# Patient Record
Sex: FEMALE | Race: WHITE | NOT HISPANIC OR LATINO | Employment: OTHER | URBAN - METROPOLITAN AREA
[De-identification: names, ages, dates, MRNs, and addresses within clinical notes are randomized per-mention and may not be internally consistent; named-entity substitution may affect disease eponyms.]

---

## 2017-01-17 ENCOUNTER — OFFICE VISIT (OUTPATIENT)
Dept: FAMILY MEDICINE | Facility: CLINIC | Age: 37
End: 2017-01-17
Payer: COMMERCIAL

## 2017-01-17 VITALS
TEMPERATURE: 99 F | WEIGHT: 193 LBS | BODY MASS INDEX: 31.02 KG/M2 | SYSTOLIC BLOOD PRESSURE: 138 MMHG | HEIGHT: 66 IN | HEART RATE: 80 BPM | DIASTOLIC BLOOD PRESSURE: 82 MMHG

## 2017-01-17 DIAGNOSIS — R31.9 HEMATURIA: Primary | ICD-10-CM

## 2017-01-17 DIAGNOSIS — Z11.3 SCREEN FOR STD (SEXUALLY TRANSMITTED DISEASE): ICD-10-CM

## 2017-01-17 DIAGNOSIS — Z97.5 IUD (INTRAUTERINE DEVICE) IN PLACE: ICD-10-CM

## 2017-01-17 DIAGNOSIS — L70.0 ACNE VULGARIS: ICD-10-CM

## 2017-01-17 DIAGNOSIS — R10.2 PELVIC CRAMPING: ICD-10-CM

## 2017-01-17 LAB
ALBUMIN UR-MCNC: NEGATIVE MG/DL
APPEARANCE UR: CLEAR
BACTERIA #/AREA URNS HPF: ABNORMAL /HPF
BILIRUB UR QL STRIP: NEGATIVE
COLOR UR AUTO: YELLOW
GLUCOSE UR STRIP-MCNC: NEGATIVE MG/DL
HGB UR QL STRIP: ABNORMAL
KETONES UR STRIP-MCNC: NEGATIVE MG/DL
LEUKOCYTE ESTERASE UR QL STRIP: NEGATIVE
MICRO REPORT STATUS: NORMAL
NITRATE UR QL: NEGATIVE
PH UR STRIP: 6.5 PH (ref 5–7)
RBC #/AREA URNS AUTO: ABNORMAL /HPF (ref 0–2)
SP GR UR STRIP: 1.02 (ref 1–1.03)
SPECIMEN SOURCE: NORMAL
URN SPEC COLLECT METH UR: ABNORMAL
UROBILINOGEN UR STRIP-ACNC: 0.2 EU/DL (ref 0.2–1)
WBC #/AREA URNS AUTO: ABNORMAL /HPF (ref 0–2)
WET PREP SPEC: NORMAL

## 2017-01-17 PROCEDURE — 87210 SMEAR WET MOUNT SALINE/INK: CPT | Performed by: FAMILY MEDICINE

## 2017-01-17 PROCEDURE — 99214 OFFICE O/P EST MOD 30 MIN: CPT | Performed by: FAMILY MEDICINE

## 2017-01-17 PROCEDURE — 81001 URINALYSIS AUTO W/SCOPE: CPT | Performed by: FAMILY MEDICINE

## 2017-01-17 PROCEDURE — 87591 N.GONORRHOEAE DNA AMP PROB: CPT | Performed by: FAMILY MEDICINE

## 2017-01-17 PROCEDURE — 87491 CHLMYD TRACH DNA AMP PROBE: CPT | Performed by: FAMILY MEDICINE

## 2017-01-17 NOTE — Clinical Note
[facility]  [address] [address] [phone]      January 23, 2017      Bernie Croft  [address]   [facility] [address]          Dear Ms. Croft    The results of your recent lab tests were within normal limits. Enclosed is a copy of these results.  If you have any further questions or problems, please contact our office.    Sincerely,      Madhav Samson DO/hl    Results for orders placed or performed in visit on 01/17/17   UA reflex to Microscopic and Culture   Result Value Ref Range    Color Urine Yellow     Appearance Urine Clear     Glucose Urine Negative NEG mg/dL    Bilirubin Urine Negative NEG    Ketones Urine Negative NEG mg/dL    Specific Gravity Urine 1.020 1.003 - 1.035    Blood Urine Trace (A) NEG    pH Urine 6.5 5.0 - 7.0 pH    Protein Albumin Urine Negative NEG mg/dL    Urobilinogen Urine 0.2 0.2 - 1.0 EU/dL    Nitrite Urine Negative NEG    Leukocyte Esterase Urine Negative NEG    Source Midstream Urine    Urine Microscopic   Result Value Ref Range    WBC Urine O - 2 0 - 2 /HPF    RBC Urine O - 2 0 - 2 /HPF    Bacteria Urine Few (A) NEG /HPF   Wet prep   Result Value Ref Range    Specimen Description Vagina     Wet Prep       No clue cells seen  No yeast seen  No Trichomonas seen      Micro Report Status FINAL 01/17/2017    Neisseria gonorrhoeae PCR   Result Value Ref Range    Specimen Descrip Cervix     N Gonorrhea PCR  NEG     Negative   Negative for N. gonorrhoeae rRNA by transcription mediated amplification.   A negative result by transcription mediated amplification does not preclude the   presence of N. gonorrhoeae infection because results are dependent on proper   and adequate collection, absence of inhibitors, and sufficient rRNA to be   detected.     Chlamydia trachomatis PCR   Result Value Ref Range    Specimen Description Cervix     Chlamydia Trachomatis PCR  NEG     Negative   Negative for C. trachomatis rRNA by  transcription mediated amplification.   A negative result by transcription mediated amplification does not preclude the   presence of C. trachomatis infection because results are dependent on proper   and adequate collection, absence of inhibitors, and sufficient rRNA to be   detected.

## 2017-01-17 NOTE — MR AVS SNAPSHOT
After Visit Summary   1/17/2017    Bernie Croft    MRN: [id number]           Patient Information     Date Of Birth          1980        Visit Information        Provider Department      1/17/2017 1:40 PM Madhav Samson DO [facility]        Today's Diagnoses     Hematuria    -  1     Pelvic cramping         Acne vulgaris         Screen for STD (sexually transmitted disease)         IUD (intrauterine device) in place            Follow-ups after your visit        Additional Services     DERMATOLOGY REFERRAL       Your provider has referred you to: [facility]: Dermatology [facility] [phone]   [email or web address]  UMP: [facility] [phone]   [email or web address]  N: [facility] [phone]   [email or web address]    Please be aware that coverage of these services is subject to the terms and limitations of your health insurance plan.  Call member services at your health plan with any benefit or coverage questions.      Please bring the following with you to your appointment:    (1) Any X-Rays, CTs or MRIs which have been performed.  Contact the facility where they were done to arrange for  prior to your scheduled appointment.    (2) List of current medications  (3) This referral request   (4) Any documents/labs given to you for this referral                  Who to contact     If you have questions or need follow up information about today's clinic visit or your schedule please contact [facility] directly at [phone].  Normal or non-critical lab and imaging results will be communicated to you by MyChart, letter or phone within 4 business days after the clinic has received the results. If you do not hear from us within 7 days, please contact the clinic through "[employer]t or  "phone. If you have a critical or abnormal lab result, we will notify you by phone as soon as possible.  Submit refill requests through [employer] or call your pharmacy and they will forward the refill request to us. Please allow 3 business days for your refill to be completed.          Additional Information About Your Visit        [employer] Information     [employer] gives you secure access to your electronic health record. If you see a primary care provider, you can also send messages to your care team and make appointments. If you have questions, please call your primary care clinic.  If you do not have a primary care provider, please call [phone] and they will assist you.        Care EveryWhere ID     This is your Care EveryWhere ID. This could be used by other organizations to access your [place] medical records  [id number]        Your Vitals Were     Pulse Temperature Height BMI (Body Mass Index)          80 99  F (37.2  C) (Oral) 5' 5.5\" (1.664 m) 31.62 kg/m2         Blood Pressure from Last 3 Encounters:   01/17/17 138/82   05/27/16 124/82   06/23/15 112/60    Weight from Last 3 Encounters:   01/17/17 193 lb (87.544 kg)   05/27/16 176 lb 8 oz (80.06 kg)   05/17/16 178 lb 9.6 oz (81.012 kg)              We Performed the Following     Chlamydia trachomatis PCR     DERMATOLOGY REFERRAL     Neisseria gonorrhoeae PCR     UA reflex to Microscopic and Culture     Urine Microscopic     Wet prep        Primary Care Provider Office Phone # Fax #    Madhav Bossshivambraeden Chapin,  [phone]       [address] [address]        Thank you!     Thank you for choosing [facility]  for your care. Our goal is always to provide you with excellent care. Hearing back from our patients is one way we can continue to improve our services. Please take a few minutes to complete the written survey that you may receive in the mail after your visit with us. " Thank you!             Your Updated Medication List - Protect others around you: Learn how to safely use, store and throw away your medicines at www.disposemymeds.org.          This list is accurate as of: 1/17/17  2:42 PM.  Always use your most recent med list.                   Brand Name Dispense Instructions for use    B-12 1000 MCG Caps      Once daily       minocycline 100 MG tablet    DYNACIN    90 tablet    Take 1 tablet (100 mg) by mouth daily Ok to switch to capsules if cheaper       MIRENA (52 MG) 20 MCG/24HR IUD   Generic drug:  levonorgestrel      IUD       multivitamin  peds with iron 60 MG chewable tablet      Take 1 chew tab by mouth daily.       vitamin D 2000 UNITS Caps      1 capsules daily

## 2017-01-17 NOTE — PROGRESS NOTES
SUBJECTIVE:                                                    Bernie Croft is a 36 year old female who presents to clinic today for the following health issues:    She is feeling off balance since her second round of medication [facility]. Not like herself.   She is still dealing with acne and feels her minocycline is not working. She knows that long term use of this can cause blood in urine, she is worries.       URINARY TRACT SYMPTOMS     Onset: treated with meds at [facility], stopped meds when they told her to, two days early. She was fine over [place] and about 2 weeks ago symptoms came back again. She does tend to hold her urine too long at times and can feel when she is getting bladder issues. She was treated a second time at [facility] and finished entire round of antibiotics, felt like symptoms mostly resolved but still has some lingering issues.     Description:   Painful urination (Dysuria): YES  Blood in urine (Hematuria): YES  Delay in urine (Hesitency):no    Intensity: moderate    Progression of Symptoms:  worsening    Accompanying Signs & Symptoms:  Fever/chills: no  Flank pain YES  Nausea and vomiting: no   Any vaginal symptoms: a bit of an odor  Abdominal/Pelvic Pain: YES, pressure   History:   History of frequent UTI's: YES  History of kidney stones: no   Sexually Active: yes  Possibility of pregnancy: No, IUD in place    Precipitating factors:   Holding urine too long         Therapies Tried and outcome: Increase fluid intake            Problem list and histories reviewed & adjusted, as indicated.  Additional history: as documented    Patient Active Problem List   Diagnosis     Other abnormal Papanicolaou smear of cervix and cervical HPV(795.09)     Acne     Hyperhidrosis of axilla     Obesity     CARDIOVASCULAR SCREENING; LDL GOAL LESS THAN 130     Melanocytic nevus     S/P Adjustable Band baraitric surgery      IUD (intrauterine device) in place     Abnormal mammogram     BMI  "28.0-28.9,adult     Postsurgical nonabsorption     Past Surgical History   Procedure Laterality Date     Mammoplasty reduction bilateral       Gyn surgery            Ent surgery       surgery for displaced nose     Laparoscopic gastric adjustable banding  2012     Procedure: LAPAROSCOPIC GASTRIC ADJUSTABLE BANDING;  LAPAROSCOPIC GASTRIC ADJUSTABLE BANDING ;  Surgeon: Sina Day MD;  Location:  OR       Social History   Substance Use Topics     Smoking status: Never Smoker      Smokeless tobacco: Never Used      Comment: no second hand smoke     Alcohol Use: Yes      Comment: rare about 0-1 drink per month     Family History   Problem Relation Age of Onset     DIABETES Maternal Grandmother      DIABETES Maternal Grandfather      C.A.D. No family hx of      Hypertension Maternal Grandmother      Hypertension Maternal Grandfather      Breast Cancer Maternal Grandmother      late 60's     Cancer - colorectal No family hx of      CEREBROVASCULAR DISEASE No family hx of      Thyroid Disease Father      Thyroid Disease Sister      Thyroid Disease Paternal Grandmother      Asthma No family hx of            ROS:  Constitutional, HEENT, cardiovascular, pulmonary, GI, , musculoskeletal, neuro, skin, endocrine and psych systems are negative, except as otherwise noted.    OBJECTIVE:                                                    /82 mmHg  Pulse 80  Temp(Src) 99  F (37.2  C) (Oral)  Ht 5' 5.5\" (1.664 m)  Wt 193 lb (87.544 kg)  BMI 31.62 kg/m2  Body mass index is 31.62 kg/(m^2).  GENERAL: healthy, alert and no distress  EYES: Eyes grossly normal to inspection, PERRL and conjunctivae and sclerae normal  HENT: ear canals and TM's normal, nose and mouth without ulcers or lesions  NECK: no adenopathy, no asymmetry, masses, or scars and thyroid normal to palpation  RESP: lungs clear to auscultation - no rales, rhonchi or wheezes  BREAST: normal without masses, tenderness or nipple discharge and no " palpable axillary masses or adenopathy  CV: regular rate and rhythm, normal S1 S2, no S3 or S4, no murmur, click or rub, no peripheral edema and peripheral pulses strong  ABDOMEN: soft, nontender, no hepatosplenomegaly, no masses and bowel sounds normal   (female): normal female external genitalia, normal urethral meatus, vaginal mucosa pink, moist, well rugated, and normal cervix/adnexa/uterus without masses or discharge, iud string is in place  MS: no gross musculoskeletal defects noted, no edema  SKIN: no suspicious lesions or rashes  NEURO: Normal strength and tone, mentation intact and speech normal  PSYCH: mentation appears normal, affect normal/bright    Diagnostic Test Results:  Results for orders placed or performed in visit on 01/17/17 (from the past 24 hour(s))   UA reflex to Microscopic and Culture   Result Value Ref Range    Color Urine Yellow     Appearance Urine Clear     Glucose Urine Negative NEG mg/dL    Bilirubin Urine Negative NEG    Ketones Urine Negative NEG mg/dL    Specific Gravity Urine 1.020 1.003 - 1.035    Blood Urine Trace (A) NEG    pH Urine 6.5 5.0 - 7.0 pH    Protein Albumin Urine Negative NEG mg/dL    Urobilinogen Urine 0.2 0.2 - 1.0 EU/dL    Nitrite Urine Negative NEG    Leukocyte Esterase Urine Negative NEG    Source Midstream Urine    Urine Microscopic   Result Value Ref Range    WBC Urine O - 2 0 - 2 /HPF    RBC Urine O - 2 0 - 2 /HPF    Bacteria Urine Few (A) NEG /HPF   Wet prep   Result Value Ref Range    Specimen Description Vagina     Wet Prep       No clue cells seen  No yeast seen  No Trichomonas seen      Micro Report Status FINAL 01/17/2017         ASSESSMENT/PLAN:                                                        ICD-10-CM    1. Hematuria R31.9 UA reflex to Microscopic and Culture     Urine Microscopic   2. Pelvic cramping R10.2 Wet prep     Neisseria gonorrhoeae PCR     Chlamydia trachomatis PCR     CANCELED: Neisseria gonorrhoeae PCR     CANCELED: Chlamydia  trachomatis PCR   3. Acne vulgaris L70.0 DERMATOLOGY REFERRAL   4. Screen for STD (sexually transmitted disease) Z11.3 Chlamydia trachomatis PCR   5. IUD (intrauterine device) in place Z97.5      History of hematuria with recent UTI-today negative, discussed about bladder hygeine  Pelvic cramping/vaginal discharge-wet prep negative  iud-in place  Acne-not improving, referral placed          See Patient Instructions    Madhav Samson DO  [facility]

## 2017-01-18 ENCOUNTER — MYC MEDICAL ADVICE (OUTPATIENT)
Dept: FAMILY MEDICINE | Facility: CLINIC | Age: 37
End: 2017-01-18

## 2017-01-18 LAB
C TRACH DNA SPEC QL NAA+PROBE: NORMAL
N GONORRHOEA DNA SPEC QL NAA+PROBE: NORMAL
SPECIMEN SOURCE: NORMAL
SPECIMEN SOURCE: NORMAL

## 2017-02-12 ENCOUNTER — TELEPHONE (OUTPATIENT)
Dept: NURSING | Facility: CLINIC | Age: 37
End: 2017-02-12

## 2017-02-12 NOTE — TELEPHONE ENCOUNTER
Call Type: Triage Call    Presenting Problem: Patient calling with questions about influenza  exposure, flu shots, etc.  Triage Note:  Guideline Title: Information Only Call; No Symptom Triage (Adult)  Recommended Disposition: Call Provider When Office is Open  Original Inclination: Wanted to speak with a nurse  Override Disposition:  Intended Action: See /Aries Appt  Physician Contacted: No  Requesting information and provider is best resource; no triage required. ?  YES  Requesting regular office appointment ? NO  Sign(s) or symptom(s) associated with a diagnosed condition or with a new illness  ? NO  Requesting information about provider, services or community resources ? NO  Call back to complete assessment/clarification of information from prior caller to  complete triage ? NO  Physician Instructions:  Care Advice:

## 2017-04-06 ENCOUNTER — TRANSFERRED RECORDS (OUTPATIENT)
Dept: HEALTH INFORMATION MANAGEMENT | Facility: CLINIC | Age: 37
End: 2017-04-06

## 2017-04-18 ENCOUNTER — OFFICE VISIT (OUTPATIENT)
Dept: FAMILY MEDICINE | Facility: CLINIC | Age: 37
End: 2017-04-18
Payer: COMMERCIAL

## 2017-04-18 VITALS
SYSTOLIC BLOOD PRESSURE: 142 MMHG | BODY MASS INDEX: 30.97 KG/M2 | TEMPERATURE: 98 F | WEIGHT: 189 LBS | DIASTOLIC BLOOD PRESSURE: 80 MMHG | HEART RATE: 74 BPM

## 2017-04-18 DIAGNOSIS — R03.0 ELEVATED BLOOD PRESSURE READING WITHOUT DIAGNOSIS OF HYPERTENSION: Primary | ICD-10-CM

## 2017-04-18 DIAGNOSIS — Z13.220 LIPID SCREENING: ICD-10-CM

## 2017-04-18 DIAGNOSIS — L70.9 ACNE, UNSPECIFIED ACNE TYPE: ICD-10-CM

## 2017-04-18 DIAGNOSIS — S46.312A TRICEPS STRAIN, LEFT, INITIAL ENCOUNTER: ICD-10-CM

## 2017-04-18 PROCEDURE — 36415 COLL VENOUS BLD VENIPUNCTURE: CPT | Performed by: PHYSICIAN ASSISTANT

## 2017-04-18 PROCEDURE — 99214 OFFICE O/P EST MOD 30 MIN: CPT | Performed by: PHYSICIAN ASSISTANT

## 2017-04-18 PROCEDURE — 80061 LIPID PANEL: CPT | Performed by: PHYSICIAN ASSISTANT

## 2017-04-18 PROCEDURE — 80048 BASIC METABOLIC PNL TOTAL CA: CPT | Performed by: PHYSICIAN ASSISTANT

## 2017-04-18 RX ORDER — SPIRONOLACTONE 25 MG/1
125 TABLET ORAL DAILY
COMMUNITY
Start: 2017-04-18 | End: 2018-07-06

## 2017-04-18 NOTE — PATIENT INSTRUCTIONS
[facility]   Discharged by : Viry CONNER Certified Medical Assistant (AAMA)April 18, 2017 10:31 AM    Paper scripts provided to patient : none   If you have any questions regarding to your visit please contact your care team:   [facility] Clinic Hours Telephone Number   Dr. Viry Alford, HELEN   7am-7pm Monday - Thursday   7am-5pm Fridays  [phone]   (Appointment scheduling available 24/7)   RN Line   [phone] option 2     What options do I have for visits at the clinic other than the traditional office visit?   To expand how we care for you, many of our providers are utilizing electronic visits (e-visits) and telephone visits, when medically appropriate, for interactions with their patients rather than a visit in the clinic. We also offer nurse visits for many medical concerns. Just like any other service, we will bill your insurance company for this type of visit based on time spent on the phone with your provider. Not all insurance companies cover these visits. Please check with your medical insurance if this type of visit is covered. You will be responsible for any charges that are not paid by your insurance.   E-visits via [employer]: generally incur a $35.00 fee.   Telephone visits:   Time spent on the phone: *charged based on time that is spent on the phone in increments of 10 minutes. Estimated cost:   5-10 mins $30.00   11-20 mins. $59.00   21-30 mins. $85.00   Use [employer] (secure email communication and access to your chart) to send your primary care provider a message or make an appointment. Ask someone on your Team how to sign up for [employer].   For a Price Quote for your services, please call our Consumer Price Line at [phone].   As always, Thank you for trusting us with your health care needs!                    [facility]:    Scheduling Appointments  [facility]  Call:  [phone]    [facility]  Call: [phone]    [facility]  Call: [phone]    WHERE TO GO FOR CARE?    Clinic    Make an appointment if you:       Are sick (cold, cough, flu, sore throat, earache or in pain).       Have a small injury (sprain, small cut, burn or broken bone).       Need a physical exam, Pap smear, vaccine or prescription refill.       Have questions about your health or medicines.    To reach us:      Call [phone] ([phone]). Open 24 hours every day. (For counseling services, call [phone].)    Log into ALTHIA at [employer]. (Visit [employer] to create an account.) Hospital emergency room    An emergency is a serious or life- threatening problem that must be treated right away.    Call 485 or get to the hospital if you have:      Very bad or sudden:            - Chest pain or pressure         - Bleeding         - Head or belly pain         - Dizziness or trouble seeing, walking or                          Speaking      Problems breathing      Blood in your vomit or you are coughing up blood      A major injury (knocked out, loss of a finger or limb, rape, broken bone protruding from skin)    A mental health crisis. (Or call the Mental Health Crisis line at 1-628.472.3142 or Suicide Prevention Hotline at 1-914.226.4864.)    Open 24 hours every day. You don't need an appointment.     Urgent care    Visit urgent care for sickness or small injuries when the clinic is closed. You don't need an appointment. To check hours or find an urgent care near you, visit [email or web address]. Online care    Get online care from [employer] for more than 70 common problems, like colds, allergies and infections. Open 24 hours every day at: [email or web address]   Need help deciding?    For advice about where to be seen, you may call your clinic and ask to speak with a nurse. We're here for you 24 hours every day.          If you are deaf or hard of hearing, please let us know. We provide many free services including sign language interpreters, oral interpreters, TTYs, telephone amplifiers, note takers and written materials.

## 2017-04-18 NOTE — PROGRESS NOTES
"  SUBJECTIVE:                                                    Bernie Croft is a 36 year old female who presents to clinic today for the following health issues:      Patient reports that she was seen at [facility] recently for an ear infection. She was told her BP was very high.   She reports  That her BP has \"slowly been creeping\".  Patient has taken lauryn BP medications in the past around 2009. Stopped them in about 2010.   She also reports that she has a very stressful job and drinks quite a bit of coffee  Denies chest pain, shortness of breath, headaches, dizziness.    A couple weeks ago at the gym she injured her left arm doing push ups with legs extended. She reports she has massaged it a lot and it has gotten a bit of a bruise. She describes the pain a shooting sharp pain when putting weight on it. She goes to a boxing gym, throwing a punch is difficult.      Started spironolactone for acne 1 week ago with dermatology.    -------------------------------------    Problem list, Medication list, Allergies, and Medical/Social/Surgical histories reviewed in [facility] and updated as appropriate.    ROS:  A pertinent ROS of the General  HEENT  Cardiovascular  Pulmonary  GI  Musculoskeletal   Neurological  Integumentary systems is otherwise unremarkable.      OBJECTIVE:                                                    /80 (BP Location: Right arm, Patient Position: Chair, Cuff Size: Adult Large)  Pulse 74  Temp 98  F (36.7  C) (Oral)  Ht (P) 5' 5.5\" (1.664 m)  Wt 189 lb (85.7 kg)  BMI (P) 30.97 kg/m2 .  GENERAL:: healthy, alert and no distress  RESP: lungs clear to auscultation - no rales, no rhonchi, no wheezes  CV: regular rates and rhythm, normal S1 S2, no S3 or S4 and no murmur, no click or rub -  Upper arm left: Tender at triceps muscle. Pain with elbow extension. Tightness with shoulder internal rotation and external rotation. Range of motion is otherwise normal of shoulder and elbow and strength " intact.    Diagnostic test results:  Diagnostic Test Results:  No results found for this or any previous visit (from the past 24 hour(s)).     ASSESSMENT/PLAN:                                                        1. Elevated blood pressure reading without diagnosis of hypertension  Discussed dietary changes, improving water intake and increasing cardio.  She has home cuff, will start checking at home. If > 140/90 on a consistent basis, then would recommend treatment. Discussed long term risks of HTN. She understands.  - BASIC METABOLIC PANEL    2. Triceps strain, left, initial encounter  Reviewed findings, she will discuss a rehab program with a  at her gym.    3. Acne, unspecified acne type  Check bmp related to spironolactone.   BMP    4. Lipid screening  - Lipid panel reflex to direct LDL    Follow up with Provider - 1-2 months for recheck with blood pressure.          Jennifer Alford PA-C  [facility]

## 2017-04-18 NOTE — LETTER
[facility]  [address] [address] [phone]      April 26, 2017      Bernie Croft  [address]   [place] [address]              Dear Bernie,    Your labs show a mild elevation in your kidney function. This can occur with fasting. You have not had this in years past, so I would like for it to be repeated in 1 month to make sure it is okay. Please come really hydrated to that visit.    Results for orders placed or performed in visit on 04/18/17   BASIC METABOLIC PANEL   Result Value Ref Range    Sodium 140 133 - 144 mmol/L    Potassium 4.6 3.4 - 5.3 mmol/L    Chloride 106 94 - 109 mmol/L    Carbon Dioxide 26 20 - 32 mmol/L    Anion Gap 8 3 - 14 mmol/L    Glucose 88 70 - 99 mg/dL    Urea Nitrogen 15 7 - 30 mg/dL    Creatinine 1.09 (H) 0.52 - 1.04 mg/dL    GFR Estimate 57 (L) >60 mL/min/1.7m2    GFR Estimate If Black 69 >60 mL/min/1.7m2    Calcium 9.2 8.5 - 10.1 mg/dL   Lipid panel reflex to direct LDL   Result Value Ref Range    Cholesterol 175 <200 mg/dL    Triglycerides 92 <150 mg/dL    HDL Cholesterol 56 >49 mg/dL    LDL Cholesterol Calculated 101 (H) <100 mg/dL    Non HDL Cholesterol 119 <130 mg/dL         Sincerely,    Madhav Samson, DO/roshan

## 2017-04-18 NOTE — MR AVS SNAPSHOT
After Visit Summary   4/18/2017    Bernei Croft    MRN: [id number]           Patient Information     Date Of Birth          1980        Visit Information        Provider Department      4/18/2017 9:40 AM Jennifer Alford PA-C [facility]        Today's Diagnoses     Lipid screening    -  1    Screening for malignant neoplasm of cervix        Elevated blood pressure reading without diagnosis of hypertension        Acne, unspecified acne type          Care Instructions    [facility]   Discharged by : Viyr CONNER, Certified Medical Assistant (AAMA)April 18, 2017 10:31 AM    Paper scripts provided to patient : none   If you have any questions regarding to your visit please contact your care team:   [facility] Clinic Hours Telephone Number   Dr. Viry Alford PA-C   7am-7pm Monday - Thursday   7am-5pm Fridays  [phone]   (Appointment scheduling available 24/7)   RN Line   [phone] option 2     What options do I have for visits at the clinic other than the traditional office visit?   To expand how we care for you, many of our providers are utilizing electronic visits (e-visits) and telephone visits, when medically appropriate, for interactions with their patients rather than a visit in the clinic. We also offer nurse visits for many medical concerns. Just like any other service, we will bill your insurance company for this type of visit based on time spent on the phone with your provider. Not all insurance companies cover these visits. Please check with your medical insurance if this type of visit is covered. You will be responsible for any charges that are not paid by your insurance.   E-visits via HouseTab: generally incur a $35.00 fee.   Telephone visits:   Time spent on the phone: *charged based on time that is spent on the phone in increments of 10 minutes. Estimated cost:   5-10 mins $30.00   11-20  mins. $59.00   21-30 mins. $85.00   Use shoutr (secure email communication and access to your chart) to send your primary care provider a message or make an appointment. Ask someone on your Team how to sign up for shoutr.   For a Price Quote for your services, please call our Consumer Price Line at [phone].   As always, Thank you for trusting us with your health care needs!                    [facility]:    Scheduling Appointments  [place]  Call: [phone]    Zana Klein, [facility]  Call: [phone]    [facility]  Call: [phone]    WHERE TO GO FOR CARE?    Clinic    Make an appointment if you:       Are sick (cold, cough, flu, sore throat, earache or in pain).       Have a small injury (sprain, small cut, burn or broken bone).       Need a physical exam, Pap smear, vaccine or prescription refill.       Have questions about your health or medicines.    To reach us:      Call [phone] ([phone]). Open 24 hours every day. (For counseling services, call [phone].)    Log into shoutr at [email or web address]. (Visit [email or web address] to create an account.) Hospital emergency room    An emergency is a serious or life- threatening problem that must be treated right away.    Call 361 or get to the hospital if you have:      Very bad or sudden:            - Chest pain or pressure         - Bleeding         - Head or belly pain         - Dizziness or trouble seeing, walking or                          Speaking      Problems breathing      Blood in your vomit or you are coughing up blood      A major injury (knocked out, loss of a finger or limb, rape, broken bone protruding from skin)    A mental health crisis. (Or call the Mental Health Crisis line at 1-358.994.5454 or Suicide Prevention Hotline at 1-422.781.5011.)    Open 24 hours every day. You don't need an appointment.     Urgent care    Visit urgent  care for sickness or small injuries when the clinic is closed. You don't need an appointment. To check hours or find an urgent care near you, visit [email or web address]. Online care    Get online care from [facility] for more than 70 common problems, like colds, allergies and infections. Open 24 hours every day at: [email or web address]   Need help deciding?    For advice about where to be seen, you may call your clinic and ask to speak with a nurse. We're here for you 24 hours every day.         If you are deaf or hard of hearing, please let us know. We provide many free services including sign language interpreters, oral interpreters, TTYs, telephone amplifiers, note takers and written materials.               Follow-ups after your visit        Who to contact     If you have questions or need follow up information about today's clinic visit or your schedule please contact [facility] directly at [phone].  Normal or non-critical lab and imaging results will be communicated to you by AV Homeshart, letter or phone within 4 business days after the clinic has received the results. If you do not hear from us within 7 days, please contact the clinic through Space Monkeyt or phone. If you have a critical or abnormal lab result, we will notify you by phone as soon as possible.  Submit refill requests through HyperWeek or call your pharmacy and they will forward the refill request to us. Please allow 3 business days for your refill to be completed.          Additional Information About Your Visit        AV [employer]Sor[employer] Information     HyperWeek gives you secure access to your electronic health record. If you see a primary care provider, you can also send messages to your care team and make appointments. If you have questions, please call your primary care clinic.  If you do not have a primary care provider, please call [phone] and they will assist you.        Care EveryWhere ID     This is your Care EveryWhere  ID. This could be used by other organizations to access your [facility] medical records  [id number]        Your Vitals Were     Pulse Temperature BMI (Body Mass Index)             74 98  F (36.7  C) (Oral) 30.97 kg/m2          Blood Pressure from Last 3 Encounters:   04/18/17 142/80   01/17/17 138/82   05/27/16 124/82    Weight from Last 3 Encounters:   04/18/17 189 lb (85.7 kg)   01/17/17 193 lb (87.5 kg)   05/27/16 176 lb 8 oz (80.1 kg)              We Performed the Following     BASIC METABOLIC PANEL     Lipid panel reflex to direct LDL        Primary Care Provider Office Phone # Fax #    Madhav Samson [phone]       [address] [address]        Thank you!     Thank you for choosing [facility]  for your care. Our goal is always to provide you with excellent care. Hearing back from our patients is one way we can continue to improve our services. Please take a few minutes to complete the written survey that you may receive in the mail after your visit with us. Thank you!             Your Updated Medication List - Protect others around you: Learn how to safely use, store and throw away your medicines at www.disposemymeds.org.          This list is accurate as of: 4/18/17 10:32 AM.  Always use your most recent med list.                   Brand Name Dispense Instructions for use    B-12 1000 MCG Caps      Once daily       minocycline 100 MG tablet    DYNACIN    90 tablet    Take 1 tablet (100 mg) by mouth daily Ok to switch to capsules if cheaper       MIRENA (52 MG) 20 MCG/24HR IUD   Generic drug:  levonorgestrel      IUD       multivitamin  peds with iron 60 MG chewable tablet      Take 1 chew tab by mouth daily.       vitamin D 2000 UNITS Caps      1 capsules daily

## 2017-04-19 LAB
ANION GAP SERPL CALCULATED.3IONS-SCNC: 8 MMOL/L (ref 3–14)
BUN SERPL-MCNC: 15 MG/DL (ref 7–30)
CALCIUM SERPL-MCNC: 9.2 MG/DL (ref 8.5–10.1)
CHLORIDE SERPL-SCNC: 106 MMOL/L (ref 94–109)
CHOLEST SERPL-MCNC: 175 MG/DL
CO2 SERPL-SCNC: 26 MMOL/L (ref 20–32)
CREAT SERPL-MCNC: 1.09 MG/DL (ref 0.52–1.04)
GFR SERPL CREATININE-BSD FRML MDRD: 57 ML/MIN/1.7M2
GLUCOSE SERPL-MCNC: 88 MG/DL (ref 70–99)
HDLC SERPL-MCNC: 56 MG/DL
LDLC SERPL CALC-MCNC: 101 MG/DL
NONHDLC SERPL-MCNC: 119 MG/DL
POTASSIUM SERPL-SCNC: 4.6 MMOL/L (ref 3.4–5.3)
SODIUM SERPL-SCNC: 140 MMOL/L (ref 133–144)
TRIGL SERPL-MCNC: 92 MG/DL

## 2017-05-04 ENCOUNTER — TELEPHONE (OUTPATIENT)
Dept: NURSING | Facility: CLINIC | Age: 37
End: 2017-05-04

## 2017-05-05 ENCOUNTER — OFFICE VISIT (OUTPATIENT)
Dept: FAMILY MEDICINE | Facility: CLINIC | Age: 37
End: 2017-05-05
Payer: COMMERCIAL

## 2017-05-05 VITALS
SYSTOLIC BLOOD PRESSURE: 142 MMHG | HEIGHT: 66 IN | DIASTOLIC BLOOD PRESSURE: 84 MMHG | TEMPERATURE: 98.6 F | WEIGHT: 192 LBS | BODY MASS INDEX: 30.86 KG/M2 | HEART RATE: 72 BPM

## 2017-05-05 DIAGNOSIS — Z12.4 SCREENING FOR MALIGNANT NEOPLASM OF CERVIX: ICD-10-CM

## 2017-05-05 DIAGNOSIS — I10 HYPERTENSION, GOAL BELOW 140/90: Primary | ICD-10-CM

## 2017-05-05 DIAGNOSIS — N93.0 PCB (POST COITAL BLEEDING): ICD-10-CM

## 2017-05-05 PROCEDURE — 99214 OFFICE O/P EST MOD 30 MIN: CPT | Performed by: PHYSICIAN ASSISTANT

## 2017-05-05 PROCEDURE — 36415 COLL VENOUS BLD VENIPUNCTURE: CPT | Performed by: PHYSICIAN ASSISTANT

## 2017-05-05 PROCEDURE — G0124 SCREEN C/V THIN LAYER BY MD: HCPCS | Performed by: PHYSICIAN ASSISTANT

## 2017-05-05 PROCEDURE — 80069 RENAL FUNCTION PANEL: CPT | Performed by: PHYSICIAN ASSISTANT

## 2017-05-05 PROCEDURE — G0145 SCR C/V CYTO,THINLAYER,RESCR: HCPCS | Performed by: PHYSICIAN ASSISTANT

## 2017-05-05 PROCEDURE — 87624 HPV HI-RISK TYP POOLED RSLT: CPT | Performed by: PHYSICIAN ASSISTANT

## 2017-05-05 NOTE — TELEPHONE ENCOUNTER
"Call Type: Triage Call    Presenting Problem: \"I have a Swati in place. After intercourse last  night I had some heavier bleeding with clots. Today, I still have  light bleeding and mild abdominal pain.\"  Triage Note:  Guideline Title: Contraception: Intrauterine Device (IUD)  Recommended Disposition: See Provider within 72 Hours  Original Inclination: Did not know what to do  Override Disposition:  Intended Action: Call PCP/HCP  Physician Contacted: No  Mild vaginal bleeding or spotting ?  YES  Moderate vaginal bleeding ? NO  Sexually active AND ectopic pregnancy risk factors ? NO  Able to feel hard plastic of IUD (not the string) ? NO  New or worsening signs and symptoms that may indicate shock ? NO  Moderate abdominal or pelvic pain ? NO  Unbearable abdominal/pelvic pain or cramping ? NO  Profuse vaginal bleeding not contained by pads or tampons ? NO  Heavy vaginal bleeding (soaking 1 pad/tampon every hour for 2 hours or more) ? NO  Pelvic pain AND foul smelling or unusual vaginal discharge ? NO  Foul smelling or unusual vaginal discharge with no other symptoms ? NO  Pregnant AND IUD related symptoms ? NO  IUD inserted within last 48 hours AND having persistent pain when following  provider pain relief instructions ? NO  IUD inserted within last 48 hours AND pain relieved by nonprescription  anti-inflammatory medications, but returns ? NO  Generalized or localized pain that becomes severe with movement, standing up  straight or coughing ? NO  Physician Instructions:  Care Advice: Bring any tissue that has passed for examination by provider.  Refrain from douching, using feminine hygiene sprays, scented deodorant  tampons, or nonprescription intravaginal medication until evaluated by a  provider.  IUD Symptoms:  Any of the following require an evaluation by provider.   P  - Period late (possible pregnancy), abnormal spotting or bleeding.   A -  Abnormal pain, pain with intercourse, pelvic pain or severe " menstrual  cramping.   I - Infection exposure, abnormal vaginal discharge, partner  diagnosed with sexually transmitted disease (STD).   N - Not feeling well  chills, fever.   S - String, missing or becoming shorter/longer.

## 2017-05-05 NOTE — PROGRESS NOTES
"  SUBJECTIVE:                                                    Bernie Croft is a 36 year old female who presents to clinic today for the following health issues:      Recheck of mirena. Placed - 1/2014. Patient has been noticing Wednesday night after sex partner noticed poking. Significant amount of blood afterwards. This AM there is dark spotting. Noticing a little cramping. Has not had much bleeding at all since beginning of Mirena.     Patient has been tracking blood pressures. 150/83. Other than that high 140s/80's.  Patient has history of HTn and has been on meds in the past, but was able to get off them. Recently blood pressure's have not been well controlled.     Reviewed her labs, Creatinine is a little high.   -------------------------------------    Problem list, Medication list, Allergies, and Medical/Social/Surgical histories reviewed in EPIC and updated as appropriate.    ROS:  A pertinent ROS of the General  Cardiovascular  Pulmonary  Musculoskeletal    systems is otherwise unremarkable.      OBJECTIVE:                                                    /84 (BP Location: Right arm, Patient Position: Chair, Cuff Size: Adult Large)  Pulse 72  Temp 98.6  F (37  C) (Oral)  Ht 5' 5.5\" (1.664 m)  Wt 192 lb (87.1 kg)  BMI 31.46 kg/m2 .  GENERAL: healthy, alert and no distress  - female: normal cervix, adnexae, and uterus without masses or discharge. IUD strings intact.   Bimanual exam is normal without masses or tenderness.      Diagnostic test results:  Diagnostic Test Results:  none      ASSESSMENT/PLAN:                                                        1. Hypertension, goal below 140/90  Her blood pressure is still elevated.  Patient agrees to enroll in PGEN Study, however, disqualified due to sulfa allergy.  Because renal panel was elevated at last visit will recheck prior to starting new med.  - Renal panel (Alb, BUN, Ca, Cl, CO2, Creat, Gluc, Phos, K, Na); Future    2. PCB (post " coital bleeding)  Discussed with patient this does not mean the IUD is not working. It is possible she was going to get a period anyway. IUD intact. Recommend watchful waiting. If occurs again then would recommend pelvic US.     3. Screening for malignant neoplasm of cervix  - Pap imaged thin layer screen with HPV - recommended age 30 - 65 years (select HPV order below)  - HPV High Risk Types DNA Cervical    Follow up with Provider - 2-3 weeks for blood pressure recheck.       Jennifer Alford PA-C  [facility]

## 2017-05-05 NOTE — PATIENT INSTRUCTIONS
[facility]   Discharged by : Cristy MOREL MA    If you have any questions regarding your visit please contact your care team:     [facility] Clinic Hours Telephone Number   [address] Dr., Dr.   7am-7pm Monday - Thursday   7am-5pm Fridays  [phone]   (Appointment scheduling available 24/7)   RN Line   [phone] option 2       For a Price Quote for your services, please call our Consumer Price Line at [phone].     What options do I have for visits at the clinic other than the traditional office visit?     To expand how we care for you, many of our providers are utilizing electronic visits (e-visits) and telephone visits, when medically appropriate, for interactions with their patients rather than a visit in the clinic. We also offer nurse visits for many medical concerns. Just like any other service, we will bill your insurance company for this type of visit based on time spent on the phone with your provider. Not all insurance companies cover these visits. Please check with your medical insurance if this type of visit is covered. You will be responsible for any charges that are not paid by your insurance.   E-visits via Momo Networks: generally incur a $35.00 fee.     Telephone visits:   Time spent on the phone: *charged based on time that is spent on the phone in increments of 10 minutes. Estimated cost:   5-10 mins $30.00   11-20 mins. $59.00   21-30 mins. $85.00     Use Geothermal Internationalhart (secure email communication and access to your chart) to send your primary care provider a message or make an appointment. Ask someone on your Team how to sign up for Momo Networks.     As always, Thank you for trusting us with your health care needs!      [facility]:    Scheduling Appointments  [facility]  Call: [phone]    [facility]  Call: [phone]    [facility]  [facility]  Call: [phone]      WHERE TO GO FOR CARE?    Clinic    Make an appointment if you:       Are sick (cold, cough, flu, sore throat, earache or in pain).       Have a small injury (sprain, small cut, burn or broken bone).       Need a physical exam, Pap smear, vaccine or prescription refill.       Have questions about your health or medicines.    To reach us:      Call [phone] ([phone]). Open 24 hours every day. (For counseling services, call [phone].)    Log into Evolve IP at [facility]. (Visit Rivertop Renewables to create an account.) Hospital emergency room    An emergency is a serious or life- threatening problem that must be treated right away.    Call 604 or get to the hospital if you have:      Very bad or sudden:            - Chest pain or pressure         - Bleeding         - Head or belly pain         - Dizziness or trouble seeing, walking or                          Speaking      Problems breathing      Blood in your vomit or you are coughing up blood      A major injury (knocked out, loss of a finger or limb, rape, broken bone protruding from skin)    A mental health crisis. (Or call the Mental Health Crisis line at 1-888.252.6598 or Suicide Prevention Hotline at 1-611.195.7653.)    Open 24 hours every day. You don't need an appointment.     Urgent care    Visit urgent care for sickness or small injuries when the clinic is closed. You don't need an appointment. To check hours or find an urgent care near you, visit [email or web address]. Online care    Get online care from [email or web address] for more than 70 common problems, like colds, allergies and infections. Open 24 hours every day at: [email or web address]   Need help deciding?    For advice about where to be seen, you may call your clinic and ask to speak with a nurse. We're here for you 24 hours every day.         If you are deaf or hard of hearing, please let us know. We provide many free  services including sign language interpreters, oral interpreters, TTYs, telephone amplifiers, note takers and written materials.

## 2017-05-05 NOTE — MR AVS SNAPSHOT
After Visit Summary   5/5/2017    Bernie Croft    MRN: [id number]           Patient Information     Date Of Birth          1980        Visit Information        Provider Department      5/5/2017 9:20 AM Jennifer Alford PA-C [facility]        Today's Diagnoses     Hypertension, goal below 140/90    -  1    Screening for malignant neoplasm of cervix        PCB (post coital bleeding)          Care Instructions    [facility]   Discharged by : Cristy MOREL MA    If you have any questions regarding your visit please contact your care team:     [facility] Hours Telephone Number   [address] Dr., Dr.   7am-7pm Monday - Thursday   7am-5pm Fridays  [phone]   (Appointment scheduling available 24/7)   RN Line   [phone] option 2       For a Price Quote for your services, please call our [facility] Price Line at [phone].     What options do I have for visits at the clinic other than the traditional office visit?     To expand how we care for you, many of our providers are utilizing electronic visits (e-visits) and telephone visits, when medically appropriate, for interactions with their patients rather than a visit in the clinic. We also offer nurse visits for many medical concerns. Just like any other service, we will bill your insurance company for this type of visit based on time spent on the phone with your provider. Not all insurance companies cover these visits. Please check with your medical insurance if this type of visit is covered. You will be responsible for any charges that are not paid by your insurance.   E-visits via [employer]: generally incur a $35.00 fee.     Telephone visits:   Time spent on the phone: *charged based on time that is spent on the phone in increments of 10 minutes. Estimated cost:   5-10 mins $30.00   11-20 mins. $59.00   21-30 mins. $85.00      Use [employer] (secure email communication and access to your chart) to send your primary care provider a message or make an appointment. Ask someone on your Team how to sign up for [employer].     As always, Thank you for trusting us with your health care needs!      [facility]:    Scheduling Appointments  [facility]  Call: [phone]    [facility]  Call: [phone]    [facility]  Call: [phone]      WHERE TO GO FOR CARE?    Clinic    Make an appointment if you:       Are sick (cold, cough, flu, sore throat, earache or in pain).       Have a small injury (sprain, small cut, burn or broken bone).       Need a physical exam, Pap smear, vaccine or prescription refill.       Have questions about your health or medicines.    To reach us:      Call [phone] ([phone]). Open 24 hours every day. (For counseling services, call [phone].)    Log into [employer] at [email or web address]. (Visit [email or web address] to create an account.) Hospital emergency room    An emergency is a serious or life- threatening problem that must be treated right away.    Call 048 or get to the hospital if you have:      Very bad or sudden:            - Chest pain or pressure         - Bleeding         - Head or belly pain         - Dizziness or trouble seeing, walking or                          Speaking      Problems breathing      Blood in your vomit or you are coughing up blood      A major injury (knocked out, loss of a finger or limb, rape, broken bone protruding from skin)    A mental health crisis. (Or call the Mental Health Crisis line at 1-722.349.8137 or Suicide Prevention Hotline at 1-104.690.9348.)    Open 24 hours every day. You don't need an appointment.     Urgent care    Visit urgent care for sickness or small injuries when the clinic is closed. You don't need an appointment. To check hours or find an urgent care  near you, visit [email or web address]. Online care    Get online care from [facility] for more than 70 common problems, like colds, allergies and infections. Open 24 hours every day at: [email or web address]   Need help deciding?    For advice about where to be seen, you may call your clinic and ask to speak with a nurse. We're here for you 24 hours every day.         If you are deaf or hard of hearing, please let us know. We provide many free services including sign language interpreters, oral interpreters, TTYs, telephone amplifiers, note takers and written materials.                       Follow-ups after your visit        Future tests that were ordered for you today     Open Future Orders        Priority Expected Expires Ordered    Renal panel (Alb, BUN, Ca, Cl, CO2, Creat, Gluc, Phos, K, Na) Routine  6/5/2017 5/5/2017            Who to contact     If you have questions or need follow up information about today's clinic visit or your schedule please contact [facility] directly at [phone].  Normal or non-critical lab and imaging results will be communicated to you by Hippflowhart, letter or phone within 4 business days after the clinic has received the results. If you do not hear from us within 7 days, please contact the clinic through [employer] or phone. If you have a critical or abnormal lab result, we will notify you by phone as soon as possible.  Submit refill requests through [employer] or call your pharmacy and they will forward the refill request to us. Please allow 3 business days for your refill to be completed.          Additional Information About Your Visit        [employer] Information     [employer] gives you secure access to your electronic health record. If you see a primary care provider, you can also send messages to your care team and make appointments. If you have questions, please call your primary care clinic.  If you do not have a primary care provider, please call  "[phone] and they will assist you.        Care EveryWhere ID     This is your Care EveryWhere ID. This could be used by other organizations to access your [place] medical records  [id number]        Your Vitals Were     Pulse Temperature Height BMI (Body Mass Index)          72 98.6  F (37  C) (Oral) 5' 5.5\" (1.664 m) 31.46 kg/m2         Blood Pressure from Last 3 Encounters:   05/05/17 142/84   04/18/17 142/80   01/17/17 138/82    Weight from Last 3 Encounters:   05/05/17 192 lb (87.1 kg)   04/18/17 189 lb (85.7 kg)   01/17/17 193 lb (87.5 kg)              We Performed the Following     HPV High Risk Types DNA Cervical     Pap imaged thin layer screen with HPV - recommended age 30 - 65 years (select HPV order below)        Primary Care Provider Office Phone # Fax #    Madhav Samson [phone]       [address] [address]        Thank you!     Thank you for choosing [facility]  for your care. Our goal is always to provide you with excellent care. Hearing back from our patients is one way we can continue to improve our services. Please take a few minutes to complete the written survey that you may receive in the mail after your visit with us. Thank you!             Your Updated Medication List - Protect others around you: Learn how to safely use, store and throw away your medicines at www.disposemymeds.org.          This list is accurate as of: 5/5/17 10:05 AM.  Always use your most recent med list.                   Brand Name Dispense Instructions for use    B-12 1000 MCG Caps      Once daily       minocycline 100 MG tablet    DYNACIN    90 tablet    Take 1 tablet (100 mg) by mouth daily Ok to switch to capsules if cheaper       MIRENA (52 MG) 20 MCG/24HR IUD   Generic drug:  levonorgestrel      IUD       multivitamin  peds with iron 60 MG chewable tablet      Take 1 chew tab by mouth daily.       spironolactone " 25 MG tablet    ALDACTONE     Take 1 tablet (25 mg) by mouth daily       vitamin D 2000 UNITS Caps      1 capsules daily

## 2017-05-06 LAB
ALBUMIN SERPL-MCNC: 4.1 G/DL (ref 3.4–5)
ANION GAP SERPL CALCULATED.3IONS-SCNC: 7 MMOL/L (ref 3–14)
BUN SERPL-MCNC: 17 MG/DL (ref 7–30)
CALCIUM SERPL-MCNC: 9.1 MG/DL (ref 8.5–10.1)
CHLORIDE SERPL-SCNC: 108 MMOL/L (ref 94–109)
CO2 SERPL-SCNC: 26 MMOL/L (ref 20–32)
CREAT SERPL-MCNC: 1.08 MG/DL (ref 0.52–1.04)
GFR SERPL CREATININE-BSD FRML MDRD: 58 ML/MIN/1.7M2
GLUCOSE SERPL-MCNC: 77 MG/DL (ref 70–99)
PHOSPHATE SERPL-MCNC: 3.7 MG/DL (ref 2.5–4.5)
POTASSIUM SERPL-SCNC: 4.6 MMOL/L (ref 3.4–5.3)
SODIUM SERPL-SCNC: 141 MMOL/L (ref 133–144)

## 2017-05-08 ENCOUNTER — TELEPHONE (OUTPATIENT)
Dept: FAMILY MEDICINE | Facility: CLINIC | Age: 37
End: 2017-05-08

## 2017-05-08 DIAGNOSIS — R79.89 ELEVATED SERUM CREATININE: Primary | ICD-10-CM

## 2017-05-08 DIAGNOSIS — I10 BENIGN ESSENTIAL HYPERTENSION: ICD-10-CM

## 2017-05-08 NOTE — TELEPHONE ENCOUNTER
Please call Bernie to let her know that her labs show a persistently elevated k idney function. While it is mild it is still abnormal for her age. I would recommend that she see the kidney specialist to discuss and see if there is a cause. It may be due to her high blood pressure, but would like her to discuss further. In the meantime, I would like to start her on blood pressure medication. I believe she stated she couldn't take hydrochlorothiazide for some reason. Please inquire about this. If this is true then we will use a calcium channel blocker such as Amlodopine. She should start it right away and 5 mg and then make appointment with nephrologist for follow up. They can repeat blood pressure if she is getting in within 3-4 weeks. If it is longer than that, she should see someone here to recheck blood pressure and manage.    Jennifer Alford, [facility], PA-C  [facility]

## 2017-05-10 LAB
COPATH REPORT: ABNORMAL
PAP: ABNORMAL

## 2017-05-11 LAB
FINAL DIAGNOSIS: NORMAL
HPV HR 12 DNA CVX QL NAA+PROBE: NEGATIVE
HPV16 DNA SPEC QL NAA+PROBE: NEGATIVE
HPV18 DNA SPEC QL NAA+PROBE: NEGATIVE
SPECIMEN DESCRIPTION: NORMAL

## 2017-05-12 PROBLEM — I10 BENIGN ESSENTIAL HYPERTENSION: Status: ACTIVE | Noted: 2017-05-12

## 2017-05-12 PROBLEM — R87.610 ASCUS OF CERVIX WITH NEGATIVE HIGH RISK HPV: Status: ACTIVE | Noted: 2017-05-05

## 2017-05-12 RX ORDER — HYDROCHLOROTHIAZIDE 12.5 MG/1
12.5 TABLET ORAL DAILY
Qty: 90 TABLET | Refills: 1 | Status: SHIPPED | OUTPATIENT
Start: 2017-05-12 | End: 2017-10-31

## 2017-05-12 NOTE — TELEPHONE ENCOUNTER
Called and gave patient info below. She will follow up with nephrologist. Signed order.    Fan De La Rosa RN

## 2017-05-12 NOTE — TELEPHONE ENCOUNTER
Yes, then can follow up with provider here within 2-3 weeks or her nephrologist.  Please check pharmacy  GIUSEPPE Hanks, PA-C  [facility]

## 2017-06-26 ENCOUNTER — PRE VISIT (OUTPATIENT)
Dept: NEPHROLOGY | Facility: CLINIC | Age: 37
End: 2017-06-26

## 2017-06-26 NOTE — TELEPHONE ENCOUNTER
Bernie Croft is scheduled for future visit at [facility].    Patient is scheduled to see Dr. Eldridge  on 7/17/17  Reason for visit: Elevated blood pressure   Referring provider:Jennifer Alford PA-C   Has patient seen previous specialist? Yes.  Name of provider Jennifer Alford PA-C , [facility]     Medical Records:  Available in chart.  Patient was previously seen at a [facility] or [facility] facility.     REVIEW                                                      New patient packet mailed to patient: Yes  Medication reconciliation complete: Yes      PLAN/FOLLOW-UP NEEDED                                                      Patient Reminders Given:    Informed patient to bring an updated list of allergies, medications, pharmacy details and insurance information. Directed patient to come to the 2nd floor, check-in #4 for their appointment. Informed patient to call back if appointment needs to be cancelled or rescheduled at [phone].    Reminded patient to bring any outside records regarding this appointment or have them faxed to clinic at [phone].    Danita Castro Medical Assistant

## 2017-07-17 ENCOUNTER — OFFICE VISIT (OUTPATIENT)
Dept: NEPHROLOGY | Facility: CLINIC | Age: 37
End: 2017-07-17
Payer: COMMERCIAL

## 2017-07-17 VITALS
SYSTOLIC BLOOD PRESSURE: 139 MMHG | DIASTOLIC BLOOD PRESSURE: 96 MMHG | HEART RATE: 78 BPM | TEMPERATURE: 98.3 F | WEIGHT: 193.5 LBS | BODY MASS INDEX: 31.71 KG/M2

## 2017-07-17 DIAGNOSIS — I10 BENIGN ESSENTIAL HYPERTENSION: ICD-10-CM

## 2017-07-17 DIAGNOSIS — R79.89 ELEVATED SERUM CREATININE: Primary | ICD-10-CM

## 2017-07-17 DIAGNOSIS — E66.9 OBESITY, UNSPECIFIED OBESITY SEVERITY, UNSPECIFIED OBESITY TYPE: Chronic | ICD-10-CM

## 2017-07-17 LAB
ALBUMIN SERPL-MCNC: 4.3 G/DL (ref 3.4–5)
ALBUMIN UR-MCNC: NEGATIVE MG/DL
ANION GAP SERPL CALCULATED.3IONS-SCNC: 7 MMOL/L (ref 3–14)
APPEARANCE UR: CLEAR
BILIRUB UR QL STRIP: NEGATIVE
BUN SERPL-MCNC: 21 MG/DL (ref 7–30)
CALCIUM SERPL-MCNC: 9.1 MG/DL (ref 8.5–10.1)
CHLORIDE SERPL-SCNC: 102 MMOL/L (ref 94–109)
CO2 SERPL-SCNC: 29 MMOL/L (ref 20–32)
COLOR UR AUTO: YELLOW
CREAT SERPL-MCNC: 1.24 MG/DL (ref 0.52–1.04)
CREAT UR-MCNC: 82 MG/DL
GFR SERPL CREATININE-BSD FRML MDRD: 49 ML/MIN/1.7M2
GLUCOSE SERPL-MCNC: 99 MG/DL (ref 70–99)
GLUCOSE UR STRIP-MCNC: NEGATIVE MG/DL
HGB BLD-MCNC: 15 G/DL (ref 11.7–15.7)
HGB UR QL STRIP: NEGATIVE
KETONES UR STRIP-MCNC: NEGATIVE MG/DL
LEUKOCYTE ESTERASE UR QL STRIP: NEGATIVE
MICROALBUMIN UR-MCNC: 17 MG/L
MICROALBUMIN/CREAT UR: 20.87 MG/G CR (ref 0–25)
NITRATE UR QL: NEGATIVE
NON-SQ EPI CELLS #/AREA URNS LPF: NORMAL /LPF
PH UR STRIP: 5.5 PH (ref 5–7)
PHOSPHATE SERPL-MCNC: 3.4 MG/DL (ref 2.5–4.5)
POTASSIUM SERPL-SCNC: 4.1 MMOL/L (ref 3.4–5.3)
PROT UR-MCNC: 0.09 G/L
PROT/CREAT 24H UR: 0.12 G/G CR (ref 0–0.2)
PTH-INTACT SERPL-MCNC: 50 PG/ML (ref 12–72)
RBC #/AREA URNS AUTO: NORMAL /HPF (ref 0–2)
SODIUM SERPL-SCNC: 138 MMOL/L (ref 133–144)
SP GR UR STRIP: 1.01 (ref 1–1.03)
URN SPEC COLLECT METH UR: NORMAL
UROBILINOGEN UR STRIP-MCNC: NORMAL MG/DL (ref 0–2)
WBC #/AREA URNS AUTO: NORMAL /HPF (ref 0–2)

## 2017-07-17 PROCEDURE — 99000 SPECIMEN HANDLING OFFICE-LAB: CPT | Performed by: INTERNAL MEDICINE

## 2017-07-17 PROCEDURE — 80069 RENAL FUNCTION PANEL: CPT | Performed by: INTERNAL MEDICINE

## 2017-07-17 PROCEDURE — 99204 OFFICE O/P NEW MOD 45 MIN: CPT | Performed by: INTERNAL MEDICINE

## 2017-07-17 PROCEDURE — 83970 ASSAY OF PARATHORMONE: CPT | Performed by: INTERNAL MEDICINE

## 2017-07-17 PROCEDURE — 84156 ASSAY OF PROTEIN URINE: CPT | Performed by: INTERNAL MEDICINE

## 2017-07-17 PROCEDURE — 81001 URINALYSIS AUTO W/SCOPE: CPT | Performed by: INTERNAL MEDICINE

## 2017-07-17 PROCEDURE — 82043 UR ALBUMIN QUANTITATIVE: CPT | Performed by: INTERNAL MEDICINE

## 2017-07-17 PROCEDURE — 85018 HEMOGLOBIN: CPT | Performed by: INTERNAL MEDICINE

## 2017-07-17 PROCEDURE — 36415 COLL VENOUS BLD VENIPUNCTURE: CPT | Performed by: INTERNAL MEDICINE

## 2017-07-17 PROCEDURE — 82610 CYSTATIN C: CPT | Mod: 90 | Performed by: INTERNAL MEDICINE

## 2017-07-17 NOTE — NURSING NOTE
"Bernie Croft's goals for this visit include: CC  She requests these members of her care team be copied on today's visit information: No    PCP: Madhav Samson    Referring Provider:  Jennifer Alford PA-C  [place] [phone]    Chief Complaint   Patient presents with     Kidney Problem       Initial There were no vitals taken for this visit. Estimated body mass index is 31.46 kg/(m^2) as calculated from the following:    Height as of 5/5/17: 1.664 m (5' 5.5\").    Weight as of 5/5/17: 87.1 kg (192 lb).  Medication Reconciliation: complete  "

## 2017-07-17 NOTE — MR AVS SNAPSHOT
After Visit Summary   7/17/2017    Bernie Croft    MRN: [id number]           Patient Information     Date Of Birth          1980        Visit Information        Provider Department      7/17/2017 2:30 PM Bela Eldridge MD [facility]        Today's Diagnoses     Elevated serum creatinine    -  1      Care Instructions    1. Lab and urine tests today  2. Ultrasound of the kidney  3. We will be in touch with your results and determine a plan going forward.           Follow-ups after your visit        Your next 10 appointments already scheduled     Jul 18, 2017  3:00 PM CDT   US RENAL COMPLETE with MGMG JUDI US TECH   [facility] ([facility])    [id number] [place] [id number] [phone]           Please bring a list of your medicines (including vitamins, minerals and over-the-counter drugs). Also, tell your doctor about any allergies you may have. Wear comfortable clothes and leave your valuables at home.  You do not need to do anything special to prepare for your exam.  Please call the Imaging Department at your exam site with any questions.              Future tests that were ordered for you today     Open Future Orders        Priority Expected Expires Ordered    US Renal Complete Routine  7/17/2018 7/17/2017            Who to contact     If you have questions or need follow up information about today's clinic visit or your schedule please contact [facility] directly at [phone].  Normal or non-critical lab and imaging results will be communicated to you by MyChart, letter or phone within 4 business days after the clinic has received the results. If you do not hear from us within 7 days, please contact the clinic through MyChart or phone. If you have a critical or abnormal lab result, we will notify you by phone as soon as possible.  Submit refill requests through [employer] or call your pharmacy  and they will forward the refill request to us. Please allow 3 business days for your refill to be completed.          Additional Information About Your Visit        [employer] Information     [employer] gives you secure access to your electronic health record. If you see a primary care provider, you can also send messages to your care team and make appointments. If you have questions, please call your primary care clinic.  If you do not have a primary care provider, please call [phone] and they will assist you.      [employer] is an electronic gateway that provides easy, online access to your medical records. With [employer], you can request a clinic appointment, read your test results, renew a prescription or communicate with your care team.     To access your existing account, please contact your [facility] or call [phone] for assistance.        Care EveryWhere ID     This is your Care EveryWhere ID. This could be used by other organizations to access your [place] medical records  [id number]        Your Vitals Were     Pulse Temperature BMI (Body Mass Index)             78 98.3  F (36.8  C) (Oral) 31.71 kg/m2          Blood Pressure from Last 3 Encounters:   07/17/17 (!) 139/96   05/05/17 142/84   04/18/17 142/80    Weight from Last 3 Encounters:   07/17/17 87.8 kg (193 lb 8 oz)   05/05/17 87.1 kg (192 lb)   04/18/17 85.7 kg (189 lb)              We Performed the Following     Albumin Random Urine Quantitative     Cystatin C with GFR     Hemoglobin     JUST IN CASE     Parathyroid Hormone Intact     Protein  random urine     Renal panel (Alb, BUN, Ca, Cl, CO2, Creat, Gluc, Phos, K, Na)     UA with Microscopic reflex to Culture          Today's Medication Changes          These changes are accurate as of: 7/17/17  3:07 PM.  If you have any questions, ask your nurse or doctor.               Stop taking these medicines if you haven't already. Please contact your care team if you have  questions.     minocycline 100 MG tablet   Commonly known as:  DYNACIN   Stopped by:  Bela Eldridge MD                    Primary Care Provider Office Phone # Fax #    Madhav Samson [phone]       [address] [address]        Equal Access to Services     KEVIN REID : Hadii aad ku hadasho Soomaali, waaxda luqadaha, qaybta kaalmada adeegyada, waxay idiin hayaan adeeg kharash lachitran . So [facility] [phone].    ATENCIÓN: Si habla español, tiene a hunter disposición servicios gratuitos de asistencia lingüística. Shondaame al [phone].    We comply with applicable federal civil rights laws and [place] laws. We do not discriminate on the basis of race, color, national origin, age, disability sex, sexual orientation or gender identity.            Thank you!     Thank you for choosing [facility]  for your care. Our goal is always to provide you with excellent care. Hearing back from our patients is one way we can continue to improve our services. Please take a few minutes to complete the written survey that you may receive in the mail after your visit with us. Thank you!             Your Updated Medication List - Protect others around you: Learn how to safely use, store and throw away your medicines at www.disposemymeds.org.          This list is accurate as of: 7/17/17  3:07 PM.  Always use your most recent med list.                   Brand Name Dispense Instructions for use Diagnosis    B-12 1000 MCG Caps      Once daily        hydrochlorothiazide 12.5 MG Tabs tablet     90 tablet    Take 1 tablet (12.5 mg) by mouth daily    Benign essential hypertension       MIRENA (52 MG) 20 MCG/24HR IUD   Generic drug:  levonorgestrel      IUD        multivitamin  peds with iron 60 MG chewable tablet      Take 1 chew tab by mouth daily.        spironolactone 25 MG tablet    ALDACTONE     Take 1 tablet (25 mg) by mouth daily     Acne, unspecified acne type       vitamin D 2000 UNITS Caps      1 capsules daily

## 2017-07-17 NOTE — PATIENT INSTRUCTIONS
1. Lab and urine tests today  2. Ultrasound of the kidney  3. We will be in touch with your results and determine a plan going forward.

## 2017-07-18 ENCOUNTER — RADIANT APPOINTMENT (OUTPATIENT)
Dept: ULTRASOUND IMAGING | Facility: CLINIC | Age: 37
End: 2017-07-18
Attending: INTERNAL MEDICINE
Payer: COMMERCIAL

## 2017-07-18 DIAGNOSIS — R79.89 ELEVATED SERUM CREATININE: ICD-10-CM

## 2017-07-18 PROCEDURE — 76770 US EXAM ABDO BACK WALL COMP: CPT | Performed by: RADIOLOGY

## 2017-07-19 LAB — LAB SCANNED RESULT: ABNORMAL

## 2017-07-21 ENCOUNTER — TELEPHONE (OUTPATIENT)
Dept: FAMILY MEDICINE | Facility: CLINIC | Age: 37
End: 2017-07-21

## 2017-07-21 NOTE — TELEPHONE ENCOUNTER
Reason for Call:  Other call back    Detailed comments: Patient Bernie is calling to request a call back from a nurse.  She is on two medications and  States 140/90 is her norm blood pressure. Wants to discuss  She is going on vacation on Sunday  Phone Number Patient can be reached at: Home number on file [phone] (home)    Best Time: ANY    Can we leave a detailed message on this number? YES    Call taken on 7/21/2017 at 2:46 PM by Virginia Mota

## 2017-07-21 NOTE — TELEPHONE ENCOUNTER
"Called and spoke to  Bernie. At nephrology office this week BP was elevated.   BP Readings from Last 3 Encounters:   07/17/17 (!) 139/96   05/05/17 142/84   04/18/17 142/80      Checks BP at home also AM and PM - 140-150/.     She reports feeling \"stressed\" but denies headaches, dizziness, chest pain, nose bleeds, vision changes.     She is leaving for vacation this Sunday 7/23 and returning 7/30.     She is on HCTZ 12.5mg daily and Spironolactone 25mg daily. Taking as prescribed. She did take an extra Spironolactone last night and when she checked her blood pressure today it was \"no different.\"     Will route to provider to advise.  Would you like to adjust any medications or see her after her trip? RN did advise not to take any more 'extra' medication without consulting us first. Also educated on when to seek emergency care while on her trip.     Luz Diego RN      "

## 2017-07-23 NOTE — TELEPHONE ENCOUNTER
I have not sen patient since January.  Reviewed chart, seems like she was started on these meds by will and has seen been seeing nephrology for management of meds  Continue for now with current dose and check bp's  Follow up with provider asap with readings of bp  Madhav Samson D.O.

## 2017-07-30 ENCOUNTER — MYC MEDICAL ADVICE (OUTPATIENT)
Dept: NEPHROLOGY | Facility: CLINIC | Age: 37
End: 2017-07-30

## 2017-08-01 NOTE — TELEPHONE ENCOUNTER
"Patient is in clinic looking for lab results and to speak with someone about \"tweaking meds\". She states she has been calling and MyChart messaging without response.     Thanks,  Lashonda Cardoso [facility]     "

## 2017-08-01 NOTE — TELEPHONE ENCOUNTER
Dr. Eldridge to contact patient later today with plan.     Bernadette Rizo, RN, BSN  Nephrology Care Coordinator  [facility]

## 2017-08-02 ENCOUNTER — MYC MEDICAL ADVICE (OUTPATIENT)
Dept: FAMILY MEDICINE | Facility: CLINIC | Age: 37
End: 2017-08-02

## 2017-08-02 NOTE — TELEPHONE ENCOUNTER
Attempted to call this evening but no answer and there was not room on her voicemail to leave a message. I have sent a mychart with updates.   Bela Eldridge, DO

## 2017-08-02 NOTE — PROGRESS NOTES
August 1, 2017    I was asked to see this patient by Jennifer Alford PA-C for evaluation of elevated creatinine.     CC: elevated creatinine    HPI: Bernie Croft is a 37 year old female who presents for evaluation of elevated creatinine.  On review of her creatinine readings, it was 0.84 in 2014; 0.98-1.05 since April 2015. She had a CT done in 2006 which showed the left kidney noted to be slightly larger than the right. In regards to risk factors for kidney disease, she was dx with hypertension in her 20s. She is currently on spironolactone and HCTZ. Spironolactone was chosen for dermatologic reasons as well. She underwent lap band surgery and has lost a total of 75 lbs thus far. She has no diarrhea. She does have a BP cuff at home but no recent readings.    - History of Hematuria: yes   - Swelling: no  - Hx of UTIs: frequent bladder infections  - Hx of stones: no  - Rashes/Joint pain: previous rash - no known cause; no joint pain  - Family hx of kidney disease: no  - NSAID use: no - only rarely prn.        Allergies   Allergen Reactions     Bactrim [Sulfamethoxazole W/Trimethoprim]      Seasonal Allergies      Sulfa Drugs          Current Outpatient Prescriptions on File Prior to Visit:  hydrochlorothiazide 12.5 MG TABS tablet Take 1 tablet (12.5 mg) by mouth daily   spironolactone (ALDACTONE) 25 MG tablet Take 1 tablet (25 mg) by mouth daily   Cyanocobalamin (B-12) 1000 MCG CAPS Once daily   Cholecalciferol (VITAMIN D) 2000 UNITS CAPS 1 capsules daily   multivitamin  peds with iron (FLINTSTONES COMPLETE) 60 MG chewable tablet Take 1 chew tab by mouth daily.   MIRENA 20 MCG/24HR IU IUD IUD     No current facility-administered medications on file prior to visit.     Past Medical History:   Diagnosis Date     ABNORMAL PAP SMEAR OF CERVIX NEC AND HPV 2/16/2006 2002 required cryotherapy     ASCUS of cervix with negative high risk HPV 05/05/2017     Hypertension      PONV (postoperative nausea and vomiting)         Past Surgical History:   Procedure Laterality Date     ENT SURGERY      surgery for displaced nose     GYN SURGERY           LAPAROSCOPIC GASTRIC ADJUSTABLE BANDING  2012    Procedure: LAPAROSCOPIC GASTRIC ADJUSTABLE BANDING;  LAPAROSCOPIC GASTRIC ADJUSTABLE BANDING ;  Surgeon: Sina Day MD;  Location: [facility]     MAMMOPLASTY REDUCTION BILATERAL         Social History   Substance Use Topics     Smoking status: Never Smoker     Smokeless tobacco: Never Used      Comment: no second hand smoke     Alcohol use Yes      Comment: rare about 0-1 drink per month       Family History   Problem Relation Age of Onset     Thyroid Disease Father      DIABETES Maternal Grandmother      Hypertension Maternal Grandmother      Breast Cancer Maternal Grandmother      late 60's     DIABETES Maternal Grandfather      Hypertension Maternal Grandfather      Thyroid Disease Paternal Grandmother      Thyroid Disease Sister      C.A.D. No family hx of      Cancer - colorectal No family hx of      CEREBROVASCULAR DISEASE No family hx of      Asthma No family hx of        ROS: A 12 system review of systems was negative other than noted here or above.     Exam:  BP (!) 139/96 (BP Location: Left arm, Patient Position: Chair, Cuff Size: Adult Large)  Pulse 78  Temp 98.3  F (36.8  C) (Oral)  Wt 87.8 kg (193 lb 8 oz)  BMI 31.71 kg/m2    GENERAL APPEARANCE: alert and no distress  EYES: PERRL, no scleral icterus  HENT: mouth without ulcers or lesions  NECK: supple, no adenopathy  RESP: lungs clear to auscultation   CV: regular rhythm, normal rate, no rub  Extremities: no clubbing, cyanosis, or edema  SKIN: no rash  NEURO: mentation intact and speech normal  PSYCH: affect normal/bright    Results:    Office Visit on 2017   Component Date Value Ref Range Status     Sodium 2017 138  133 - 144 mmol/L Final     Potassium 2017 4.1  3.4 - 5.3 mmol/L Final     Chloride 2017 102  94 - 109 mmol/L Final      Carbon Dioxide 07/17/2017 29  20 - 32 mmol/L Final     Anion Gap 07/17/2017 7  3 - 14 mmol/L Final     Glucose 07/17/2017 99  70 - 99 mg/dL Final    Non Fasting     Urea Nitrogen 07/17/2017 21  7 - 30 mg/dL Final     Creatinine 07/17/2017 1.24* 0.52 - 1.04 mg/dL Final     GFR Estimate 07/17/2017 49* >60 mL/min/1.7m2 Final    Non  GFR Calc     GFR Estimate If Black 07/17/2017 60* >60 mL/min/1.7m2 Final    African American GFR Calc     Calcium 07/17/2017 9.1  8.5 - 10.1 mg/dL Final     Phosphorus 07/17/2017 3.4  2.5 - 4.5 mg/dL Final     Albumin 07/17/2017 4.3  3.4 - 5.0 g/dL Final     Protein Random Urine 07/17/2017 0.09  g/L Final     Protein Total Urine g/gr Creatinine 07/17/2017 0.12  0 - 0.2 g/g Cr Final     Creatinine Urine 07/17/2017 82  mg/dL Final     Albumin Urine mg/L 07/17/2017 17  mg/L Final     Albumin Urine mg/g Cr 07/17/2017 20.87  0 - 25 mg/g Cr Final     Color Urine 07/17/2017 Yellow   Final     Appearance Urine 07/17/2017 Clear   Final     Glucose Urine 07/17/2017 Negative  NEG mg/dL Final     Bilirubin Urine 07/17/2017 Negative  NEG Final     Ketones Urine 07/17/2017 Negative  NEG mg/dL Final     Specific Gravity Urine 07/17/2017 1.011  1.003 - 1.035 Final     Blood Urine 07/17/2017 Negative  NEG Final     pH Urine 07/17/2017 5.5  5.0 - 7.0 pH Final     Protein Albumin Urine 07/17/2017 Negative  NEG mg/dL Final     Urobilinogen mg/dL 07/17/2017 Normal  0.0 - 2.0 mg/dL Final     Nitrite Urine 07/17/2017 Negative  NEG Final     Leukocyte Esterase Urine 07/17/2017 Negative  NEG Final     Source 07/17/2017 Midstream Urine   Final     WBC Urine 07/17/2017 O - 2  0 - 2 /HPF Final     RBC Urine 07/17/2017 O - 2  0 - 2 /HPF Final     Squamous Epithelial /LPF Urine 07/17/2017 Few  FEW /LPF Final     Hemoglobin 07/17/2017 15.0  11.7 - 15.7 g/dL Final     Lab Scanned Result 07/17/2017 CYSTATIN C GFR-Scanned*  Final-Edited     Parathyroid Hormone Intact 07/17/2017 50  12 - 72 pg/mL Final        Assessment/Plan:   1. Elevated creatinine: risk factors for kidney injury include hypertension, abnormal size of right kidney, and potential effects of obesity given hyperfiltration associated with obesity. I am going to assess for kidney further by assessing for hematuria/proteinuria. Will repeat an ultrasound to reevaluate kidney size given the right was previously noted to be smaller. Educated to avoid NSAIDs. Will determine follow-up plan based on these results.     2. Hypertension: blood pressure is at goal of <140/90. It is possible that she has hemodynamic variability in the setting of 2 diuretics that is contributing to her abnormal kidney function.     3. Obesity: underwent lap band previously and has had 75 lb weight loss success. At risk for obesity related glomerulopathy. Encouraged on going weight loss.     Patient Instructions   1. Lab and urine tests today  2. Ultrasound of the kidney  3. We will be in touch with your results and determine a plan going forward.          Bela Eldridge, DO

## 2017-08-03 ENCOUNTER — TELEPHONE (OUTPATIENT)
Dept: NEPHROLOGY | Facility: CLINIC | Age: 37
End: 2017-08-03

## 2017-08-03 NOTE — TELEPHONE ENCOUNTER
[facility] Call Center    Phone Message    Name of Caller: NESHA CASANOVA    Phone Number: Cell number on file:    Telephone Information:   Mobile [phone]       Best time to return call: TODAY    May a detailed message be left on voicemail: no    Relation to patient: Self    Reason for Call: Other: Pt states she needs a 'tweaking of medications' and refills.    hydrochlorothiazide 12.5 MG TABS tablet and Aldactone 25mg.  Her regular doctor is on maternity leave and they recommended that patient contact Dr Eldridge for help with medication.  Please call back.     Action Taken: Message routed to:  Adult Clinics: Nephrology p [phone]

## 2017-08-03 NOTE — TELEPHONE ENCOUNTER
"Return call placed to patient.  No answer.  VM left informing patient that Dr. Eldridge will give her a call today regarding \"tweaking of medications\" and plan of care.    Encouraged patient to call back with any questions or concerns.   "

## 2017-08-12 ENCOUNTER — HEALTH MAINTENANCE LETTER (OUTPATIENT)
Age: 37
End: 2017-08-12

## 2017-08-24 ENCOUNTER — TRANSFERRED RECORDS (OUTPATIENT)
Dept: HEALTH INFORMATION MANAGEMENT | Facility: CLINIC | Age: 37
End: 2017-08-24

## 2017-08-24 ENCOUNTER — TELEPHONE (OUTPATIENT)
Dept: FAMILY MEDICINE | Facility: CLINIC | Age: 37
End: 2017-08-24

## 2017-08-24 NOTE — TELEPHONE ENCOUNTER
Reason for Call:  Other call back    Detailed comments: patient would like a call back to discuss medication concerns.  She saw her kidney specialist and they increased/upped the dose for the spironolactone (ALDACTONE) 25 MG tablet, but her face is still not cleared.  Patient is also on hydrochlorothiazide 12.5 MG TABS tablet.  Patient has been monitoring her BP and the bottom number is still high.  They has talked about doubling the spironolactone (ALDACTONE) 25 MG tablet, to help, but if that doesn't work, they may switch it to Accutane.  Patient would like to concentrate on her BP and wondering if/how this medication would effect that.  Discuss pro's and con's.  Please call to discuss.     Phone Number Patient can be reached at: Home number on file [phone] (home)    Best Time: any    Can we leave a detailed message on this number? YES    Call taken on 8/24/2017 at 12:15 PM by Giulia Arriaga

## 2017-09-06 ENCOUNTER — OFFICE VISIT (OUTPATIENT)
Dept: FAMILY MEDICINE | Facility: CLINIC | Age: 37
End: 2017-09-06
Payer: COMMERCIAL

## 2017-09-06 ENCOUNTER — TELEPHONE (OUTPATIENT)
Dept: FAMILY MEDICINE | Facility: CLINIC | Age: 37
End: 2017-09-06

## 2017-09-06 VITALS
BODY MASS INDEX: 31.02 KG/M2 | HEART RATE: 70 BPM | TEMPERATURE: 98.9 F | DIASTOLIC BLOOD PRESSURE: 88 MMHG | HEIGHT: 66 IN | SYSTOLIC BLOOD PRESSURE: 124 MMHG | WEIGHT: 193 LBS

## 2017-09-06 DIAGNOSIS — I10 BENIGN ESSENTIAL HYPERTENSION: Primary | ICD-10-CM

## 2017-09-06 DIAGNOSIS — B96.89 BV (BACTERIAL VAGINOSIS): ICD-10-CM

## 2017-09-06 DIAGNOSIS — L70.0 ACNE VULGARIS: ICD-10-CM

## 2017-09-06 DIAGNOSIS — N89.8 VAGINAL DISCHARGE: ICD-10-CM

## 2017-09-06 DIAGNOSIS — N76.0 BV (BACTERIAL VAGINOSIS): ICD-10-CM

## 2017-09-06 LAB
SPECIMEN SOURCE: ABNORMAL
WET PREP SPEC: ABNORMAL

## 2017-09-06 PROCEDURE — 99214 OFFICE O/P EST MOD 30 MIN: CPT | Performed by: FAMILY MEDICINE

## 2017-09-06 PROCEDURE — 87210 SMEAR WET MOUNT SALINE/INK: CPT | Performed by: FAMILY MEDICINE

## 2017-09-06 RX ORDER — SPIRONOLACTONE 100 MG/1
150 TABLET, FILM COATED ORAL DAILY
Qty: 45 TABLET | Refills: 3 | Status: SHIPPED | OUTPATIENT
Start: 2017-09-06 | End: 2018-07-06

## 2017-09-06 RX ORDER — METRONIDAZOLE 7.5 MG/G
1 GEL VAGINAL AT BEDTIME
Qty: 70 G | Refills: 1 | Status: SHIPPED | OUTPATIENT
Start: 2017-09-06 | End: 2017-09-11

## 2017-09-06 RX ORDER — TRETINOIN 0.05 G/100G
0.5 GEL TOPICAL AT BEDTIME
Qty: 45 G | Refills: 11 | Status: SHIPPED | OUTPATIENT
Start: 2017-09-06 | End: 2018-07-06

## 2017-09-06 RX ORDER — CLINDAMYCIN PHOSPHATE AND BENZOYL PEROXIDE 10; 50 MG/G; MG/G
0.5 GEL TOPICAL AT BEDTIME
Qty: 45 G | Refills: 11 | Status: SHIPPED | OUTPATIENT
Start: 2017-09-06 | End: 2018-07-06

## 2017-09-06 NOTE — MR AVS SNAPSHOT
After Visit Summary   9/6/2017    Bernie Croft    MRN: [id number]           Patient Information     Date Of Birth          1980        Visit Information        Provider Department      9/6/2017 11:40 AM Madhav Samson DO [facility]        Today's Diagnoses     Benign essential hypertension    -  1    Vaginal discharge        Acne vulgaris          Care Instructions          [facility]   Discharged by : Viry CONNER Certified Medical Assistant (AAMA)September 6, 2017 12:27 PM    Paper scripts provided to patient : none   If you have any questions regarding to your visit please contact your care team:   [facility] Hours Telephone Number   Dr. Viry Hdz   7am-7pm Monday - Thursday   7am-5pm Fridays  [phone]   (Appointment scheduling available 24/7)   RN Line   [phone] option 2     What options do I have for visits at the clinic other than the traditional office visit?   To expand how we care for you, many of our providers are utilizing electronic visits (e-visits) and telephone visits, when medically appropriate, for interactions with their patients rather than a visit in the clinic. We also offer nurse visits for many medical concerns. Just like any other service, we will bill your insurance company for this type of visit based on time spent on the phone with your provider. Not all insurance companies cover these visits. Please check with your medical insurance if this type of visit is covered. You will be responsible for any charges that are not paid by your insurance.   E-visits via [employer]: generally incur a $35.00 fee.   Telephone visits:   Time spent on the phone: *charged based on time that is spent on the phone in increments of 10 minutes. Estimated cost:   5-10 mins $30.00   11-20 mins. $59.00   21-30 mins. $85.00   Use [employer] (secure email communication and  access to your chart) to send your primary care provider a message or make an appointment. Ask someone on your Team how to sign up for [employer].   For a Price Quote for your services, please call our [employer] Line at [phone].   As always, Thank you for trusting us with your health care needs!                    [facility]:    Scheduling Appointments  [facility]  Call: [phone]    [facility]  Call: [phone]    [facility]  Call: [phone]    WHERE TO GO FOR CARE?    Clinic    Make an appointment if you:       Are sick (cold, cough, flu, sore throat, earache or in pain).       Have a small injury (sprain, small cut, burn or broken bone).       Need a physical exam, Pap smear, vaccine or prescription refill.       Have questions about your health or medicines.    To reach us:      Call [phone] ([phone]). Open 24 hours every day. (For counseling services, call [phone].)    Log into [employer] at [email or web address]. (Visit [email or web address] to create an account.) Hospital emergency room    An emergency is a serious or life- threatening problem that must be treated right away.    Call 033 or get to the hospital if you have:      Very bad or sudden:            - Chest pain or pressure         - Bleeding         - Head or belly pain         - Dizziness or trouble seeing, walking or                          Speaking      Problems breathing      Blood in your vomit or you are coughing up blood      A major injury (knocked out, loss of a finger or limb, rape, broken bone protruding from skin)    A mental health crisis. (Or call the Mental Health Crisis line at 1-488.448.9733 or Suicide Prevention Hotline at 1-778.187.6960.)    Open 24 hours every day. You don't need an appointment.     Urgent care    Visit urgent care for sickness or small injuries when the clinic is closed. You don't need  an appointment. To check hours or find an urgent care near you, visit [email or web address]. Online care    Get online care from [email or web address] for more than 70 common problems, like colds, allergies and infections. Open 24 hours every day at: [email or web address]   Need help deciding?    For advice about where to be seen, you may call your clinic and ask to speak with a nurse. We're here for you 24 hours every day.         If you are deaf or hard of hearing, please let us know. We provide many free services including sign language interpreters, oral interpreters, TTYs, telephone amplifiers, note takers and written materials.                 Follow-ups after your visit        Who to contact     If you have questions or need follow up information about today's clinic visit or your schedule please contact [facility] directly at [phone].  Normal or non-critical lab and imaging results will be communicated to you by MyChart, letter or phone within 4 business days after the clinic has received the results. If you do not hear from us within 7 days, please contact the clinic through [employer] or phone. If you have a critical or abnormal lab result, we will notify you by phone as soon as possible.  Submit refill requests through [employer] or call your pharmacy and they will forward the refill request to us. Please allow 3 business days for your refill to be completed.          Additional Information About Your Visit        MyChart Information     [employer] gives you secure access to your electronic health record. If you see a primary care provider, you can also send messages to your care team and make appointments. If you have questions, please call your primary care clinic.  If you do not have a primary care provider, please call [phone] and they will assist you.        Care EveryWhere ID     This is your Care EveryWhere ID. This could be used by other organizations to access your [facility]  "records  [id number]        Your Vitals Were     Pulse Temperature Height BMI (Body Mass Index)          70 98.9  F (37.2  C) (Oral) 5' 5.5\" (1.664 m) 31.63 kg/m2         Blood Pressure from Last 3 Encounters:   09/06/17 124/88   07/17/17 (!) 139/96   05/05/17 142/84    Weight from Last 3 Encounters:   09/06/17 193 lb (87.5 kg)   07/17/17 193 lb 8 oz (87.8 kg)   05/05/17 192 lb (87.1 kg)              We Performed the Following     Wet prep          Today's Medication Changes          These changes are accurate as of: 9/6/17 12:27 PM.  If you have any questions, ask your nurse or doctor.               Start taking these medicines.        Dose/Directions    clindamycin-benzoyl Per (Refr) 1.2-5 % Gel   Commonly known as:  DUAC   Used for:  Acne vulgaris   Started by:  Madhav Samson DO        Dose:  0.5 inch   Apply 0.5 inches topically At Bedtime   Quantity:  45 g   Refills:  11       tretinoin 0.05 % Gel   Used for:  Acne vulgaris   Started by:  Madhav Samson DO        Dose:  0.5 inch   Apply 0.5 inches topically At Bedtime Spread a pea size amount into affected area.  Use sunscreen SPF>20.   Quantity:  45 g   Refills:  11         These medicines have changed or have updated prescriptions.        Dose/Directions    * spironolactone 25 MG tablet   Commonly known as:  ALDACTONE   This may have changed:  Another medication with the same name was added. Make sure you understand how and when to take each.   Used for:  Acne, unspecified acne type   Changed by:  Jennifer Alford PA-C        Dose:  125 mg   Take 125 mg by mouth daily   Refills:  0       * spironolactone 100 MG tablet   Commonly known as:  ALDACTONE   This may have changed:  You were already taking a medication with the same name, and this prescription was added. Make sure you understand how and when to take each.   Used for:  Benign essential hypertension, Acne vulgaris   Changed by:  Madhav Samson DO        Dose:  150 mg "   Take 1.5 tablets (150 mg) by mouth daily   Quantity:  45 tablet   Refills:  3       * Notice:  This list has 2 medication(s) that are the same as other medications prescribed for you. Read the directions carefully, and ask your doctor or other care provider to review them with you.         Where to get your medicines      These medications were sent to [id number] - [place], [place] - [address]  [address], [place] [address]     Phone:  [phone]     clindamycin-benzoyl Per (Refr) 1.2-5 % Gel    spironolactone 100 MG tablet    tretinoin 0.05 % Gel                Primary Care Provider Office Phone # Fax #    Madhav Samson [phone]       [address] [facility] [address]        Equal Access to Services     KEVIN REID : Hadii rola mejias hadasho Soomaali, waaxda luqadaha, qaybta kaalmada adeanayada, brittany galdamez . So [facility] [phone].    ATENCIÓN: Si habla español, tiene a hunter disposición servicios gratuitos de asistencia lingüística. Llame al [phone].    We comply with applicable federal civil rights laws and [place] laws. We do not discriminate on the basis of race, color, national origin, age, disability sex, sexual orientation or gender identity.            Thank you!     Thank you for choosing [facility]  for your care. Our goal is always to provide you with excellent care. Hearing back from our patients is one way we can continue to improve our services. Please take a few minutes to complete the written survey that you may receive in the mail after your visit with us. Thank you!             Your Updated Medication List - Protect others around you: Learn how to safely use, store and throw away your medicines at www.disposemymeds.org.          This list is accurate as of: 9/6/17 12:27 PM.  Always use your most recent med list.                   Brand Name Dispense Instructions for use Diagnosis    B-12 1000  MCG Caps      Once daily        clindamycin-benzoyl Per (Refr) 1.2-5 % Gel    DUAC    45 g    Apply 0.5 inches topically At Bedtime    Acne vulgaris       hydrochlorothiazide 12.5 MG Tabs tablet     90 tablet    Take 1 tablet (12.5 mg) by mouth daily    Benign essential hypertension       MIRENA (52 MG) 20 MCG/24HR IUD   Generic drug:  levonorgestrel      IUD        multivitamin  peds with iron 60 MG chewable tablet      Take 1 chew tab by mouth daily.        * spironolactone 25 MG tablet    ALDACTONE     Take 125 mg by mouth daily    Acne, unspecified acne type       * spironolactone 100 MG tablet    ALDACTONE    45 tablet    Take 1.5 tablets (150 mg) by mouth daily    Benign essential hypertension, Acne vulgaris       tretinoin 0.05 % Gel     45 g    Apply 0.5 inches topically At Bedtime Spread a pea size amount into affected area.  Use sunscreen SPF>20.    Acne vulgaris       vitamin D 2000 UNITS Caps      1 capsules daily        * Notice:  This list has 2 medication(s) that are the same as other medications prescribed for you. Read the directions carefully, and ask your doctor or other care provider to review them with you.

## 2017-09-06 NOTE — PROGRESS NOTES
SUBJECTIVE:   Bernie Croft is a 37 year old female who presents to clinic today for the following health issues:    Telephone encounter 8/24/17  She saw her kidney specialist and they increased/upped the dose for the spironolactone (ALDACTONE) 25 MG tablet, but her face is still not cleared.  Patient is also on hydrochlorothiazide 12.5 MG TABS tablet.  Patient has been monitoring her BP and the bottom number is still high.  They has talked about doubling the spironolactone (ALDACTONE) 125 MG tablet, to help, but if that doesn't work, they may switch it to Accutane.  Patient would like to concentrate on her BP and wondering if/how this medication would effect that.  Discuss pro's and con's.    Hypertension Follow-up      Outpatient blood pressures are being checked at home.  Results are bottom number is still high.in the 90's. Top number is in the 120's and lower.    Low Salt Diet: no added salt    Problems taking medications regularly: No    Medication side effects: lips are really dry  Diet: regular (no restrictions) and low salt    denia-is her dermatologist  She has not tried any topical creams  She prefers to not do any oral    She has gone up to 150mg of aldactone and is tolerating it well as recommended by dermatology  She has not checked labs, she just increased few days ago    Vaginal discharge-history of BV, no std history   iud in place    Problem list and histories reviewed & adjusted, as indicated.  Additional history: as documented    Patient Active Problem List   Diagnosis     Acne     Hyperhidrosis of axilla     Obesity     CARDIOVASCULAR SCREENING; LDL GOAL LESS THAN 130     Melanocytic nevus     S/P Adjustable Band baraitric surgery      IUD (intrauterine device) in place     Abnormal mammogram     BMI 28.0-28.9,adult     Postsurgical nonabsorption     ASCUS of cervix with negative high risk HPV     Benign essential hypertension     Elevated serum creatinine     Past Surgical History:  "  Procedure Laterality Date     ENT SURGERY      surgery for displaced nose     GYN SURGERY           LAPAROSCOPIC GASTRIC ADJUSTABLE BANDING  2012    Procedure: LAPAROSCOPIC GASTRIC ADJUSTABLE BANDING;  LAPAROSCOPIC GASTRIC ADJUSTABLE BANDING ;  Surgeon: Sina Day MD;  Location: [facility]     MAMMOPLASTY REDUCTION BILATERAL         Social History   Substance Use Topics     Smoking status: Never Smoker     Smokeless tobacco: Never Used      Comment: no second hand smoke     Alcohol use Yes      Comment: rare about 0-1 drink per month     Family History   Problem Relation Age of Onset     Thyroid Disease Father      DIABETES Maternal Grandmother      Hypertension Maternal Grandmother      Breast Cancer Maternal Grandmother      late 60's     DIABETES Maternal Grandfather      Hypertension Maternal Grandfather      Thyroid Disease Paternal Grandmother      Thyroid Disease Sister      C.A.D. No family hx of      Cancer - colorectal No family hx of      CEREBROVASCULAR DISEASE No family hx of      Asthma No family hx of              Reviewed and updated as needed this visit by clinical staffTobacco  Allergies  Med Hx  Surg Hx  Fam Hx  Soc Hx      Reviewed and updated as needed this visit by Provider         ROS:  Constitutional, HEENT, cardiovascular, pulmonary, GI, , musculoskeletal, neuro, skin, endocrine and psych systems are negative, except as otherwise noted.      OBJECTIVE:   /88 (BP Location: Right arm, Patient Position: Chair, Cuff Size: Adult Large)  Pulse 70  Temp 98.9  F (37.2  C) (Oral)  Ht 5' 5.5\" (1.664 m)  Wt 193 lb (87.5 kg)  BMI 31.63 kg/m2  Body mass index is 31.63 kg/(m^2).  GENERAL: healthy, alert and no distress  NECK: no adenopathy, no asymmetry, masses, or scars and thyroid normal to palpation  RESP: lungs clear to auscultation - no rales, rhonchi or wheezes  CV: regular rate and rhythm, normal S1 S2, no S3 or S4, no murmur, click or rub, no peripheral edema and " peripheral pulses strong  ABDOMEN: soft, nontender, no hepatosplenomegaly, no masses and bowel sounds normal  MS: no gross musculoskeletal defects noted, no edema    Diagnostic Test Results:  Results for orders placed or performed in visit on 09/06/17 (from the past 24 hour(s))   Wet prep   Result Value Ref Range    Specimen Description Vagina     Wet Prep No Trichomonas seen     Wet Prep Clue cells seen (A)     Wet Prep No yeast seen          ASSESSMENT/PLAN:       ICD-10-CM    1. Benign essential hypertension I10 spironolactone (ALDACTONE) 100 MG tablet   2. Vaginal discharge N89.8 Wet prep   3. Acne vulgaris L70.0 tretinoin 0.05 % GEL     clindamycin-benzoyl Per, Refr, (DUAC) 1.2-5 % GEL     spironolactone (ALDACTONE) 100 MG tablet   4. BV (bacterial vaginosis) N76.0 metroNIDAZOLE (METROGEL) 0.75 % vaginal gel    B96.89      hypertension-stable now on current meds, currently 150mg spirolactone, and HCTC, refilled, check labs in one week  Acne-advised close monitoring, not resolving, added topical meds, recent increase on spirolactone, follow up with dermatology or here in 2-3 weeks  BV-treat today, refilled X 1    Follow up in 6-8 weeks    See Patient Instructions    Madhav Samson DO  [facility]

## 2017-09-06 NOTE — NURSING NOTE
"Chief Complaint   Patient presents with     Hypertension     Derm Problem     acne       Initial /88 (BP Location: Right arm, Patient Position: Chair, Cuff Size: Adult Large)  Pulse 70  Temp 98.9  F (37.2  C) (Oral)  Ht 5' 5.5\" (1.664 m)  Wt 193 lb (87.5 kg)  BMI 31.63 kg/m2 Estimated body mass index is 31.63 kg/(m^2) as calculated from the following:    Height as of this encounter: 5' 5.5\" (1.664 m).    Weight as of this encounter: 193 lb (87.5 kg).  Medication Reconciliation: complete   Charity Goss MA      "

## 2017-09-06 NOTE — TELEPHONE ENCOUNTER
Per pharmacy, the patient's insurance requires a prior authorization for one or more of the patient's medications.  Please initiate prior authorization or call/fax pharmacy to change patient's medication.    Medication: tretinoin 0.05 % GEL  Strength: 0.05%  Sig: Apply 0.5 inches topically At Bedtime Spread a pea size amount into affected area.  Use sunscreen SPF>20.    Pharmacy: [facility] [place] [address]  Phone: [employer]?  Fax: [phone]    Prescription Insurance: UNK?  Phone: UNK?  ID: UNK?    Additional Comments: OBTAIN PA USING [employer] Help Desk Phone: [phone]  ePA Form placed in Dr. Samson's MA Folder

## 2017-09-06 NOTE — PATIENT INSTRUCTIONS
[facility]   Discharged by : Viry CONNER Certified Medical Assistant (AAMA)September 6, 2017 12:27 PM    Paper scripts provided to patient : none   If you have any questions regarding to your visit please contact your care team:   [facility] Clinic Hours Telephone Number   Dr. Viry Hdz   7am-7pm Monday - Thursday   7am-5pm Fridays  [phone]   (Appointment scheduling available 24/7)   RN Line   [phone] option 2     What options do I have for visits at the clinic other than the traditional office visit?   To expand how we care for you, many of our providers are utilizing electronic visits (e-visits) and telephone visits, when medically appropriate, for interactions with their patients rather than a visit in the clinic. We also offer nurse visits for many medical concerns. Just like any other service, we will bill your insurance company for this type of visit based on time spent on the phone with your provider. Not all insurance companies cover these visits. Please check with your medical insurance if this type of visit is covered. You will be responsible for any charges that are not paid by your insurance.   E-visits via [employer]: generally incur a $35.00 fee.   Telephone visits:   Time spent on the phone: *charged based on time that is spent on the phone in increments of 10 minutes. Estimated cost:   5-10 mins $30.00   11-20 mins. $59.00   21-30 mins. $85.00   Use [employer] (secure email communication and access to your chart) to send your primary care provider a message or make an appointment. Ask someone on your Team how to sign up for [employer].   For a Price Quote for your services, please call our Consumer Price Line at [phone].   As always, Thank you for trusting us with your health care needs!                    [facility]:    Scheduling Appointments  [facility]  Call:  [phone]    [facility], [facility]  Call: [phone]    [facility]  Call: [phone]    WHERE TO GO FOR CARE?    Clinic    Make an appointment if you:       Are sick (cold, cough, flu, sore throat, earache or in pain).       Have a small injury (sprain, small cut, burn or broken bone).       Need a physical exam, Pap smear, vaccine or prescription refill.       Have questions about your health or medicines.    To reach us:      Call [phone] ([phone]). Open 24 hours every day. (For counseling services, call [phone].)    Log into t-Art at [employer]. (Visit [email or web address] to create an account.) Hospital emergency room    An emergency is a serious or life- threatening problem that must be treated right away.    Call 787 or get to the hospital if you have:      Very bad or sudden:            - Chest pain or pressure         - Bleeding         - Head or belly pain         - Dizziness or trouble seeing, walking or                          Speaking      Problems breathing      Blood in your vomit or you are coughing up blood      A major injury (knocked out, loss of a finger or limb, rape, broken bone protruding from skin)    A mental health crisis. (Or call the Mental Health Crisis line at 1-770.643.2550 or Suicide Prevention Hotline at 1-610.384.9598.)    Open 24 hours every day. You don't need an appointment.     Urgent care    Visit urgent care for sickness or small injuries when the clinic is closed. You don't need an appointment. To check hours or find an urgent care near you, visit [email or web address]. Online care    Get online care from [email or web address] for more than 70 common problems, like colds, allergies and infections. Open 24 hours every day at: [email or web address]   Need help deciding?    For advice about where to be seen, you may call your clinic and ask to speak with a nurse. We're here for you 24 hours every day.          If you are deaf or hard of hearing, please let us know. We provide many free services including sign language interpreters, oral interpreters, TTYs, telephone amplifiers, note takers and written materials.

## 2017-09-07 NOTE — TELEPHONE ENCOUNTER
Dr. Samson, would you like to start a PA? Please advice, thank you.  Aisha Burger CMA ([facility])

## 2017-09-12 NOTE — TELEPHONE ENCOUNTER
Yes, we should start a PA, provider did explain that insurance may not cover the medication.    Viry CONNER, Certified Medical Assistant (AAMA)September 12, 2017 3:31 PM

## 2017-09-13 NOTE — TELEPHONE ENCOUNTER
Pharmacy calling to check if PA has been started or not.  Please start PA and inform them when done, pharmacy pended.

## 2017-09-14 ENCOUNTER — TELEPHONE (OUTPATIENT)
Dept: FAMILY MEDICINE | Facility: CLINIC | Age: 37
End: 2017-09-14

## 2017-09-14 NOTE — TELEPHONE ENCOUNTER
PA approved.  Effective date: 8/15/17 thru 9/13/20  PA reference #: [facility] [id number]  Pt. notified:   Yes   Pt. informed directly.    Viry CONNER, Certified Medical Assistant (AAMA)September 14, 2017 10:00 AM

## 2017-09-14 NOTE — TELEPHONE ENCOUNTER
LISETTE. After doubling the dose of the spironolactone (per dermatology) patient noted tingling in lips and then peeling of lips (blood pressure was good though).  She backed off of that dose.  She changed to taking 1 spironolactone and 1 hydrochlorothiazide.  Blood pressure has gone up now to 140/117.  She is waiting for her new cuff to arrive.  Yesterday she took 2 HCTZ.  She hasn't taken her BP today but she feels fine.  She will be observing her BP's for now.    Viry CONNER, Certified Medical Assistant (AAMA)September 14, 2017 9:59 AM

## 2017-10-24 ENCOUNTER — MYC MEDICAL ADVICE (OUTPATIENT)
Dept: FAMILY MEDICINE | Facility: CLINIC | Age: 37
End: 2017-10-24

## 2017-10-27 ENCOUNTER — VIRTUAL VISIT (OUTPATIENT)
Dept: FAMILY MEDICINE | Facility: OTHER | Age: 37
End: 2017-10-27

## 2017-10-27 ENCOUNTER — TELEPHONE (OUTPATIENT)
Dept: FAMILY MEDICINE | Facility: CLINIC | Age: 37
End: 2017-10-27

## 2017-10-27 NOTE — TELEPHONE ENCOUNTER
Reason for call:  Patient reporting a symptom    Symptom or request: Possible bladder infection.  Patient is requesting prescription be called to pharmacy.  Please call to advise.    Duration (how long have symptoms been present): 2 days    Have you been treated for this before? No    Additional comments:     Phone Number patient can be reached at:  Home number on file [phone] (home)    Best Time:  Any    Can we leave a detailed message on this number:  YES    Call taken on 10/27/2017 at 7:39 AM by Kingsley Arevalo

## 2017-10-27 NOTE — PROGRESS NOTES
"Date:   Clinician: Antwan Nicholson  Clinician NPI: [id number]  Patient: Bernie Croft  Patient : 1980  Patient Address: [address] [place] [address] [address], [place] [address]  Patient Phone: [phone]  Visit Protocol: UTI  Patient Summary:  Bernie is a 37 year old ( : 1980 ) female who initiated a Visit for a presumed bladder infection. When asked the question \"Please sign me up to receive news, health information and promotions from [employer].\", Bernie responded \"No\".    Her symptoms began 3 days ago and consist of hesitation, urgency, dysuria, hematuria, urinary frequency, loss of appetite, and foul smelling urine.   Symptom Details     Urinary Frequency: Every 2-3 hours     Hematuria: She has seen urine with occasional blood 1 time(s) since the onset of her symptoms. She is certain the blood is not from her vagina.      She denies abdominal pain, vaginal discharge, flank pain, vomiting, recent antibiotic use, urinary incontinence, feeling feverish, chills, and nausea. Bernie has never had kidney stones. She has not been hospitalized, been a patient in a nursing home, or had a catheter in the past two weeks. She denies risk factors for sexually transmitted infections.   Bernie has had two (2) UTIs in the past 12 months. Her most recent bladder infection was not within the last 4 weeks. Her current symptoms are similar to the previous UTI symptoms. She took an antibiotic for her last infection but does not remember which one.   She has experienced side effects (upset stomach, vomiting, or diarrhea) from taking Bactrim DS (trimethoprim/sulfamethoxazole).  Bernie typically gets yeast infections when she takes antibiotics.  She denies pregnancy and denies breastfeeding. She does not menstruate.   She does NOT smoke or use smokeless tobacco.   MEDICATIONS:   Patient free text response:  Hydrochlorothyiside   , ALLERGIES:   sulfa (Bactrim/Septra)    Clinician Response:  Monicar Bernie,  Based on the " information you have provided, you likely have a bladder infection, also called an acute urinary tract infection (UTI). The blood in your urine appears when the infection causes irritation of the bladder or urine carrying structures.   To treat your infection, I am prescribing:   Nitrofurantoin (Macrobid). Swallow one (1) tablet twice a day for 5 days. Take the tablet with food. Continue taking the tablets even if you feel better before all the medication is gone. There is no refill with this prescription.   Some people develop allergies to antibiotics. If you notice a new rash, significant swelling, or difficulty breathing, stop the medication immediately and go into a clinic for physical evaluation.   To help treat your current UTI and prevent future occurrences, remember to:     Drink 8-10, 8-ounce glasses of water daily.    Urinate after sexual intercourse.    Wipe front to back after using the bathroom.     Some women may develop a yeast infection as a side effect of taking antibiotics. If you notice symptoms of a yeast infection, [facility] can help treat that condition as well. Simply log in and complete another Visit, which will cover all of the necessary questions to determine the best treatment for you.   You should visit a clinic for a follow-up visit if your symptoms do not improve in 1-2 days or if you experience another urinary tract infection soon after completing this treatment.  If you become pregnant during this course of treatment, stop taking the medication and contact your primary care provider.   Diagnosis: Acute Uncomplicated Bladder Infection  Diagnosis ICD: N39.0  Prescription: nitrofurantoin (Macrobid) 100mg oral tablet 10 tablets, 5 days supply. Take one tablet by mouth two times a day for 5 days. Refills: 0, Refill as needed: no, Allow substitutions: yes

## 2017-10-27 NOTE — TELEPHONE ENCOUNTER
Called patient- she went too long without going to the bathroom.. Patient denies blood in her urine. Burning, frequency, & urgency- she woke up at 0100 with it hurting.  She will do [email or web address] as she has appts today. Patient verbalized understanding.   Dolly Shahid RN

## 2017-10-31 ENCOUNTER — OFFICE VISIT (OUTPATIENT)
Dept: FAMILY MEDICINE | Facility: CLINIC | Age: 37
End: 2017-10-31
Payer: COMMERCIAL

## 2017-10-31 VITALS
TEMPERATURE: 98.7 F | SYSTOLIC BLOOD PRESSURE: 136 MMHG | WEIGHT: 193 LBS | HEIGHT: 66 IN | HEART RATE: 91 BPM | BODY MASS INDEX: 31.02 KG/M2 | DIASTOLIC BLOOD PRESSURE: 86 MMHG

## 2017-10-31 DIAGNOSIS — I10 BENIGN ESSENTIAL HYPERTENSION: Primary | ICD-10-CM

## 2017-10-31 DIAGNOSIS — Z12.31 VISIT FOR SCREENING MAMMOGRAM: ICD-10-CM

## 2017-10-31 DIAGNOSIS — L70.0 ACNE VULGARIS: ICD-10-CM

## 2017-10-31 PROCEDURE — 99213 OFFICE O/P EST LOW 20 MIN: CPT | Performed by: FAMILY MEDICINE

## 2017-10-31 PROCEDURE — 80048 BASIC METABOLIC PNL TOTAL CA: CPT | Performed by: INTERNAL MEDICINE

## 2017-10-31 PROCEDURE — 36415 COLL VENOUS BLD VENIPUNCTURE: CPT | Performed by: INTERNAL MEDICINE

## 2017-10-31 RX ORDER — HYDROCHLOROTHIAZIDE 12.5 MG/1
12.5 TABLET ORAL DAILY
Qty: 90 TABLET | Refills: 1 | Status: SHIPPED | OUTPATIENT
Start: 2017-10-31 | End: 2018-07-06

## 2017-10-31 NOTE — MR AVS SNAPSHOT
After Visit Summary   10/31/2017    Bernie Croft    MRN: [id number]           Patient Information     Date Of Birth          1980        Visit Information        Provider Department      10/31/2017 1:40 PM Madhav Samson DO [facility]        Today's Diagnoses     Benign essential hypertension    -  1    Visit for screening mammogram        Acne vulgaris           Follow-ups after your visit        Future tests that were ordered for you today     Open Future Orders        Priority Expected Expires Ordered    MA SCREENING DIGITAL BILAT - Future  (s+30) Routine  10/31/2018 10/31/2017            Who to contact     If you have questions or need follow up information about today's clinic visit or your schedule please contact [facility] directly at [phone].  Normal or non-critical lab and imaging results will be communicated to you by MyChart, letter or phone within 4 business days after the clinic has received the results. If you do not hear from us within 7 days, please contact the clinic through [employer] or phone. If you have a critical or abnormal lab result, we will notify you by phone as soon as possible.  Submit refill requests through [employer] or call your pharmacy and they will forward the refill request to us. Please allow 3 business days for your refill to be completed.          Additional Information About Your Visit        MyChart Information     [employer] gives you secure access to your electronic health record. If you see a primary care provider, you can also send messages to your care team and make appointments. If you have questions, please call your primary care clinic.  If you do not have a primary care provider, please call [phone] and they will assist you.        Care EveryWhere ID     This is your Care EveryWhere ID. This could be used by other organizations to access your [facility] medical records  [id number]        Your  "Vitals Were     Pulse Temperature Height Breastfeeding? BMI (Body Mass Index)       91 98.7  F (37.1  C) (Oral) 5' 5.5\" (1.664 m) No 31.63 kg/m2        Blood Pressure from Last 3 Encounters:   10/31/17 136/86   09/06/17 124/88   07/17/17 (!) 139/96    Weight from Last 3 Encounters:   10/31/17 193 lb (87.5 kg)   09/06/17 193 lb (87.5 kg)   07/17/17 193 lb 8 oz (87.8 kg)              We Performed the Following     Basic metabolic panel          Where to get your medicines      These medications were sent to [place] [id number] [facility] - [place] - [address]  [address] [place] [address]     Phone:  [phone]     hydrochlorothiazide 12.5 MG Tabs tablet          Primary Care Provider Office Phone # Fax #    Madhav Manzo Chapin,  [phone]       [address] [place] [address]        Equal Access to Services     [facility]: Hadii aad ku hadasho Soomaali, waaxda luqadaha, qaybta kaalmada adeegyarussel, brittany galdamez . So [facility] [phone].    ATENCIÓN: Si habla español, tiene a hunter disposición servicios gratuitos de asistencia lingüística. Brittney al [phone].    We comply with applicable federal civil rights laws and [place] laws. We do not discriminate on the basis of race, color, national origin, age, disability, sex, sexual orientation, or gender identity.            Thank you!     Thank you for choosing [facility]  for your care. Our goal is always to provide you with excellent care. Hearing back from our patients is one way we can continue to improve our services. Please take a few minutes to complete the written survey that you may receive in the mail after your visit with us. Thank you!             Your Updated Medication List - Protect others around you: Learn how to safely use, store and throw away your medicines at www.disposemymeds.org.          This list is accurate as of: 10/31/17  8:20 PM.  Always use your most recent " med list.                   Brand Name Dispense Instructions for use Diagnosis    B-12 1000 MCG Caps      Once daily        clindamycin-benzoyl Per (Refr) 1.2-5 % Gel    DUAC    45 g    Apply 0.5 inches topically At Bedtime    Acne vulgaris       hydrochlorothiazide 12.5 MG Tabs tablet     90 tablet    Take 1 tablet (12.5 mg) by mouth daily    Benign essential hypertension       MIRENA (52 MG) 20 MCG/24HR IUD   Generic drug:  levonorgestrel      IUD        multivitamin  peds with iron 60 MG chewable tablet      Take 1 chew tab by mouth daily.        PROBIOTIC ACIDOPHILUS PO           * spironolactone 25 MG tablet    ALDACTONE     Take 125 mg by mouth daily    Acne, unspecified acne type       * spironolactone 100 MG tablet    ALDACTONE    45 tablet    Take 1.5 tablets (150 mg) by mouth daily    Benign essential hypertension, Acne vulgaris       tretinoin 0.05 % Gel     45 g    Apply 0.5 inches topically At Bedtime Spread a pea size amount into affected area.  Use sunscreen SPF>20.    Acne vulgaris       vitamin D 2000 UNITS Caps      1 capsules daily        * Notice:  This list has 2 medication(s) that are the same as other medications prescribed for you. Read the directions carefully, and ask your doctor or other care provider to review them with you.

## 2017-10-31 NOTE — PROGRESS NOTES
SUBJECTIVE:   Bernie Croft is a 37 year old female who presents to clinic today for the following health issues:      Hypertension Follow-up      Outpatient blood pressures are being checked at home.  Results are 134/88 this morning.    Low Salt Diet: no added salt        Amount of exercise or physical activity: 2-3 days/week for an average of 45-60 minutes    Problems taking medications regularly: No    Medication side effects: none    Diet: carbohydrate counting    * discuss possible labs  * changing life insurance, would like to make sure that everything is going well        She has changed her lifestyle and dietary changes for the last 26 days,  She has stopped spirolactone-about 4 weeks  Continued on  hctz       Problem list and histories reviewed & adjusted, as indicated.  Additional history: as documented    Patient Active Problem List   Diagnosis     Acne     Hyperhidrosis of axilla     Obesity     CARDIOVASCULAR SCREENING; LDL GOAL LESS THAN 130     Melanocytic nevus     S/P Adjustable Band baraitric surgery      IUD (intrauterine device) in place     Abnormal mammogram     BMI 28.0-28.9,adult     Postsurgical nonabsorption     ASCUS of cervix with negative high risk HPV     Benign essential hypertension     Elevated serum creatinine     Past Surgical History:   Procedure Laterality Date     ENT SURGERY      surgery for displaced nose     GYN SURGERY           LAPAROSCOPIC GASTRIC ADJUSTABLE BANDING  2012    Procedure: LAPAROSCOPIC GASTRIC ADJUSTABLE BANDING;  LAPAROSCOPIC GASTRIC ADJUSTABLE BANDING ;  Surgeon: Sina Day MD;  Location: [facility] OR     MAMMOPLASTY REDUCTION BILATERAL         Social History   Substance Use Topics     Smoking status: Never Smoker     Smokeless tobacco: Never Used      Comment: no second hand smoke     Alcohol use Yes      Comment: rare about 0-1 drink per month     Family History   Problem Relation Age of Onset     Thyroid Disease Father      DIABETES Maternal  "Grandmother      Hypertension Maternal Grandmother      Breast Cancer Maternal Grandmother      late 60's     DIABETES Maternal Grandfather      Hypertension Maternal Grandfather      Thyroid Disease Paternal Grandmother      Thyroid Disease Sister      C.A.D. No family hx of      Cancer - colorectal No family hx of      CEREBROVASCULAR DISEASE No family hx of      Asthma No family hx of              Reviewed and updated as needed this visit by clinical staffTobacco  Allergies  Med Hx  Surg Hx  Fam Hx  Soc Hx      Reviewed and updated as needed this visit by Provider         ROS:  Constitutional, HEENT, cardiovascular, pulmonary, GI, , musculoskeletal, neuro, skin, endocrine and psych systems are negative, except as otherwise noted.      OBJECTIVE:   /86  Pulse 91  Temp 98.7  F (37.1  C) (Oral)  Ht 5' 5.5\" (1.664 m)  Wt 193 lb (87.5 kg)  Breastfeeding? No  BMI 31.63 kg/m2  Body mass index is 31.63 kg/(m^2).  GENERAL: healthy, alert and no distress  NECK: no adenopathy, no asymmetry, masses, or scars and thyroid normal to palpation  RESP: lungs clear to auscultation - no rales, rhonchi or wheezes  CV: regular rate and rhythm, normal S1 S2, no S3 or S4, no murmur, click or rub, no peripheral edema and peripheral pulses strong  ABDOMEN: soft, nontender, no hepatosplenomegaly, no masses and bowel sounds normal  MS: no gross musculoskeletal defects noted, no edema    Diagnostic Test Results:  none     ASSESSMENT/PLAN:       ICD-10-CM    1. Benign essential hypertension I10 hydrochlorothiazide 12.5 MG TABS tablet     Basic metabolic panel   2. Visit for screening mammogram Z12.31 MA SCREENING DIGITAL BILAT - Future  (s+30)   3. Acne vulgaris L70.0      htn-she is off meds, lost weight and bp stable, advised close monitoring, if elevated restart med  Acne-stale not off spiraloctone, only  On topical  mammo to be done      See Patient Instructions    Madhav Samson DO  [facility] [place]" GABY

## 2017-10-31 NOTE — NURSING NOTE
"Chief Complaint   Patient presents with     Chronic Disease Management     blood pressure follow-up       Initial /86  Pulse 91  Temp 98.7  F (37.1  C) (Oral)  Ht 5' 5.5\" (1.664 m)  Wt 193 lb (87.5 kg)  Breastfeeding? No  BMI 31.63 kg/m2 Estimated body mass index is 31.63 kg/(m^2) as calculated from the following:    Height as of this encounter: 5' 5.5\" (1.664 m).    Weight as of this encounter: 193 lb (87.5 kg).  Medication Reconciliation: complete    "

## 2017-11-01 LAB
ANION GAP SERPL CALCULATED.3IONS-SCNC: 8 MMOL/L (ref 3–14)
BUN SERPL-MCNC: 15 MG/DL (ref 7–30)
CALCIUM SERPL-MCNC: 8.9 MG/DL (ref 8.5–10.1)
CHLORIDE SERPL-SCNC: 107 MMOL/L (ref 94–109)
CO2 SERPL-SCNC: 28 MMOL/L (ref 20–32)
CREAT SERPL-MCNC: 1.28 MG/DL (ref 0.52–1.04)
GFR SERPL CREATININE-BSD FRML MDRD: 47 ML/MIN/1.7M2
GLUCOSE SERPL-MCNC: 90 MG/DL (ref 70–99)
POTASSIUM SERPL-SCNC: 4 MMOL/L (ref 3.4–5.3)
SODIUM SERPL-SCNC: 143 MMOL/L (ref 133–144)

## 2017-11-06 ENCOUNTER — MYC MEDICAL ADVICE (OUTPATIENT)
Dept: NEPHROLOGY | Facility: CLINIC | Age: 37
End: 2017-11-06

## 2017-11-06 ENCOUNTER — OFFICE VISIT (OUTPATIENT)
Dept: FAMILY MEDICINE | Facility: CLINIC | Age: 37
End: 2017-11-06
Payer: COMMERCIAL

## 2017-11-06 ENCOUNTER — MYC MEDICAL ADVICE (OUTPATIENT)
Dept: FAMILY MEDICINE | Facility: CLINIC | Age: 37
End: 2017-11-06

## 2017-11-06 ENCOUNTER — TELEPHONE (OUTPATIENT)
Dept: FAMILY MEDICINE | Facility: CLINIC | Age: 37
End: 2017-11-06

## 2017-11-06 VITALS
BODY MASS INDEX: 31.02 KG/M2 | SYSTOLIC BLOOD PRESSURE: 126 MMHG | HEART RATE: 70 BPM | TEMPERATURE: 98.9 F | HEIGHT: 66 IN | WEIGHT: 193 LBS | DIASTOLIC BLOOD PRESSURE: 74 MMHG

## 2017-11-06 DIAGNOSIS — I10 BENIGN ESSENTIAL HYPERTENSION: ICD-10-CM

## 2017-11-06 DIAGNOSIS — R30.0 DYSURIA: Primary | ICD-10-CM

## 2017-11-06 DIAGNOSIS — R94.4 ABNORMAL RENAL FUNCTION TEST: ICD-10-CM

## 2017-11-06 DIAGNOSIS — R79.89 ELEVATED SERUM CREATININE: ICD-10-CM

## 2017-11-06 DIAGNOSIS — R10.2 PELVIC CRAMPING: ICD-10-CM

## 2017-11-06 LAB
ALBUMIN UR-MCNC: NEGATIVE MG/DL
APPEARANCE UR: CLEAR
BILIRUB UR QL STRIP: NEGATIVE
COLOR UR AUTO: YELLOW
GLUCOSE UR STRIP-MCNC: NEGATIVE MG/DL
HGB UR QL STRIP: NEGATIVE
KETONES UR STRIP-MCNC: NEGATIVE MG/DL
LEUKOCYTE ESTERASE UR QL STRIP: NEGATIVE
NITRATE UR QL: NEGATIVE
PH UR STRIP: 7.5 PH (ref 5–7)
SOURCE: ABNORMAL
SP GR UR STRIP: 1.01 (ref 1–1.03)
SPECIMEN SOURCE: NORMAL
UROBILINOGEN UR STRIP-ACNC: 0.2 EU/DL (ref 0.2–1)
WET PREP SPEC: NORMAL

## 2017-11-06 PROCEDURE — 87210 SMEAR WET MOUNT SALINE/INK: CPT | Performed by: FAMILY MEDICINE

## 2017-11-06 PROCEDURE — 82043 UR ALBUMIN QUANTITATIVE: CPT | Performed by: FAMILY MEDICINE

## 2017-11-06 PROCEDURE — 81003 URINALYSIS AUTO W/O SCOPE: CPT | Performed by: FAMILY MEDICINE

## 2017-11-06 PROCEDURE — 36415 COLL VENOUS BLD VENIPUNCTURE: CPT | Performed by: FAMILY MEDICINE

## 2017-11-06 PROCEDURE — 87086 URINE CULTURE/COLONY COUNT: CPT | Performed by: FAMILY MEDICINE

## 2017-11-06 PROCEDURE — 99214 OFFICE O/P EST MOD 30 MIN: CPT | Performed by: FAMILY MEDICINE

## 2017-11-06 PROCEDURE — 80048 BASIC METABOLIC PNL TOTAL CA: CPT | Performed by: FAMILY MEDICINE

## 2017-11-06 RX ORDER — METRONIDAZOLE 7.5 MG/G
1 GEL VAGINAL 2 TIMES DAILY
Qty: 70 G | Refills: 0 | Status: SHIPPED | OUTPATIENT
Start: 2017-11-06 | End: 2017-11-11

## 2017-11-06 NOTE — MR AVS SNAPSHOT
After Visit Summary   11/6/2017    Bernie Croft    MRN: [id number]           Patient Information     Date Of Birth          1980        Visit Information        Provider Department      11/6/2017 9:20 AM Madhav Samson DO [facility]        Today's Diagnoses     Dysuria    -  1    Abnormal renal function test        Benign essential hypertension        Elevated serum creatinine        Pelvic cramping          Care Instructions    Please send a message to renal after labs  Lets treat for BV if postive on wet prep, I will send oral antibiotics  If neg will send cream  Awaiting urine culture  Madhav Samson D.O.    [facility]   Discharged by : Viry CONNER, Certified Medical Assistant (AAMA)November 6, 2017 10:17 AM    Paper scripts provided to patient : none     If you have any questions regarding your visit please contact your care team:     [facility] Clinic Hours Telephone Number   Dr. Viry Lantigua   7am-7pm Monday - Thursday   7am-5pm Fridays  [phone]   (Appointment scheduling available 24/7)   RN Line   [phone] option 2       For a Price Quote for your services, please call our Consumer Price Line at [phone].     What options do I have for visits at the clinic other than the traditional office visit?     To expand how we care for you, many of our providers are utilizing electronic visits (e-visits) and telephone visits, when medically appropriate, for interactions with their patients rather than a visit in the clinic. We also offer nurse visits for many medical concerns. Just like any other service, we will bill your insurance company for this type of visit based on time spent on the phone with your provider. Not all insurance companies cover these visits. Please check with your medical insurance if this type of visit is covered. You will be  responsible for any charges that are not paid by your insurance.   E-visits via [employer]: generally incur a $35.00 fee.     Telephone visits:   Time spent on the phone: *charged based on time that is spent on the phone in increments of 10 minutes. Estimated cost:   5-10 mins $30.00   11-20 mins. $59.00   21-30 mins. $85.00     Use [employer] (secure email communication and access to your chart) to send your primary care provider a message or make an appointment. Ask someone on your Team how to sign up for [employer].     As always, Thank you for trusting us with your health care needs!      [facility]:    Scheduling Appointments  [facility] Mil[facility]  Call: [phone]    Zana Klein [facility]  Call: [phone]    [facility]  Call: [phone]    For Gastroenterology referrals   [facility]   Clinics and Surgery Center, 4th Floor   [address] [place] [address]   Appointments: [phone]    WHERE TO GO FOR CARE?  Clinic    Make an appointment if you:       Are sick (cold, cough, flu, sore throat, earache or in pain).       Have a small injury (sprain, small cut, burn or broken bone).       Need a physical exam, Pap smear, vaccine or prescription refill.       Have questions about your health or medicines.    To reach us:      Call [phone] ([phone]). Open 24 hours every day. (For counseling services, call [phone].)    Log into [employer] at [email or web address]. (Visit [email or web address] to create an account.) Hospital emergency room    An emergency is a serious or life- threatening problem that must be treated right away.    Call 617 or get to the hospital if you have:      Very bad or sudden:            - Chest pain or pressure         - Bleeding         - Head or belly pain         - Dizziness or trouble seeing, walking or                          Speaking      Problems breathing      Blood in  your vomit or you are coughing up blood      A major injury (knocked out, loss of a finger or limb, rape, broken bone protruding from skin)    A mental health crisis. (Or call the Mental Health Crisis line at 1-812.483.3998 or Suicide Prevention Hotline at 1-471.877.5825.)    Open 24 hours every day. You don't need an appointment.     Urgent care    Visit urgent care for sickness or small injuries when the clinic is closed. You don't need an appointment. To check hours or find an urgent care near you, visit [email or web address]. Online care    Get online care from [employer] for more than 70 common problems, like colds, allergies and infections. Open 24 hours every day at:   [email or web address]   Need help deciding?    For advice about where to be seen, you may call your clinic and ask to speak with a nurse. We're here for you 24 hours every day.         If you are deaf or hard of hearing, please let us know. We provide many free services including sign language interpreters, oral interpreters, TTYs, telephone amplifiers, note takers and written materials.                   Follow-ups after your visit        Who to contact     If you have questions or need follow up information about today's clinic visit or your schedule please contact [facility] directly at [phone].  Normal or non-critical lab and imaging results will be communicated to you by [employer], letter or phone within 4 business days after the clinic has received the results. If you do not hear from us within 7 days, please contact the clinic through [employer] or phone. If you have a critical or abnormal lab result, we will notify you by phone as soon as possible.  Submit refill requests through [employer] or call your pharmacy and they will forward the refill request to us. Please allow 3 business days for your refill to be completed.          Additional Information About Your Visit        [employer] Information     [employer] gives you secure access to  "your electronic health record. If you see a primary care provider, you can also send messages to your care team and make appointments. If you have questions, please call your primary care clinic.  If you do not have a primary care provider, please call [phone] and they will assist you.        Care EveryWhere ID     This is your Care EveryWhere ID. This could be used by other organizations to access your [facility] medical records  [id number]        Your Vitals Were     Pulse Temperature Height BMI (Body Mass Index)          70 98.9  F (37.2  C) (Oral) 5' 5.5\" (1.664 m) 31.63 kg/m2         Blood Pressure from Last 3 Encounters:   11/06/17 126/74   10/31/17 136/86   09/06/17 124/88    Weight from Last 3 Encounters:   11/06/17 193 lb (87.5 kg)   10/31/17 193 lb (87.5 kg)   09/06/17 193 lb (87.5 kg)              We Performed the Following     *UA reflex to Microscopic     Albumin Random Urine Quantitative with Creat Ratio     Basic metabolic panel     Urine Culture Aerobic Bacterial     Wet prep        Primary Care Provider Office Phone # Fax #    Madhav Samson [phone]       [address] [facility] [address]        Equal Access to Services     KEVIN REID AH: Hadii rola ku hadasho Soomaali, waaxda luqadaha, qaybta kaalmada adeegyada, waxay idiin haywoodyn malinda florian lagege gonzalez. So [facility] [phone].    ATENCIÓN: Si habla español, tiene a hunter disposición servicios gratuitos de asistencia lingüística. Llame al [phone].    We comply with applicable federal civil rights laws and [place] laws. We do not discriminate on the basis of race, color, national origin, age, disability, sex, sexual orientation, or gender identity.            Thank you!     Thank you for choosing [facility]  for your care. Our goal is always to provide you with excellent care. Hearing back from our patients is one way we can continue to improve our services. Please take a few minutes to " complete the written survey that you may receive in the mail after your visit with us. Thank you!             Your Updated Medication List - Protect others around you: Learn how to safely use, store and throw away your medicines at www.disposemymeds.org.          This list is accurate as of: 11/6/17 10:17 AM.  Always use your most recent med list.                   Brand Name Dispense Instructions for use Diagnosis    B-12 1000 MCG Caps      Once daily        clindamycin-benzoyl Per (Refr) 1.2-5 % Gel    DUAC    45 g    Apply 0.5 inches topically At Bedtime    Acne vulgaris       hydrochlorothiazide 12.5 MG Tabs tablet     90 tablet    Take 1 tablet (12.5 mg) by mouth daily    Benign essential hypertension       MIRENA (52 MG) 20 MCG/24HR IUD   Generic drug:  levonorgestrel      IUD        multivitamin  peds with iron 60 MG chewable tablet      Take 1 chew tab by mouth daily.        PROBIOTIC ACIDOPHILUS PO           * spironolactone 25 MG tablet    ALDACTONE     Take 125 mg by mouth daily    Acne, unspecified acne type       * spironolactone 100 MG tablet    ALDACTONE    45 tablet    Take 1.5 tablets (150 mg) by mouth daily    Benign essential hypertension, Acne vulgaris       tretinoin 0.05 % Gel     45 g    Apply 0.5 inches topically At Bedtime Spread a pea size amount into affected area.  Use sunscreen SPF>20.    Acne vulgaris       vitamin D 2000 UNITS Caps      1 capsules daily        * Notice:  This list has 2 medication(s) that are the same as other medications prescribed for you. Read the directions carefully, and ask your doctor or other care provider to review them with you.

## 2017-11-06 NOTE — PROGRESS NOTES
"  SUBJECTIVE:   Bernie Croft is a 37 year old female who presents to clinic today for the following health issues:      URINARY TRACT SYMPTOMS      Duration: ongoing, treated 10 days ago with online care and symptoms did not improve    Description  When she has to go,. The feeling of \"has to go\" is different, burning in bladder area, discomfort. When she started meds it improved within 2-3 days and then did not continue imprver    Intensity:  moderate    Accompanying signs and symptoms:  Fever/chills: no   Flank pain no   Nausea and vomiting: no   Vaginal symptoms: none  Abdominal/Pelvic Pain: YES    History  History of frequent UTI's: YES  History of kidney stones: no   Sexually Active: no   Possibility of pregnancy: No    Precipitating or alleviating factors: None    Therapies tried and outcome: course of antibiotics - \"something with an N \"  Outcome:did not help      History of BV-previous symptoms similar, no risk for std    History of acne and htn, stopped all meds and doing well on her weight and bp's  She has been overloading with protien, no ibuprofen that is excessive, no bp meds for over a month and bp has been stable  Has seen nephrology and recent creat is elevated        Problem list and histories reviewed & adjusted, as indicated.  Additional history: as documented    Patient Active Problem List   Diagnosis     Acne     Hyperhidrosis of axilla     Obesity     CARDIOVASCULAR SCREENING; LDL GOAL LESS THAN 130     Melanocytic nevus     S/P Adjustable Band baraitric surgery      IUD (intrauterine device) in place     Abnormal mammogram     BMI 28.0-28.9,adult     Postsurgical nonabsorption     ASCUS of cervix with negative high risk HPV     Benign essential hypertension     Elevated serum creatinine     Past Surgical History:   Procedure Laterality Date     ENT SURGERY      surgery for displaced nose     GYN SURGERY           LAPAROSCOPIC GASTRIC ADJUSTABLE BANDING  2012    Procedure: " "LAPAROSCOPIC GASTRIC ADJUSTABLE BANDING;  LAPAROSCOPIC GASTRIC ADJUSTABLE BANDING ;  Surgeon: Sina Day MD;  Location: [facility] OR     MAMMOPLASTY REDUCTION BILATERAL         Social History   Substance Use Topics     Smoking status: Never Smoker     Smokeless tobacco: Never Used      Comment: no second hand smoke     Alcohol use Yes      Comment: rare about 0-1 drink per month     Family History   Problem Relation Age of Onset     Thyroid Disease Father      DIABETES Maternal Grandmother      Hypertension Maternal Grandmother      Breast Cancer Maternal Grandmother      late 60's     DIABETES Maternal Grandfather      Hypertension Maternal Grandfather      Thyroid Disease Paternal Grandmother      Thyroid Disease Sister      C.A.D. No family hx of      Cancer - colorectal No family hx of      CEREBROVASCULAR DISEASE No family hx of      Asthma No family hx of              Reviewed and updated as needed this visit by [facility]  M[facility]       Reviewed and updated as needed this visit by Provider         ROS:  Constitutional, HEENT, cardiovascular, pulmonary, GI, , musculoskeletal, neuro, skin, endocrine and psych systems are negative, except as otherwise noted.      OBJECTIVE:   /74  Pulse 70  Temp 98.9  F (37.2  C) (Oral)  Ht 5' 5.5\" (1.664 m)  Wt 193 lb (87.5 kg)  BMI 31.63 kg/m2  Body mass index is 31.63 kg/(m^2).  GENERAL: healthy, alert and no distress  RESP: lungs clear to auscultation - no rales, rhonchi or wheezes  CV: regular rate and rhythm, normal S1 S2, no S3 or S4, no murmur, click or rub, no peripheral edema and peripheral pulses strong  ABDOMEN: soft, nontender, no hepatosplenomegaly, no masses and bowel sounds normal  MS: no gross musculoskeletal defects noted, no edema    Diagnostic Test Results:  Results for orders placed or performed in visit on 11/06/17 (from the past 24 hour(s))   *UA reflex to Microscopic   Result Value Ref Range    Color Urine Yellow     Appearance " Urine Clear     Glucose Urine Negative NEG^Negative mg/dL    Bilirubin Urine Negative NEG^Negative    Ketones Urine Negative NEG^Negative mg/dL    Specific Gravity Urine 1.015 1.003 - 1.035    Blood Urine Negative NEG^Negative    pH Urine 7.5 (H) 5.0 - 7.0 pH    Protein Albumin Urine Negative NEG^Negative mg/dL    Urobilinogen Urine 0.2 0.2 - 1.0 EU/dL    Nitrite Urine Negative NEG^Negative    Leukocyte Esterase Urine Negative NEG^Negative    Source Midstream Urine    Wet prep   Result Value Ref Range    Specimen Description Vagina     Wet Prep No clue cells seen     Wet Prep No Trichomonas seen     Wet Prep No yeast seen        ASSESSMENT/PLAN:       ICD-10-CM    1. Dysuria R30.0 *UA reflex to Microscopic     Urine Culture Aerobic Bacterial   2. Abnormal renal function test R94.4 Basic metabolic panel     Albumin Random Urine Quantitative with Creat Ratio     Wet prep   3. Benign essential hypertension I10    4. Elevated serum creatinine R79.89    5. Pelvic cramping R10.2 Wet prep     metroNIDAZOLE (METROGEL) 0.75 % vaginal gel     UTI sx-recent treatment without UA, today's UA is neg, advised culure  Pelvic cramping and urinary tract infection sx may be BV, declined pelvic exam, no history of STI but has had history of BV-treat with cream, wet prep is neg today  hypertension-stable off meds  Elevated creatinine history -recent labs worsening, will recheck today, follow up with nephrology, much better today    See Patient Instructions    Madhav Samson DO  [facility]

## 2017-11-06 NOTE — NURSING NOTE
"Chief Complaint   Patient presents with     UTI       Initial /74  Pulse 70  Temp 98.9  F (37.2  C) (Oral)  Ht 5' 5.5\" (1.664 m)  Wt 193 lb (87.5 kg)  BMI 31.63 kg/m2 Estimated body mass index is 31.63 kg/(m^2) as calculated from the following:    Height as of this encounter: 5' 5.5\" (1.664 m).    Weight as of this encounter: 193 lb (87.5 kg).  Medication Reconciliation: complete   Charity Goss MA      "

## 2017-11-06 NOTE — PATIENT INSTRUCTIONS
Please send a message to renal after labs  Lets treat for BV if postive on wet prep, I will send oral antibiotics  If neg will send cream  Awaiting urine culture  Madhav Samson D.O.    [facility]   Discharged by : Viry CONNER, Certified Medical Assistant (AAMA)November 6, 2017 10:17 AM    Paper scripts provided to patient : none     If you have any questions regarding your visit please contact your care team:     [facility] Clinic Hours Telephone Number   Dr. Viry Lantigua   7am-7pm Monday - Thursday   7am-5pm Fridays  [phone]   (Appointment scheduling available 24/7)   RN Line   [phone] option 2       For a Price Quote for your services, please call our Consumer Price Line at [phone].     What options do I have for visits at the clinic other than the traditional office visit?     To expand how we care for you, many of our providers are utilizing electronic visits (e-visits) and telephone visits, when medically appropriate, for interactions with their patients rather than a visit in the clinic. We also offer nurse visits for many medical concerns. Just like any other service, we will bill your insurance company for this type of visit based on time spent on the phone with your provider. Not all insurance companies cover these visits. Please check with your medical insurance if this type of visit is covered. You will be responsible for any charges that are not paid by your insurance.   E-visits via tsumobi: generally incur a $35.00 fee.     Telephone visits:   Time spent on the phone: *charged based on time that is spent on the phone in increments of 10 minutes. Estimated cost:   5-10 mins $30.00   11-20 mins. $59.00   21-30 mins. $85.00     Use tsumobi (secure email communication and access to your chart) to send your primary care provider a message or make an appointment. Ask someone on your Team how  to sign up for Programeter.     As always, Thank you for trusting us with your health care needs!      [facility]:    Scheduling Appointments  [place]  Call: [phone]    [facility]  Call: [phone]    [facility]  Call: [phone]    For Gastroenterology referrals   [facility]enter[facility]   Clinics and Surgery Center, 4th Floor   [address] [place] [address]   Appointments: [phone]    WHERE TO GO FOR CARE?  Clinic    Make an appointment if you:       Are sick (cold, cough, flu, sore throat, earache or in pain).       Have a small injury (sprain, small cut, burn or broken bone).       Need a physical exam, Pap smear, vaccine or prescription refill.       Have questions about your health or medicines.    To reach us:      Call [phone] ([phone]). Open 24 hours every day. (For counseling services, call [phone].)    Log into Programeter at [email or web address] [email or web address]. (Visit [email or web address] to create an account.) Hospital emergency room    An emergency is a serious or life- threatening problem that must be treated right away.    Call [phone] or get to the hospital if you have:      Very bad or sudden:            - Chest pain or pressure         - Bleeding         - Head or belly pain         - Dizziness or trouble seeing, walking or                          Speaking      Problems breathing      Blood in your vomit or you are coughing up blood      A major injury (knocked out, loss of a finger or limb, rape, broken bone protruding from skin)    A mental health crisis. (Or call the Mental Health Crisis line at 1-221.616.2868 or Suicide Prevention Hotline at 1-953.328.5849.)    Open 24 hours every day. You don't need an appointment.     Urgent care    Visit urgent care for sickness or small injuries when the clinic is closed. You don't need an appointment. To check hours or find an urgent  care near you, visit [email or web address]. Online care    Get online care from [facility] for more than 70 common problems, like colds, allergies and infections. Open 24 hours every day at:   [email or web address]   Need help deciding?    For advice about where to be seen, you may call your clinic and ask to speak with a nurse. We're here for you 24 hours every day.         If you are deaf or hard of hearing, please let us know. We provide many free services including sign language interpreters, oral interpreters, TTYs, telephone amplifiers, note takers and written materials.

## 2017-11-06 NOTE — LETTER
[address] [id number] [phone]                                                                                                November 22, 2017    Bernie Croft  [address]  [place] [address]        Dear Ms. Croft,    Your recent lab results were within normal limits.      You may contact the clinic at [phone] if you have any questions or concerns about this request.      Sincerely,      Madhav Samson DO/hl    Results for orders placed or performed in visit on 11/06/17   *UA reflex to Microscopic   Result Value Ref Range    Color Urine Yellow     Appearance Urine Clear     Glucose Urine Negative NEG^Negative mg/dL    Bilirubin Urine Negative NEG^Negative    Ketones Urine Negative NEG^Negative mg/dL    Specific Gravity Urine 1.015 1.003 - 1.035    Blood Urine Negative NEG^Negative    pH Urine 7.5 (H) 5.0 - 7.0 pH    Protein Albumin Urine Negative NEG^Negative mg/dL    Urobilinogen Urine 0.2 0.2 - 1.0 EU/dL    Nitrite Urine Negative NEG^Negative    Leukocyte Esterase Urine Negative NEG^Negative    Source Midstream Urine    Basic metabolic panel   Result Value Ref Range    Sodium 140 133 - 144 mmol/L    Potassium 4.0 3.4 - 5.3 mmol/L    Chloride 106 94 - 109 mmol/L    Carbon Dioxide 28 20 - 32 mmol/L    Anion Gap 6 3 - 14 mmol/L    Glucose 82 70 - 99 mg/dL    Urea Nitrogen 14 7 - 30 mg/dL    Creatinine 0.92 0.52 - 1.04 mg/dL    GFR Estimate 69 >60 mL/min/1.7m2    GFR Estimate If Black 84 >60 mL/min/1.7m2    Calcium 9.0 8.5 - 10.1 mg/dL   Albumin Random Urine Quantitative with Creat Ratio   Result Value Ref Range    Creatinine Urine 55 mg/dL    Albumin Urine mg/L 6 mg/L    Albumin Urine mg/g Cr 10.88 0 - 25 mg/g Cr   Wet prep   Result Value Ref Range    Specimen Description Vagina     Wet Prep No clue cells seen     Wet Prep No Trichomonas seen     Wet Prep No yeast seen    Urine Culture Aerobic Bacterial   Result Value Ref Range     Specimen Description Midstream Urine     Culture Micro <10,000 colonies/mL  mixed urogenital stuart

## 2017-11-07 LAB
ANION GAP SERPL CALCULATED.3IONS-SCNC: 6 MMOL/L (ref 3–14)
BACTERIA SPEC CULT: NORMAL
BUN SERPL-MCNC: 14 MG/DL (ref 7–30)
CALCIUM SERPL-MCNC: 9 MG/DL (ref 8.5–10.1)
CHLORIDE SERPL-SCNC: 106 MMOL/L (ref 94–109)
CO2 SERPL-SCNC: 28 MMOL/L (ref 20–32)
CREAT SERPL-MCNC: 0.92 MG/DL (ref 0.52–1.04)
CREAT UR-MCNC: 55 MG/DL
GFR SERPL CREATININE-BSD FRML MDRD: 69 ML/MIN/1.7M2
GLUCOSE SERPL-MCNC: 82 MG/DL (ref 70–99)
MICROALBUMIN UR-MCNC: 6 MG/L
MICROALBUMIN/CREAT UR: 10.88 MG/G CR (ref 0–25)
POTASSIUM SERPL-SCNC: 4 MMOL/L (ref 3.4–5.3)
SODIUM SERPL-SCNC: 140 MMOL/L (ref 133–144)
SPECIMEN SOURCE: NORMAL

## 2018-02-18 ENCOUNTER — HEALTH MAINTENANCE LETTER (OUTPATIENT)
Age: 38
End: 2018-02-18

## 2018-03-02 ENCOUNTER — VIRTUAL VISIT (OUTPATIENT)
Dept: FAMILY MEDICINE | Facility: OTHER | Age: 38
End: 2018-03-02

## 2018-03-02 NOTE — PROGRESS NOTES
"Date:   Clinician: Mike May  Clinician NPI: [id number]  Patient: Bernie Croft  Patient : 1980  Patient Address: [address] [place] [address] [address], [place] [id number]  Patient Phone: [phone]  Visit Protocol: UTI  Patient Summary:  Bernie is a 37 year old ( : 1980 ) female who initiated a Visit for a presumed bladder infection. When asked the question \"Please sign me up to receive news, health information and promotions. \", Bernie responded \"No\".    Her symptoms began 2 days ago and consist of urgency, chills, foul smelling urine, urinary frequency, nausea, and dysuria. Bernie feels feverish but was unable to measure her temperature.   Symptom Details   Urinary Frequency: Every hour    She denies hesitation, loss of appetite, vaginal discharge, flank pain, abdominal pain, vomiting, hematuria, and urinary incontinence. Bernie has never had kidney stones. She has not been hospitalized, been a patient in a nursing home, or had a catheter in the past two weeks. She denies risk factors for sexually transmitted infections.   Bernie has had one (1) UTI in the past 12 months. Her most recent bladder infection was not within the last 4 weeks. Her current symptoms are similar to the previous UTI symptoms. She took an antibiotic for her last infection but does not remember which one.   She has experienced side effects (upset stomach, vomiting, or diarrhea) from taking Bactrim DS (trimethoprim/sulfamethoxazole).  Bernie typically gets yeast infections when she takes antibiotics.  She denies pregnancy and denies breastfeeding. She does not menstruate.   She does NOT smoke or use smokeless tobacco.  MEDICATIONS:  No current medications   , ALLERGIES:   sulfa (Bactrim/Septra)    Clinician Response:  Dear Bernie,  Based on the information you have provided, you likely have a bladder infection, also called an acute urinary tract infection (UTI).   To treat your infection, I am prescribing:   Nitrofurantoin " (Macrobid). Swallow one (1) tablet twice a day for 5 days. Take the tablet with food. Continue taking the tablets even if you feel better before all the medication is gone. There is no refill with this prescription.   Some people develop allergies to antibiotics. If you notice a new rash, significant swelling, or difficulty breathing, stop the medication immediately and go into a clinic for physical evaluation.   To help treat your current UTI and prevent future occurrences, remember to:     Drink 8-10, 8-ounce glasses of water daily.    Urinate after sexual intercourse.    Wipe front to back after using the bathroom.     Some women may develop a yeast infection as a side effect of taking antibiotics. If you notice symptoms of a yeast infection, [facility] can help treat that condition as well. Simply log in and complete another Visit, which will cover all of the necessary questions to determine the best treatment for you.   You should visit a clinic for a follow-up visit if your symptoms do not improve in 1-2 days or if you experience another urinary tract infection soon after completing this treatment.  If you become pregnant during this course of treatment, stop taking the medication and contact your primary care provider.   Diagnosis: Acute Uncomplicated Bladder Infection  Diagnosis ICD: N39.0  Prescription: nitrofurantoin (Macrobid) 100mg oral tablet 10 tablets, 5 days supply. Take one tablet by mouth two times a day for 5 days. Refills: 0, Refill as needed: no, Allow substitutions: yes  Pharmacy: [id number] - [phone] - [address] [place] [address]

## 2018-03-28 ENCOUNTER — OFFICE VISIT (OUTPATIENT)
Dept: FAMILY MEDICINE | Facility: CLINIC | Age: 38
End: 2018-03-28
Payer: COMMERCIAL

## 2018-03-28 VITALS
WEIGHT: 192 LBS | DIASTOLIC BLOOD PRESSURE: 76 MMHG | TEMPERATURE: 99.2 F | HEART RATE: 78 BPM | BODY MASS INDEX: 30.86 KG/M2 | SYSTOLIC BLOOD PRESSURE: 128 MMHG | HEIGHT: 66 IN

## 2018-03-28 DIAGNOSIS — Z12.31 VISIT FOR SCREENING MAMMOGRAM: ICD-10-CM

## 2018-03-28 DIAGNOSIS — R31.9 URINARY TRACT INFECTION WITH HEMATURIA, SITE UNSPECIFIED: Primary | ICD-10-CM

## 2018-03-28 DIAGNOSIS — R30.0 DYSURIA: ICD-10-CM

## 2018-03-28 DIAGNOSIS — B96.89 BV (BACTERIAL VAGINOSIS): ICD-10-CM

## 2018-03-28 DIAGNOSIS — N89.8 VAGINAL ITCHING: ICD-10-CM

## 2018-03-28 DIAGNOSIS — B37.31 YEAST INFECTION OF THE VAGINA: ICD-10-CM

## 2018-03-28 DIAGNOSIS — N76.0 BV (BACTERIAL VAGINOSIS): ICD-10-CM

## 2018-03-28 DIAGNOSIS — N39.0 URINARY TRACT INFECTION WITH HEMATURIA, SITE UNSPECIFIED: Primary | ICD-10-CM

## 2018-03-28 LAB
ALBUMIN UR-MCNC: NEGATIVE MG/DL
APPEARANCE UR: CLEAR
BACTERIA #/AREA URNS HPF: ABNORMAL /HPF
BILIRUB UR QL STRIP: NEGATIVE
COLOR UR AUTO: YELLOW
GLUCOSE UR STRIP-MCNC: NEGATIVE MG/DL
HGB UR QL STRIP: ABNORMAL
KETONES UR STRIP-MCNC: NEGATIVE MG/DL
LEUKOCYTE ESTERASE UR QL STRIP: NEGATIVE
MUCOUS THREADS #/AREA URNS LPF: PRESENT /LPF
NITRATE UR QL: NEGATIVE
NON-SQ EPI CELLS #/AREA URNS LPF: ABNORMAL /LPF
PH UR STRIP: 5.5 PH (ref 5–7)
RBC #/AREA URNS AUTO: ABNORMAL /HPF
SOURCE: ABNORMAL
SP GR UR STRIP: 1.02 (ref 1–1.03)
SPECIMEN SOURCE: ABNORMAL
UROBILINOGEN UR STRIP-ACNC: 0.2 EU/DL (ref 0.2–1)
WBC #/AREA URNS AUTO: ABNORMAL /HPF
WET PREP SPEC: ABNORMAL
WET PREP SPEC: ABNORMAL

## 2018-03-28 PROCEDURE — 87210 SMEAR WET MOUNT SALINE/INK: CPT | Performed by: FAMILY MEDICINE

## 2018-03-28 PROCEDURE — 87086 URINE CULTURE/COLONY COUNT: CPT | Performed by: FAMILY MEDICINE

## 2018-03-28 PROCEDURE — 81001 URINALYSIS AUTO W/SCOPE: CPT | Performed by: FAMILY MEDICINE

## 2018-03-28 PROCEDURE — 99214 OFFICE O/P EST MOD 30 MIN: CPT | Performed by: FAMILY MEDICINE

## 2018-03-28 RX ORDER — FLUCONAZOLE 150 MG/1
150 TABLET ORAL ONCE
Qty: 2 TABLET | Refills: 1 | Status: SHIPPED | OUTPATIENT
Start: 2018-03-28 | End: 2018-03-28

## 2018-03-28 RX ORDER — CLINDAMYCIN PHOSPHATE 20 MG/G
CREAM VAGINAL
Qty: 40 G | Refills: 2 | Status: SHIPPED | OUTPATIENT
Start: 2018-03-28 | End: 2018-05-23

## 2018-03-28 RX ORDER — CIPROFLOXACIN 250 MG/1
250 TABLET, FILM COATED ORAL 2 TIMES DAILY
Qty: 6 TABLET | Refills: 0 | Status: SHIPPED | OUTPATIENT
Start: 2018-03-28 | End: 2018-07-06

## 2018-03-28 NOTE — PATIENT INSTRUCTIONS
Use cipro antibiotics orally and at the same time use clinda vaginally for 7 days  Then continue clinda cream once weekly 5-6 weeks  Use diflucan after oral antibiotics   If itching persists then repeat in 3 days  Gave a refill for diflucan if having symptoms after all antibiotics creams are done  Mammogram to be done  Physical in may  Madhav Samson D.O.      [facility]   Discharged by : Aisha JAIN CMA ([facility])    Paper scripts provided to patient : none      If you have any questions regarding your visit please contact your care team:     Team [facility]                Clinic Hours Telephone Number     Dr. Viry Merchant, NP 7am-7pm  Monday - Thursday   7am-5pm  Fridays  [phone]   (Appointment scheduling available 24/7)     RN Line  [phone] option 2     Urgent Care - [place] - 11am-9pm Monday-Friday Saturday-Sunday- 9am-5pm     [place] -   5pm-9pm Monday-Friday Saturday-Sunday- 9am-5pm    [phone] - [place]    [phone] - [place]       For a Price Quote for your services, please call our Consumer Price Line at [phone].     What options do I have for visits at the clinic other than the traditional office visit?     To expand how we care for you, many of our providers are utilizing electronic visits (e-visits) and telephone visits, when medically appropriate, for interactions with their patients rather than a visit in the clinic. We also offer nurse visits for many medical concerns. Just like any other service, we will bill your insurance company for this type of visit based on time spent on the phone with your provider. Not all insurance companies cover these visits. Please check with your medical insurance if this type of visit is covered. You will be responsible for any charges that are not paid by your insurance.   E-visits via Fast Orientation: generally incur a  $35.00 fee.     Telephone visits:   Time spent on the phone: *charged based on time that is spent on the phone in increments of 10 minutes. Estimated cost:   5-10 mins $30.00   11-20 mins. $59.00   21-30 mins. $85.00     Use 10X Technologiest (secure email communication and access to your chart) to send your primary care provider a message or make an appointment. Ask someone on your Team how to sign up for 10X Technologiest.     As always, Thank you for trusting us with your health care needs!      [facility]:    Scheduling Appointments  [facility]  Call: [phone]    [facility] [place]  Call: [phone]    [facility]  Call: [phone]    For Gastroenterology referrals   [facility] Gastroenterology   Clinics and Surgery Center, 4th Floor   [address] [place] [address]   Appointments: [phone]    WHERE TO GO FOR CARE?  Clinic    Make an appointment if you:       Are sick (cold, cough, flu, sore throat, earache or in pain).       Have a small injury (sprain, small cut, burn or broken bone).       Need a physical exam, Pap smear, vaccine or prescription refill.       Have questions about your health or medicines.    To reach us:      Call [phone] ([phone]). Open 24 hours every day. (For counseling services, call [phone].)    Log into Cadee at [email or web address]. (Visit [email or web address] to create an account.) Hospital emergency room    An emergency is a serious or life- threatening problem that must be treated right away.    Call 055 or get to the hospital if you have:      Very bad or sudden:            - Chest pain or pressure         - Bleeding         - Head or belly pain         - Dizziness or trouble seeing, walking or                          Speaking      Problems breathing      Blood in your vomit or you are coughing up blood      A major injury (knocked out, loss of a finger or limb, rape,  broken bone protruding from skin)    A mental health crisis. (Or call the Mental Health Crisis line at 1-376.478.8180 or Suicide Prevention Hotline at 1-106.763.5994.)    Open 24 hours every day. You don't need an appointment.     Urgent care    Visit urgent care for sickness or small injuries when the clinic is closed. You don't need an appointment. To check hours or find an urgent care near you, visit [email or web address]. Online care    Get online care from [facility] for more than 70 common problems, like colds, allergies and infections. Open 24 hours every day at:   [email or web address]   Need help deciding?    For advice about where to be seen, you may call your clinic and ask to speak with a nurse. We're here for you 24 hours every day.         If you are deaf or hard of hearing, please let us know. We provide many free services including sign language interpreters, oral interpreters, TTYs, telephone amplifiers, note takers and written materials.

## 2018-03-28 NOTE — PROGRESS NOTES
SUBJECTIVE:   Bernie Croft is a 37 year old female who presents to clinic today for the following health issues:    Patient reports she may also be dealing with BV, Left a self collect wet prep along with urine.     URINARY TRACT SYMPTOMS      Duration: one month ago, did [facility] for severe urinary symptoms and got medications prior to [place] of [place], She finished Macrobid 5 day supply and felt improvement but after about one week Symptoms returned again    Description  dysuria, frequency, urgency, back pain and follow-up UTI from her [facility] visit earlier this month    Intensity:  moderate    Accompanying signs and symptoms:  Fever/chills: YES  Flank pain YES  Nausea and vomiting: no   Vaginal symptoms: she is worried about possible BV also, recent use of Macrobid  Abdominal/Pelvic Pain: YES    History  History of frequent UTI's: YES  History of kidney stones: no, but right kidney is smaller than left  Sexually Active: YES  Possibility of pregnancy: No    Precipitating or alleviating factors: None    Therapies tried and outcome: Macrobid 5 day, finished and improved for about one week and then symptoms returned     Frequent BV, yeast and UTI    No abdominal pain, no chest pain, no CVA tenderness        Problem list and histories reviewed & adjusted, as indicated.  Additional history: as documented    Patient Active Problem List   Diagnosis     Acne     Hyperhidrosis of axilla     Obesity     CARDIOVASCULAR SCREENING; LDL GOAL LESS THAN 130     Melanocytic nevus     S/P Adjustable Band baraitric surgery      IUD (intrauterine device) in place     Abnormal mammogram     BMI 28.0-28.9,adult     Postsurgical nonabsorption     ASCUS of cervix with negative high risk HPV     Benign essential hypertension     Elevated serum creatinine     Past Surgical History:   Procedure Laterality Date     ENT SURGERY      surgery for displaced nose     GYN SURGERY           LAPAROSCOPIC GASTRIC ADJUSTABLE BANDING   "7/5/2012    Procedure: LAPAROSCOPIC GASTRIC ADJUSTABLE BANDING;  LAPAROSCOPIC GASTRIC ADJUSTABLE BANDING ;  Surgeon: Sina Day MD;  Location: [facility] OR     MAMMOPLASTY REDUCTION BILATERAL         Social History   Substance Use Topics     Smoking status: Never Smoker     Smokeless tobacco: Never Used      Comment: no second hand smoke     Alcohol use Yes      Comment: rare about 0-1 drink per month     Family History   Problem Relation Age of Onset     Thyroid Disease Father      DIABETES Maternal Grandmother      Hypertension Maternal Grandmother      Breast Cancer Maternal Grandmother      late 60's     DIABETES Maternal Grandfather      Hypertension Maternal Grandfather      Thyroid Disease Paternal Grandmother      Thyroid Disease Sister      C.A.D. No family hx of      Cancer - colorectal No family hx of      CEREBROVASCULAR DISEASE No family hx of      Asthma No family hx of            Reviewed and updated as needed this visit by clinical staff  Tobacco  Allergies  Med Hx  Surg Hx  Fam Hx  Soc Hx      Reviewed and updated as needed this visit by Provider         ROS:  Constitutional, HEENT, cardiovascular, pulmonary, GI, , musculoskeletal, neuro, skin, endocrine and psych systems are negative, except as otherwise noted.    OBJECTIVE:     /76  Pulse 78  Temp 99.2  F (37.3  C) (Oral)  Ht 5' 5.5\" (1.664 m)  Wt 192 lb (87.1 kg)  BMI 31.46 kg/m2  Body mass index is 31.46 kg/(m^2).  GENERAL: healthy, alert and no distress  RESP: lungs clear to auscultation - no rales, rhonchi or wheezes  CV: regular rate and rhythm, normal S1 S2, no S3 or S4, no murmur, click or rub, no peripheral edema and peripheral pulses strong  ABDOMEN: soft, nontender, no hepatosplenomegaly, no masses and bowel sounds normal  MS: no gross musculoskeletal defects noted, no edema    Diagnostic Test Results:  Results for orders placed or performed in visit on 03/28/18 (from the past 24 hour(s))   *UA reflex to Microscopic "   Result Value Ref Range    Color Urine Yellow     Appearance Urine Clear     Glucose Urine Negative NEG^Negative mg/dL    Bilirubin Urine Negative NEG^Negative    Ketones Urine Negative NEG^Negative mg/dL    Specific Gravity Urine 1.020 1.003 - 1.035    Blood Urine Small (A) NEG^Negative    pH Urine 5.5 5.0 - 7.0 pH    Protein Albumin Urine Negative NEG^Negative mg/dL    Urobilinogen Urine 0.2 0.2 - 1.0 EU/dL    Nitrite Urine Negative NEG^Negative    Leukocyte Esterase Urine Negative NEG^Negative    Source Midstream Urine    Urine Microscopic   Result Value Ref Range    WBC Urine 0 - 5 OTO5^0 - 5 /HPF    RBC Urine O - 2 OTO2^O - 2 /HPF    Squamous Epithelial /LPF Urine Few FEW^Few /LPF    Bacteria Urine Few (A) NEG^Negative /HPF    Mucous Urine Present (A) NEG^Negative /LPF   Wet prep   Result Value Ref Range    Specimen Description Vagina     Wet Prep No Trichomonas seen     Wet Prep Clue cells seen  Yeast seen   (A)        ASSESSMENT/PLAN:       ICD-10-CM    1. Urinary tract infection with hematuria, site unspecified N39.0 ciprofloxacin (CIPRO) 250 MG tablet    R31.9    2. BV (bacterial vaginosis) N76.0 clindamycin (CLEOCIN) 2 % cream    B96.89    3. Yeast infection of the vagina B37.3 fluconazole (DIFLUCAN) 150 MG tablet   4. Dysuria R30.0 *UA reflex to Microscopic     Urine Microscopic     Urine Culture Aerobic Bacterial     ciprofloxacin (CIPRO) 250 MG tablet   5. Visit for screening mammogram Z12.31 MA SCREENING DIGITAL BILAT - Future  (s+30)   6. Vaginal itching L29.8 Wet prep     UTI- patient with symptoms now, UA not bad, due to symptoms will treat with cipro, awaiting culture  BV-recurrent-treat -prolonged weekly cream    Use cipro antibiotics orally and at the same time use clinda vaginally for 7 days  Then continue clinda cream once weekly 5-6 weeks  Use diflucan after oral antibiotics   If itching persists then repeat in 3 days  Gave a refill for diflucan if having symptoms after all antibiotics creams  are done  Mammogram to be done  Physical in may  See Patient Instructions    Madhav Samson,   [facility]

## 2018-03-28 NOTE — MR AVS SNAPSHOT
After Visit Summary   3/28/2018    Bernie Croft    MRN: [id number]           Patient Information     Date Of Birth          1980        Visit Information        Provider Department      3/28/2018 11:40 AM Madhav Samson DO [facility]        Today's Diagnoses     Dysuria    -  1    Visit for screening mammogram        Vaginal itching        BV (bacterial vaginosis)        Urinary tract infection with hematuria, site unspecified        Yeast infection of the vagina          Care Instructions    Use cipro antibiotics orally and at the same time use clinda vaginally for 7 days  Then continue clinda cream once weekly 5-6 weeks  Use diflucan after oral antibiotics   If itching persists then repeat in 3 days  Gave a refill for diflucan if having symptoms after all antibiotics creams are done  Mammogram to be done  Physical in may  Madhav Samson D.O.      [facility]   Discharged by : Aisha JAIN CMA ([facility])    Paper scripts provided to patient : none      If you have any questions regarding your visit please contact your care team:     Team Gold                Clinic Hours Telephone Number     Dr. Viry Merchant, NP 7am-7pm  Monday - Thursday   7am-5pm  Fridays  [phone]   (Appointment scheduling available 24/7)     RN Line  [phone] option 2     Urgent Care - [place] and [facility] - 11am-9pm Monday-Friday Saturday-Sunday- 9am-5pm     [place] -   5pm-9pm Monday-Friday Saturday-Sunday- 9am-5pm    [phone] - [place]    [phone] - [place]       For a Price Quote for your services, please call our Consumer Price Line at [phone].     What options do I have for visits at the clinic other than the traditional office visit?     To expand how we care for you, many of our providers are utilizing electronic visits (e-visits) and telephone  visits, when medically appropriate, for interactions with their patients rather than a visit in the clinic. We also offer nurse visits for many medical concerns. Just like any other service, we will bill your insurance company for this type of visit based on time spent on the phone with your provider. Not all insurance companies cover these visits. Please check with your medical insurance if this type of visit is covered. You will be responsible for any charges that are not paid by your insurance.   E-visits via [email or web address]: generally incur a $35.00 fee.     Telephone visits:   Time spent on the phone: *charged based on time that is spent on the phone in increments of 10 minutes. Estimated cost:   5-10 mins $30.00   11-20 mins. $59.00   21-30 mins. $85.00     Use [email or web address] (secure email communication and access to your chart) to send your primary care provider a message or make an appointment. Ask someone on your Team how to sign up for [email or web address].     As always, Thank you for trusting us with your health care needs!      [facility]:    Scheduling Appointments  [place]  Call: [phone]    [place] [facility]  Call: [phone]    [facility]  Call: [phone]    For Gastroenterology referrals   [facility]   Clinics and Surgery Center, 4th Floor   [address] [place] [address]   Appointments: [phone]    WHERE TO GO FOR CARE?  Clinic    Make an appointment if you:       Are sick (cold, cough, flu, sore throat, earache or in pain).       Have a small injury (sprain, small cut, burn or broken bone).       Need a physical exam, Pap smear, vaccine or prescription refill.       Have questions about your health or medicines.    To reach us:      Call [phone] ([phone]). Open 24 hours every day. (For counseling services, call [phone].)    Log into [email or web address] at [email or web address]. (Visit  [email or web address] to create an account.) Hospital emergency room    An emergency is a serious or life- threatening problem that must be treated right away.    Call 911 or get to the hospital if you have:      Very bad or sudden:            - Chest pain or pressure         - Bleeding         - Head or belly pain         - Dizziness or trouble seeing, walking or                          Speaking      Problems breathing      Blood in your vomit or you are coughing up blood      A major injury (knocked out, loss of a finger or limb, rape, broken bone protruding from skin)    A mental health crisis. (Or call the Mental Health Crisis line at 1-609.949.4102 or Suicide Prevention Hotline at 1-929.910.9583.)    Open 24 hours every day. You don't need an appointment.     Urgent care    Visit urgent care for sickness or small injuries when the clinic is closed. You don't need an appointment. To check hours or find an urgent care near you, visit [email or web address]. Online care    Get online care from [facility] for more than 70 common problems, like colds, allergies and infections. Open 24 hours every day at:   [email or web address]   Need help deciding?    For advice about where to be seen, you may call your clinic and ask to speak with a nurse. We're here for you 24 hours every day.         If you are deaf or hard of hearing, please let us know. We provide many free services including sign language interpreters, oral interpreters, TTYs, telephone amplifiers, note takers and written materials.                   Follow-ups after your visit        Future tests that were ordered for you today     Open Future Orders        Priority Expected Expires Ordered    MA SCREENING DIGITAL BILAT - Future  (s+30) Routine  3/28/2019 3/28/2018            Who to contact     If you have questions or need follow up information about today's clinic visit or your schedule please contact [facility] directly at [phone].  Normal or  "non-critical lab and imaging results will be communicated to you by MyChart, letter or phone within 4 business days after the clinic has received the results. If you do not hear from us within 7 days, please contact the clinic through Radico or phone. If you have a critical or abnormal lab result, we will notify you by phone as soon as possible.  Submit refill requests through Radico or call your pharmacy and they will forward the refill request to us. Please allow 3 business days for your refill to be completed.          Additional Information About Your Visit        Radico Information     Radico gives you secure access to your electronic health record. If you see a primary care provider, you can also send messages to your care team and make appointments. If you have questions, please call your primary care clinic.  If you do not have a primary care provider, please call [phone] and they will assist you.        Care EveryWhere ID     This is your Care EveryWhere ID. This could be used by other organizations to access your [place] medical records  [id number]        Your Vitals Were     Pulse Temperature Height BMI (Body Mass Index)          78 99.2  F (37.3  C) (Oral) 5' 5.5\" (1.664 m) 31.46 kg/m2         Blood Pressure from Last 3 Encounters:   03/28/18 128/76   11/06/17 126/74   10/31/17 136/86    Weight from Last 3 Encounters:   03/28/18 192 lb (87.1 kg)   11/06/17 193 lb (87.5 kg)   10/31/17 193 lb (87.5 kg)              We Performed the Following     *UA reflex to Microscopic     Urine Culture Aerobic Bacterial     Urine Microscopic     Wet prep          Today's Medication Changes          These changes are accurate as of 3/28/18 12:36 PM.  If you have any questions, ask your nurse or doctor.               Start taking these medicines.        Dose/Directions    ciprofloxacin 250 MG tablet   Commonly known as:  CIPRO   Used for:  Dysuria, Urinary tract infection with hematuria, site unspecified "   Started by:  Madhav Samson DO        Dose:  250 mg   Take 1 tablet (250 mg) by mouth 2 times daily   Quantity:  6 tablet   Refills:  0       clindamycin 2 % cream   Commonly known as:  CLEOCIN   Used for:  BV (bacterial vaginosis)   Started by:  Madhav Samson DO        After 7 days, use weekly 5-6 weeks   Quantity:  40 g   Refills:  2       fluconazole 150 MG tablet   Commonly known as:  DIFLUCAN   Used for:  Yeast infection of the vagina   Started by:  Madhav Samson DO        Dose:  150 mg   Take 1 tablet (150 mg) by mouth once for 1 dose   Quantity:  2 tablet   Refills:  1            Where to get your medicines      These medications were sent to [place] [address] [facility] [place] - [address]  [address] [place] [address]     Phone:  [phone]     ciprofloxacin 250 MG tablet    clindamycin 2 % cream    fluconazole 150 MG tablet                Primary Care Provider Office Phone # Fax #    Madhav Samson [phone]       [address] [facility] [address]        Equal Access to Services     KEVIN REID : Hadii rola ku hadasho Soomaali, waaxda luqadaha, qaybta kaalmada adeegyada, brittany dior haywoodyn malinda gonzalez. So [facility] [phone].    ATENCIÓN: Si habla español, tiene a hunter disposición servicios gratuitos de asistencia lingüística. Llame al [phone].    We comply with applicable federal civil rights laws and [place] laws. We do not discriminate on the basis of race, color, national origin, age, disability, sex, sexual orientation, or gender identity.            Thank you!     Thank you for choosing [facility]  for your care. Our goal is always to provide you with excellent care. Hearing back from our patients is one way we can continue to improve our services. Please take a few minutes to complete the written survey that you may receive in the mail after your visit with us. Thank you!              Your Updated Medication List - Protect others around you: Learn how to safely use, store and throw away your medicines at www.disposemymeds.org.          This list is accurate as of 3/28/18 12:36 PM.  Always use your most recent med list.                   Brand Name Dispense Instructions for use Diagnosis    B-12 1000 MCG Caps      as needed Once daily        ciprofloxacin 250 MG tablet    CIPRO    6 tablet    Take 1 tablet (250 mg) by mouth 2 times daily    Dysuria, Urinary tract infection with hematuria, site unspecified       clindamycin 2 % cream    CLEOCIN    40 g    After 7 days, use weekly 5-6 weeks    BV (bacterial vaginosis)       clindamycin-benzoyl Per (Refr) 1.2-5 % Gel    DUAC    45 g    Apply 0.5 inches topically At Bedtime    Acne vulgaris       fluconazole 150 MG tablet    DIFLUCAN    2 tablet    Take 1 tablet (150 mg) by mouth once for 1 dose    Yeast infection of the vagina       hydrochlorothiazide 12.5 MG Tabs tablet     90 tablet    Take 1 tablet (12.5 mg) by mouth daily    Benign essential hypertension       MIRENA (52 MG) 20 MCG/24HR IUD   Generic drug:  levonorgestrel      IUD        multivitamin  peds with iron 60 MG chewable tablet      Take 1 chew tab by mouth daily.        PROBIOTIC ACIDOPHILUS PO           * spironolactone 25 MG tablet    ALDACTONE     Take 125 mg by mouth daily    Acne, unspecified acne type       * spironolactone 100 MG tablet    ALDACTONE    45 tablet    Take 1.5 tablets (150 mg) by mouth daily    Benign essential hypertension, Acne vulgaris       tretinoin 0.05 % Gel     45 g    Apply 0.5 inches topically At Bedtime Spread a pea size amount into affected area.  Use sunscreen SPF>20.    Acne vulgaris       vitamin D 2000 UNITS Caps      1 capsules daily        * Notice:  This list has 2 medication(s) that are the same as other medications prescribed for you. Read the directions carefully, and ask your doctor or other care provider to review them with you.

## 2018-03-29 LAB
BACTERIA SPEC CULT: NORMAL
SPECIMEN SOURCE: NORMAL

## 2018-05-23 ENCOUNTER — MYC MEDICAL ADVICE (OUTPATIENT)
Dept: FAMILY MEDICINE | Facility: CLINIC | Age: 38
End: 2018-05-23

## 2018-05-23 DIAGNOSIS — B96.89 BV (BACTERIAL VAGINOSIS): ICD-10-CM

## 2018-05-23 DIAGNOSIS — N76.0 BV (BACTERIAL VAGINOSIS): ICD-10-CM

## 2018-05-23 DIAGNOSIS — B37.31 YEAST INFECTION OF THE VAGINA: ICD-10-CM

## 2018-05-23 RX ORDER — CLINDAMYCIN PHOSPHATE 20 MG/G
CREAM VAGINAL
Qty: 40 G | Refills: 2 | Status: SHIPPED | OUTPATIENT
Start: 2018-05-23 | End: 2018-07-06

## 2018-05-25 RX ORDER — FLUCONAZOLE 150 MG/1
150 TABLET ORAL ONCE
Qty: 2 TABLET | Refills: 1 | OUTPATIENT
Start: 2018-05-25 | End: 2024-08-08

## 2018-05-25 NOTE — TELEPHONE ENCOUNTER
"Bernie says she \"feels like fire ants in her vagina.\" It is swollen, itchy, watery discharge (but that could be from treatments). She works out with a  for an hour & has 30 minutes ride home and if she doesn't shower right away, she notices symptoms. Wednesday she worked out & later noticed symptoms and was out of the cream.  She tried Monistat today & is using the monistat cream.  She has a Mirena. She used cleocin. She is worried about taking too much stuff & what to do.   She was treated for UTI, BV & yeast on 3/28. She used two doses of diflucan since the antibiotics.   Scheduled 5/29 with PCP.    Dolly Shahid RN   "

## 2018-05-25 NOTE — TELEPHONE ENCOUNTER
Patient is calling to say that her symptoms have gotten worse.  She is requesting a small dosing of Diflucan to be called into [facility].  Today if possible.  Patient caqn be reached at [phone].      Jennifer Mota  Patient Representative

## 2018-05-29 ENCOUNTER — OFFICE VISIT (OUTPATIENT)
Dept: FAMILY MEDICINE | Facility: CLINIC | Age: 38
End: 2018-05-29
Payer: COMMERCIAL

## 2018-05-29 VITALS
HEIGHT: 66 IN | SYSTOLIC BLOOD PRESSURE: 140 MMHG | DIASTOLIC BLOOD PRESSURE: 90 MMHG | TEMPERATURE: 98.3 F | BODY MASS INDEX: 31.02 KG/M2 | WEIGHT: 193 LBS | HEART RATE: 80 BPM

## 2018-05-29 DIAGNOSIS — R03.0 ELEVATED BLOOD PRESSURE READING WITHOUT DIAGNOSIS OF HYPERTENSION: ICD-10-CM

## 2018-05-29 DIAGNOSIS — R30.9 PAIN WITH URINATION: ICD-10-CM

## 2018-05-29 DIAGNOSIS — Z13.220 LIPID SCREENING: ICD-10-CM

## 2018-05-29 DIAGNOSIS — Z13.1 SCREENING FOR DIABETES MELLITUS: ICD-10-CM

## 2018-05-29 DIAGNOSIS — Z11.4 SCREENING FOR HIV (HUMAN IMMUNODEFICIENCY VIRUS): ICD-10-CM

## 2018-05-29 DIAGNOSIS — N76.0 BV (BACTERIAL VAGINOSIS): ICD-10-CM

## 2018-05-29 DIAGNOSIS — Z00.01 ENCOUNTER FOR ROUTINE ADULT HEALTH EXAMINATION WITH ABNORMAL FINDINGS: Primary | ICD-10-CM

## 2018-05-29 DIAGNOSIS — B37.31 YEAST INFECTION OF THE VAGINA: ICD-10-CM

## 2018-05-29 DIAGNOSIS — Z12.31 VISIT FOR SCREENING MAMMOGRAM: ICD-10-CM

## 2018-05-29 DIAGNOSIS — N89.8 VAGINAL DISCHARGE: ICD-10-CM

## 2018-05-29 DIAGNOSIS — B96.89 BV (BACTERIAL VAGINOSIS): ICD-10-CM

## 2018-05-29 LAB
ALBUMIN UR-MCNC: NEGATIVE MG/DL
APPEARANCE UR: CLEAR
BACTERIA #/AREA URNS HPF: ABNORMAL /HPF
BILIRUB UR QL STRIP: NEGATIVE
COLOR UR AUTO: YELLOW
GLUCOSE UR STRIP-MCNC: NEGATIVE MG/DL
HGB UR QL STRIP: ABNORMAL
KETONES UR STRIP-MCNC: NEGATIVE MG/DL
LEUKOCYTE ESTERASE UR QL STRIP: NEGATIVE
NITRATE UR QL: NEGATIVE
NON-SQ EPI CELLS #/AREA URNS LPF: ABNORMAL /LPF
PH UR STRIP: 5.5 PH (ref 5–7)
RBC #/AREA URNS AUTO: ABNORMAL /HPF
SOURCE: ABNORMAL
SP GR UR STRIP: 1.02 (ref 1–1.03)
SPECIMEN SOURCE: ABNORMAL
UROBILINOGEN UR STRIP-ACNC: 0.2 EU/DL (ref 0.2–1)
WBC #/AREA URNS AUTO: ABNORMAL /HPF
WET PREP SPEC: ABNORMAL
WET PREP SPEC: ABNORMAL

## 2018-05-29 PROCEDURE — 99395 PREV VISIT EST AGE 18-39: CPT | Performed by: FAMILY MEDICINE

## 2018-05-29 PROCEDURE — 99213 OFFICE O/P EST LOW 20 MIN: CPT | Mod: 25 | Performed by: FAMILY MEDICINE

## 2018-05-29 PROCEDURE — 87210 SMEAR WET MOUNT SALINE/INK: CPT | Performed by: FAMILY MEDICINE

## 2018-05-29 PROCEDURE — 87491 CHLMYD TRACH DNA AMP PROBE: CPT | Performed by: FAMILY MEDICINE

## 2018-05-29 PROCEDURE — 81001 URINALYSIS AUTO W/SCOPE: CPT | Performed by: FAMILY MEDICINE

## 2018-05-29 PROCEDURE — 87591 N.GONORRHOEAE DNA AMP PROB: CPT | Performed by: FAMILY MEDICINE

## 2018-05-29 RX ORDER — METRONIDAZOLE 500 MG/1
500 TABLET ORAL 2 TIMES DAILY
Qty: 14 TABLET | Refills: 0 | Status: SHIPPED | OUTPATIENT
Start: 2018-05-29 | End: 2018-07-06

## 2018-05-29 RX ORDER — METRONIDAZOLE 7.5 MG/G
GEL VAGINAL
Qty: 70 G | Refills: 1 | Status: SHIPPED | OUTPATIENT
Start: 2018-05-29 | End: 2018-07-06

## 2018-05-29 RX ORDER — FLUCONAZOLE 150 MG/1
150 TABLET ORAL
Qty: 4 TABLET | Refills: 1 | Status: SHIPPED | OUTPATIENT
Start: 2018-05-29 | End: 2018-07-06

## 2018-05-29 NOTE — PATIENT INSTRUCTIONS
Use antibiotics orally and vaginally  Use diflucan as discussed  mammo  Fasting labs  Follow up to recheck bp in 4-6 weeks  And recheck vaginal discharge  Preventive Health Recommendations  Female Ages 26 - 39  Yearly exam:   See your health care provider every year in order to    Review health changes.     Discuss preventive care.      Review your medicines if you your doctor has prescribed any.    Until age 30: Get a Pap test every three years (more often if you have had an abnormal result).    After age 30: Talk to your doctor about whether you should have a Pap test every 3 years or have a Pap test with HPV screening every 5 years.   You do not need a Pap test if your uterus was removed (hysterectomy) and you have not had cancer.  You should be tested each year for STDs (sexually transmitted diseases), if you're at risk.   Talk to your provider about how often to have your cholesterol checked.  If you are at risk for diabetes, you should have a diabetes test (fasting glucose).  Shots: Get a flu shot each year. Get a tetanus shot every 10 years.   Nutrition:     Eat at least 5 servings of fruits and vegetables each day.    Eat whole-grain bread, whole-wheat pasta and brown rice instead of white grains and rice.    Talk to your provider about Calcium and Vitamin D.     Lifestyle    Exercise at least 150 minutes a week (30 minutes a day, 5 days of the week). This will help you control your weight and prevent disease.    Limit alcohol to one drink per day.    No smoking.     Wear sunscreen to prevent skin cancer.    See your dentist every six months for an exam and cleaning.    [facility]   Discharged by : Meenakshi Glez CMA  Paper scripts provided to patient : no    If you have any questions regarding your visit please contact your care team:     Team Gold                Clinic Hours Telephone Number     Dr. Viry Merchant, [place] 7am-7pm   Monday - Thursday   7am-5pm  Fridays  [phone]   (Appointment scheduling available 24/7)     RN Line  [phone] option 2     Urgent Care - [place] - 11am-9pm Monday-Friday Saturday-Sunday- 9am-5pm     [place] -   5pm-9pm Monday-Friday Saturday-Sunday- 9am-5pm    [phone] - [place]    [phone] - [place]       For a Price Quote for your services, please call our Consumer Price Line at [phone].     What options do I have for visits at the clinic other than the traditional office visit?     To expand how we care for you, many of our providers are utilizing electronic visits (e-visits) and telephone visits, when medically appropriate, for interactions with their patients rather than a visit in the clinic. We also offer nurse visits for many medical concerns. Just like any other service, we will bill your insurance company for this type of visit based on time spent on the phone with your provider. Not all insurance companies cover these visits. Please check with your medical insurance if this type of visit is covered. You will be responsible for any charges that are not paid by your insurance.   E-visits via StockStreams: generally incur a $35.00 fee.     Telephone visits:  Time spent on the phone: *charged based on time that is spent on the phone in increments of 10 minutes. Estimated cost:   5-10 mins $30.00   11-20 mins. $59.00   21-30 mins. $85.00       Use StockStreams (secure email communication and access to your chart) to send your primary care provider a message or make an appointment. Ask someone on your Team how to sign up for StockStreams.     As always, Thank you for trusting us with your health care needs!      [facility]:    Scheduling Appointments  [facility] [place]  Call: [phone]    [place] [facility]  Call: [phone]    [facility]  Call: [phone]    For  Gastroenterology referrals   [facility]   Clinics and Surgery Center, 4th Floor   [address] [place] [address]   Appointments: [phone]    WHERE TO GO FOR CARE?  Clinic    Make an appointment if you:       Are sick (cold, cough, flu, sore throat, earache or in pain).       Have a small injury (sprain, small cut, burn or broken bone).       Need a physical exam, Pap smear, vaccine or prescription refill.       Have questions about your health or medicines.    To reach us:      Call [phone] ([phone]). Open 24 hours every day. (For counseling services, call [phone].)    Log into [employer] at Inhance Media. (Visit Tokamak Solutions to create an account.) Hospital emergency room    An emergency is a serious or life- threatening problem that must be treated right away.    Call 451 or get to the hospital if you have:      Very bad or sudden:            - Chest pain or pressure         - Bleeding         - Head or belly pain         - Dizziness or trouble seeing, walking or                          Speaking      Problems breathing      Blood in your vomit or you are coughing up blood      A major injury (knocked out, loss of a finger or limb, rape, broken bone protruding from skin)    A mental health crisis. (Or call the Mental Health Crisis line at 1-611.361.8432 or Suicide Prevention Hotline at 1-414.915.6750.)    Open 24 hours every day. You don't need an appointment.     Urgent care    Visit urgent care for sickness or small injuries when the clinic is closed. You don't need an appointment. To check hours or find an urgent care near you, visit [email or web address]. Online care    Get online care from [facility] for more than 70 common problems, like colds, allergies and infections. Open 24 hours every day at:   [email or web address]   Need help deciding?    For advice about where to be seen, you may call your clinic and ask to speak with a nurse. We're here for you 24 hours  every day.         If you are deaf or hard of hearing, please let us know. We provide many free services including sign language interpreters, oral interpreters, TTYs, telephone amplifiers, note takers and written materials.

## 2018-05-29 NOTE — PROGRESS NOTES
SUBJECTIVE:   CC: Bernie Lara is an 38 year old woman who presents for preventive health visit.     Healthy Habits:    Do you get at least three servings of calcium containing foods daily (dairy, green leafy vegetables, etc.)? yes    Amount of exercise or daily activities, outside of work: 3 day(s) per week    Problems taking medications regularly No    Medication side effects: No    Have you had an eye exam in the past two years? yes    Do you see a dentist twice per year? yes    Do you have sleep apnea, excessive snoring or daytime drowsiness?no      Vaginal Symptoms  Onset: 1 week    Description:  Vaginal Discharge: clear   Itching (Pruritis): YES  Burning sensation:  YES  Odor: no     Accompanying Signs & Symptoms:  Pain with Urination: YES  Abdominal Pain: YES  Fever: no     History:   Sexually active: YES  New Partner: no   Possibility of Pregnancy:  No    Precipitating factors:   Recent Antibiotic Use: YES    Alleviating factors:      Therapies Tried and outcome: yogurt, OTC Monistat, cream     Today's PHQ-2 Score:   PHQ-2 ( 1999 Pfizer) 10/31/2017 5/27/2016   Q1: Little interest or pleasure in doing things 0 0   Q2: Feeling down, depressed or hopeless 0 0   PHQ-2 Score 0 0       Abuse: Current or Past(Physical, Sexual or Emotional)- No  Do you feel safe in your environment - Yes    Social History   Substance Use Topics     Smoking status: Never Smoker     Smokeless tobacco: Never Used      Comment: no second hand smoke     Alcohol use Yes      Comment: rare about 0-1 drink per month     If you drink alcohol do you typically have >3 drinks per day or >7 drinks per week? No                     Reviewed orders with patient.  Reviewed health maintenance and updated orders accordingly - Yes  Labs reviewed in EPIC    Mammogram not appropriate for this patient based on age.    Pertinent mammograms are reviewed under the imaging tab.  History of abnormal Pap smear: NO - age 30-65 PAP every 5 years with  "negative HPV co-testing recommended    Reviewed and updated as needed this visit by clinical staff  Tobacco  Allergies  Meds  Med Hx  Surg Hx  Fam Hx  Soc Hx        Reviewed and updated as needed this visit by Provider        Past Medical History:   Diagnosis Date     ABNORMAL PAP SMEAR OF CERVIX NEC AND HPV 2006 required cryotherapy     ASCUS of cervix with negative high risk HPV 2017     Hypertension      PONV (postoperative nausea and vomiting)       Past Surgical History:   Procedure Laterality Date     ENT SURGERY      surgery for displaced nose     GYN SURGERY           LAPAROSCOPIC GASTRIC ADJUSTABLE BANDING  2012    Procedure: LAPAROSCOPIC GASTRIC ADJUSTABLE BANDING;  LAPAROSCOPIC GASTRIC ADJUSTABLE BANDING ;  Surgeon: Sina Day MD;  Location: [facility] OR     MAMMOPLASTY REDUCTION BILATERAL       Obstetric History       T0      L1     SAB0   TAB0   Ectopic0   Multiple0   Live Births0       # Outcome Date GA Lbr Sid/2nd Weight Sex Delivery Anes PTL Lv   1 Para                   ROS:  CONSTITUTIONAL: NEGATIVE for fever, chills, change in weight  INTEGUMENTARU/SKIN: NEGATIVE for worrisome rashes, moles or lesions  EYES: NEGATIVE for vision changes or irritation  ENT: NEGATIVE for ear, mouth and throat problems  RESP: NEGATIVE for significant cough or SOB  BREAST: NEGATIVE for masses, tenderness or discharge  CV: NEGATIVE for chest pain, palpitations or peripheral edema  GI: NEGATIVE for nausea, abdominal pain, heartburn, or change in bowel habits  : NEGATIVE for unusual urinary or vaginal symptoms. Periods are regular.  MUSCULOSKELETAL: NEGATIVE for significant arthralgias or myalgia  NEURO: NEGATIVE for weakness, dizziness or paresthesias  PSYCHIATRIC: NEGATIVE for changes in mood or affect    OBJECTIVE:   /90 (BP Location: Right arm, Patient Position: Chair, Cuff Size: Adult Regular)  Pulse 80  Temp 98.3  F (36.8  C) (Oral)  Ht 5' 5.5\" (1.664 m)  " Wt 193 lb (87.5 kg)  Breastfeeding? No  BMI 31.63 kg/m2  EXAM:  GENERAL: healthy, alert and no distress  EYES: Eyes grossly normal to inspection, PERRL and conjunctivae and sclerae normal  HENT: ear canals and TM's normal, nose and mouth without ulcers or lesions  NECK: no adenopathy, no asymmetry, masses, or scars and thyroid normal to palpation  RESP: lungs clear to auscultation - no rales, rhonchi or wheezes  BREAST: normal without masses, tenderness or nipple discharge and no palpable axillary masses or adenopathy  CV: regular rate and rhythm, normal S1 S2, no S3 or S4, no murmur, click or rub, no peripheral edema and peripheral pulses strong  ABDOMEN: soft, nontender, no hepatosplenomegaly, no masses and bowel sounds normal   (female): normal female external genitalia, normal urethral meatus, vaginal mucosa pink, moist, well rugated, and normal cervix/adnexa/uterus without masses or discharge  MS: no gross musculoskeletal defects noted, no edema  SKIN: no suspicious lesions or rashes  NEURO: Normal strength and tone, mentation intact and speech normal  PSYCH: mentation appears normal, affect normal/bright    ASSESSMENT/PLAN:       ICD-10-CM    1. Encounter for routine adult health examination with abnormal findings Z00.01 BASIC METABOLIC PANEL   2. Vaginal discharge N89.8 Wet prep     NEISSERIA GONORRHOEA PCR     CHLAMYDIA TRACHOMATIS PCR     Urine Microscopic   3. Yeast infection of the vagina B37.3 fluconazole (DIFLUCAN) 150 MG tablet   4. BV (bacterial vaginosis) N76.0 metroNIDAZOLE (FLAGYL) 500 MG tablet    B96.89 metroNIDAZOLE (METROGEL) 0.75 % vaginal gel   5. Elevated blood pressure reading without diagnosis of hypertension R03.0    6. Visit for screening mammogram Z12.31 MA Screening Digital Bilateral   7. Screening for HIV (human immunodeficiency virus) Z11.4 HIV Screening   8. Pain with urination R30.9 UA reflex to Microscopic and Culture   9. Lipid screening Z13.220 Lipid panel reflex to direct  "LDL Fasting   10. Screening for diabetes mellitus Z13.1 BASIC METABOLIC PANEL   recurrent BV -  Use antibiotics orally and vaginally  Yeast-Use diflucan as discussed  mammo to be done  Elevated bp-advised bp and follow up as planned  Fasting labs  Follow up to recheck bp in 4-6 weeks  And recheck vaginal discharge  COUNSELING:   Reviewed preventive health counseling, as reflected in patient instructions    BP Screening:   Last 3 BP Readings:    BP Readings from Last 3 Encounters:   05/29/18 140/90   03/28/18 128/76   11/06/17 126/74       The following was recommended to the patient:  Re-screen within 4 weeks and recommend lifestyle modifications     reports that she has never smoked. She has never used smokeless tobacco.    Estimated body mass index is 31.63 kg/(m^2) as calculated from the following:    Height as of this encounter: 5' 5.5\" (1.664 m).    Weight as of this encounter: 193 lb (87.5 kg).   Weight management plan: Discussed healthy diet and exercise guidelines and patient will follow up in 3 months in clinic to re-evaluate.    Counseling Resources:  ATP IV Guidelines  Pooled Cohorts Equation Calculator  Breast Cancer Risk Calculator  FRAX Risk Assessment  ICSI Preventive Guidelines  Dietary Guidelines for Americans, 2010  USDA's MyPlate  ASA Prophylaxis  Lung CA Screening    Madhav Samson DO  [facility]  "

## 2018-05-29 NOTE — MR AVS SNAPSHOT
After Visit Summary   5/29/2018    Bernie Lara    MRN: [id number]           Patient Information     Date Of Birth          1980        Visit Information        Provider Department      5/29/2018 2:00 PM Madhav Samson DO [facility]        Today's Diagnoses     Vaginal discharge    -  1    Visit for screening mammogram        Screening for HIV (human immunodeficiency virus)        Pain with urination        Lipid screening        Encounter for routine adult health examination with abnormal findings        Screening for diabetes mellitus        BV (bacterial vaginosis)        Yeast infection of the vagina          Care Instructions    Use antibiotics orally and vaginally  Use diflucan as discussed  mammo  Fasting labs  Follow up to recheck bp in 4-6 weeks  And recheck vaginal discharge  Preventive Health Recommendations  Female Ages 26 - 39  Yearly exam:   See your health care provider every year in order to    Review health changes.     Discuss preventive care.      Review your medicines if you your doctor has prescribed any.    Until age 30: Get a Pap test every three years (more often if you have had an abnormal result).    After age 30: Talk to your doctor about whether you should have a Pap test every 3 years or have a Pap test with HPV screening every 5 years.   You do not need a Pap test if your uterus was removed (hysterectomy) and you have not had cancer.  You should be tested each year for STDs (sexually transmitted diseases), if you're at risk.   Talk to your provider about how often to have your cholesterol checked.  If you are at risk for diabetes, you should have a diabetes test (fasting glucose).  Shots: Get a flu shot each year. Get a tetanus shot every 10 years.   Nutrition:     Eat at least 5 servings of fruits and vegetables each day.    Eat whole-grain bread, whole-wheat pasta and brown rice instead of white grains and rice.    Talk to your  provider about Calcium and Vitamin D.     Lifestyle    Exercise at least 150 minutes a week (30 minutes a day, 5 days of the week). This will help you control your weight and prevent disease.    Limit alcohol to one drink per day.    No smoking.     Wear sunscreen to prevent skin cancer.    See your dentist every six months for an exam and cleaning.    [facility]   Discharged by : Meenakshi Glez CMA  Paper scripts provided to patient : no    If you have any questions regarding your visit please contact your care team:     Team Gold                Clinic Hours Telephone Number     Dr. Viry Merchant, NP 7am-7pm  Monday - Thursday   7am-5pm  Fridays  [phone]   (Appointment scheduling available 24/7)     RN Line  [phone] option 2     Urgent Care - [place] and [facility] - 11am-9pm Monday-Friday Saturday-Sunday- 9am-5pm     [place] -   5pm-9pm Monday-Friday Saturday-Sunday- 9am-5pm    [phone] - [place]    [phone] - [place]       For a Price Quote for your services, please call our Consumer Price Line at [phone].     What options do I have for visits at the clinic other than the traditional office visit?     To expand how we care for you, many of our providers are utilizing electronic visits (e-visits) and telephone visits, when medically appropriate, for interactions with their patients rather than a visit in the clinic. We also offer nurse visits for many medical concerns. Just like any other service, we will bill your insurance company for this type of visit based on time spent on the phone with your provider. Not all insurance companies cover these visits. Please check with your medical insurance if this type of visit is covered. You will be responsible for any charges that are not paid by your insurance.   E-visits via [employer]: generally incur a $35.00 fee.     Telephone  visits:  Time spent on the phone: *charged based on time that is spent on the phone in increments of 10 minutes. Estimated cost:   5-10 mins $30.00   11-20 mins. $59.00   21-30 mins. $85.00       Use [employer] (secure email communication and access to your chart) to send your primary care provider a message or make an appointment. Ask someone on your Team how to sign up for [employer].     As always, Thank you for trusting us with your health care needs!      [facility]:    Scheduling Appointments  [facility]  Call: [phone]    Zana Klein, Breast [facility]  Call: [phone]    [facility]  Call: [phone]    For Gastroenterology referrals   [facility]   Clinics and Surgery Center, 4th Floor   [address] [place] [address]   Appointments: [phone]    WHERE TO GO FOR CARE?  Clinic    Make an appointment if you:       Are sick (cold, cough, flu, sore throat, earache or in pain).       Have a small injury (sprain, small cut, burn or broken bone).       Need a physical exam, Pap smear, vaccine or prescription refill.       Have questions about your health or medicines.    To reach us:      Call [phone] ([phone]). Open 24 hours every day. (For counseling services, call [phone].)    Log into [employer] at [email or web address]. (Visit [email or web address] to create an account.) Hospital emergency room    An emergency is a serious or life- threatening problem that must be treated right away.    Call 776 or get to the hospital if you have:      Very bad or sudden:            - Chest pain or pressure         - Bleeding         - Head or belly pain         - Dizziness or trouble seeing, walking or                          Speaking      Problems breathing      Blood in your vomit or you are coughing up blood      A major injury (knocked out, loss of a finger or limb, rape, broken bone protruding from  skin)    A mental health crisis. (Or call the Mental Health Crisis line at 1-130.716.4507 or Suicide Prevention Hotline at 1-371.102.1248.)    Open 24 hours every day. You don't need an appointment.     Urgent care    Visit urgent care for sickness or small injuries when the clinic is closed. You don't need an appointment. To check hours or find an urgent care near you, visit [email or web address]. Online care    Get online care from [facility] for more than 70 common problems, like colds, allergies and infections. Open 24 hours every day at:   [email or web address]   Need help deciding?    For advice about where to be seen, you may call your clinic and ask to speak with a nurse. We're here for you 24 hours every day.         If you are deaf or hard of hearing, please let us know. We provide many free services including sign language interpreters, oral interpreters, TTYs, telephone amplifiers, note takers and written materials.                   Follow-ups after your visit        Future tests that were ordered for you today     Open Future Orders        Priority Expected Expires Ordered    MA Screening Digital Bilateral Routine  5/29/2019 5/29/2018    HIV Screening Routine  8/29/2018 5/29/2018    BASIC METABOLIC PANEL Routine  8/29/2018 5/29/2018    Lipid panel reflex to direct LDL Fasting Routine  8/29/2018 5/29/2018            Who to contact     If you have questions or need follow up information about today's clinic visit or your schedule please contact [facility] directly at [phone].  Normal or non-critical lab and imaging results will be communicated to you by MyChart, letter or phone within 4 business days after the clinic has received the results. If you do not hear from us within 7 days, please contact the clinic through MyChart or phone. If you have a critical or abnormal lab result, we will notify you by phone as soon as possible.  Submit refill requests through [employer] or call your pharmacy and  "they will forward the refill request to us. Please allow 3 business days for your refill to be completed.          Additional Information About Your Visit        [employer] Information     [employer] gives you secure access to your electronic health record. If you see a primary care provider, you can also send messages to your care team and make appointments. If you have questions, please call your primary care clinic.  If you do not have a primary care provider, please call [phone] and they will assist you.        Care EveryWhere ID     This is your Care EveryWhere ID. This could be used by other organizations to access your [place] [facility] medical records  [id number]        Your Vitals Were     Pulse Temperature Height Breastfeeding? BMI (Body Mass Index)       80 98.3  F (36.8  C) (Oral) 5' 5.5\" (1.664 m) No 31.63 kg/m2        Blood Pressure from Last 3 Encounters:   05/29/18 (!) 150/95   03/28/18 128/76   11/06/17 126/74    Weight from Last 3 Encounters:   05/29/18 193 lb (87.5 kg)   03/28/18 192 lb (87.1 kg)   11/06/17 193 lb (87.5 kg)              We Performed the Following     CHLAMYDIA TRACHOMATIS PCR     NEISSERIA GONORRHOEA PCR     UA reflex to Microscopic and Culture     Urine Microscopic     Wet prep          Today's Medication Changes          These changes are accurate as of 5/29/18  2:56 PM.  If you have any questions, ask your nurse or doctor.               Start taking these medicines.        Dose/Directions    fluconazole 150 MG tablet   Commonly known as:  DIFLUCAN   Used for:  Yeast infection of the vagina   Started by:  Madhav Samson DO        Dose:  150 mg   Take 1 tablet (150 mg) by mouth every 3 days   Quantity:  4 tablet   Refills:  1       metroNIDAZOLE 0.75 % vaginal gel   Commonly known as:  METROGEL   Used for:  BV (bacterial vaginosis)   Started by:  Madhav Samson DO        Use nightly for 5 days then 2 times weekly for 6 weeks   Quantity:  70 g   Refills:  1       " metroNIDAZOLE 500 MG tablet   Commonly known as:  FLAGYL   Used for:  BV (bacterial vaginosis)   Started by:  Madhav Samson DO        Dose:  500 mg   Take 1 tablet (500 mg) by mouth 2 times daily   Quantity:  14 tablet   Refills:  0            Where to get your medicines      These medications were sent to [place] [address] [facility] - [place] - [address]  [address], [place] [address]     Phone:  [phone]     fluconazole 150 MG tablet    metroNIDAZOLE 0.75 % vaginal gel    metroNIDAZOLE 500 MG tablet                Primary Care Provider Office Phone # Fax #    Madhav SamsonDO [phone]       [address] [facility] [address]        Equal Access to Services     KEVIN REID : Josafat mcwilliams Sokim, waaxda luqadaha, qaybta kaalmada adeegyada, brittany galdamez . So [facility] [phone].    ATENCIÓN: Si habla español, tiene a hunter disposición servicios gratuitos de asistencia lingüística. Llame al [phone].    We comply with applicable federal civil rights laws and [place] laws. We do not discriminate on the basis of race, color, national origin, age, disability, sex, sexual orientation, or gender identity.            Thank you!     Thank you for choosing [facility]  for your care. Our goal is always to provide you with excellent care. Hearing back from our patients is one way we can continue to improve our services. Please take a few minutes to complete the written survey that you may receive in the mail after your visit with us. Thank you!             Your Updated Medication List - Protect others around you: Learn how to safely use, store and throw away your medicines at www.disposemymeds.org.          This list is accurate as of 5/29/18  2:56 PM.  Always use your most recent med list.                   Brand Name Dispense Instructions for use Diagnosis    B-12 1000 MCG Caps      as needed Once daily         ciprofloxacin 250 MG tablet    CIPRO    6 tablet    Take 1 tablet (250 mg) by mouth 2 times daily    Dysuria, Urinary tract infection with hematuria, site unspecified       clindamycin 2 % cream    CLEOCIN    40 g    After 7 days, use weekly 5-6 weeks    BV (bacterial vaginosis)       clindamycin-benzoyl Per (Refr) 1.2-5 % Gel    DUAC    45 g    Apply 0.5 inches topically At Bedtime    Acne vulgaris       fluconazole 150 MG tablet    DIFLUCAN    4 tablet    Take 1 tablet (150 mg) by mouth every 3 days    Yeast infection of the vagina       hydrochlorothiazide 12.5 MG Tabs tablet     90 tablet    Take 1 tablet (12.5 mg) by mouth daily    Benign essential hypertension       metroNIDAZOLE 0.75 % vaginal gel    METROGEL    70 g    Use nightly for 5 days then 2 times weekly for 6 weeks    BV (bacterial vaginosis)       metroNIDAZOLE 500 MG tablet    FLAGYL    14 tablet    Take 1 tablet (500 mg) by mouth 2 times daily    BV (bacterial vaginosis)       MIRENA (52 MG) 20 MCG/24HR IUD   Generic drug:  levonorgestrel      IUD        multivitamin  peds with iron 60 MG chewable tablet      Take 1 chew tab by mouth daily.        PROBIOTIC ACIDOPHILUS PO           * spironolactone 25 MG tablet    ALDACTONE     Take 125 mg by mouth daily    Acne, unspecified acne type       * spironolactone 100 MG tablet    ALDACTONE    45 tablet    Take 1.5 tablets (150 mg) by mouth daily    Benign essential hypertension, Acne vulgaris       tretinoin 0.05 % Gel     45 g    Apply 0.5 inches topically At Bedtime Spread a pea size amount into affected area.  Use sunscreen SPF>20.    Acne vulgaris       vitamin D 2000 units Caps      1 capsules daily        * Notice:  This list has 2 medication(s) that are the same as other medications prescribed for you. Read the directions carefully, and ask your doctor or other care provider to review them with you.

## 2018-05-30 LAB
C TRACH DNA SPEC QL NAA+PROBE: NEGATIVE
N GONORRHOEA DNA SPEC QL NAA+PROBE: NEGATIVE
SPECIMEN SOURCE: NORMAL
SPECIMEN SOURCE: NORMAL

## 2018-06-11 NOTE — TELEPHONE ENCOUNTER
Called and gave patient info below. She verbalized understanding. She is able to take the HCTZ. Would you like to order?    Fan De La Rosa RN     No complaints

## 2018-07-06 ENCOUNTER — OFFICE VISIT (OUTPATIENT)
Dept: FAMILY MEDICINE | Facility: CLINIC | Age: 38
End: 2018-07-06
Payer: COMMERCIAL

## 2018-07-06 VITALS
WEIGHT: 193 LBS | HEART RATE: 73 BPM | TEMPERATURE: 97.9 F | BODY MASS INDEX: 31.63 KG/M2 | DIASTOLIC BLOOD PRESSURE: 91 MMHG | SYSTOLIC BLOOD PRESSURE: 132 MMHG | OXYGEN SATURATION: 99 %

## 2018-07-06 DIAGNOSIS — J01.90 ACUTE SINUSITIS WITH SYMPTOMS > 10 DAYS: Primary | ICD-10-CM

## 2018-07-06 DIAGNOSIS — I10 BENIGN ESSENTIAL HYPERTENSION: ICD-10-CM

## 2018-07-06 PROCEDURE — 99213 OFFICE O/P EST LOW 20 MIN: CPT | Performed by: PHYSICIAN ASSISTANT

## 2018-07-06 RX ORDER — CLINDAMYCIN PHOSPHATE AND BENZOYL PEROXIDE 10; 50 MG/G; MG/G
GEL TOPICAL AT BEDTIME
Refills: 11 | COMMUNITY
Start: 2017-11-14 | End: 2018-09-28

## 2018-07-06 RX ORDER — FLUTICASONE PROPIONATE 50 MCG
1-2 SPRAY, SUSPENSION (ML) NASAL DAILY
Qty: 1 BOTTLE | Refills: 11 | Status: SHIPPED | OUTPATIENT
Start: 2018-07-06 | End: 2019-08-19

## 2018-07-06 RX ORDER — FLUCONAZOLE 150 MG/1
150 TABLET ORAL ONCE
Qty: 3 TABLET | Refills: 0 | Status: SHIPPED | OUTPATIENT
Start: 2018-07-06 | End: 2018-07-06

## 2018-07-06 NOTE — PROGRESS NOTES
"  SUBJECTIVE:   Bernie Lara is a 38 year old female who presents to clinic today for the following health issues:    Acute Illness   Acute illness concerns: URI  Onset: 2 weeks    Fever: no     Chills/Sweats: no     Headache (location?): YES    Sinus Pressure:YES    Conjunctivitis:  no    Ear Pain: YES: bilateral    Rhinorrhea: YES    Congestion: YES    Sore Throat: YES     Cough: YES-non-productive, productive of clear sputum, productive of green sputum    Wheeze: no     Decreased Appetite: no     Nausea: no     Vomiting: no     Diarrhea:  no     Dysuria/Freq.: no     Fatigue/Achiness: YES    Sick/Strep Exposure: no      Therapies Tried and outcome: [facility], on Monday before July 4th had a negative strep test, hot tea helped the throat and cough, nasal saline helped somewhat.  She stated she was hesitant to try any other OTC mediations, as she didn't want to raise her BP    She reports she has been blowing her nose, coughing, experiencing sinus pressure, and left ear is drainage x 2 weeks.  She reports that she has not improved, despite her over the counter therapies, and reports, \"I don't feel like I'm getting better at all.\"  She has had a sinus infection in the past and reports this feels similar.    Problem list and histories reviewed & adjusted, as indicated.  Additional history: as documented    Reviewed and updated as needed this visit by clinical staff  Tobacco  Allergies  Meds  Med Hx  Surg Hx  Fam Hx  Soc Hx      Reviewed and updated as needed this visit by Provider       ROS:  Constitutional, HEENT, cardiovascular, pulmonary, gi and gu systems are negative, except as otherwise noted.    OBJECTIVE:     BP (!) 132/91 (BP Location: Right arm, Patient Position: Sitting, Cuff Size: Adult Large)  Pulse 73  Temp 97.9  F (36.6  C) (Oral)  Wt 193 lb (87.5 kg)  SpO2 99%  BMI 31.63 kg/m2  Body mass index is 31.63 kg/(m^2).    GENERAL: healthy, alert and no distress  EYES: " Eyes grossly normal to inspection, PERRL and conjunctivae and sclerae normal  HENT: ear canals and TM's normal, nose and mouth without ulcers or lesions, TTP over the frontal and maxillary sinuses  NECK: no adenopathy, no asymmetry, masses, or scars  RESP: lungs clear to auscultation - no rales, rhonchi or wheezes  CV: regular rate and rhythm, normal S1 S2, no S3 or S4, no murmur, click or rub  MS: no gross musculoskeletal defects noted, no edema  SKIN: no suspicious lesions or rashes  NEURO: Normal strength and tone, mentation intact and speech normal  PSYCH: mentation appears normal, affect normal/bright    Diagnostic Test Results:  none     ASSESSMENT/PLAN:       ICD-10-CM    1. Acute sinusitis with symptoms > 10 days J01.90 amoxicillin-clavulanate (AUGMENTIN) 875-125 MG per tablet     fluconazole (DIFLUCAN) 150 MG tablet     fluticasone (FLONASE) 50 MCG/ACT spray   2. Benign essential hypertension I10      Plan:  -Discussed that given duration of symptoms, will prescribe Augmentin for a likely sinus infection.  Encouraged pt to take entire course of antibiotics.  Encouraged pt to try Flonase as well  -Prescribed Diflucan, per pt request.  She reports a history of yeast infections after antibiotics  -Encouraged pt to follow-up with PCP regarding elevated BP readings    Patient verbalized understanding and agreement with the plan and has no further questions at this time.    Patient seen by FREDERICK Hua and FABI FrancoC  I have repeated and confirmed the above history and physical and agree with the above findings, assessment and plan.  Jane Logan PA-C   [facility]

## 2018-07-06 NOTE — MR AVS SNAPSHOT
After Visit Summary   7/6/2018    Bernie Lara    MRN: [id number]           Patient Information     Date Of Birth          1980        Visit Information        Provider Department      7/6/2018 9:20 AM Jane Logan PA-C [facility]        Today's Diagnoses     Acute sinusitis with symptoms > 10 days    -  1    Benign essential hypertension           Follow-ups after your visit        Follow-up notes from your care team     Return in about 4 weeks (around 8/3/2018) for BP Recheck.      Your next 10 appointments already scheduled     Jul 10, 2018  1:40 PM CDT   Office Visit with Madhav Samson DO   [facility] ([facility])    [address] [place] [id number] [phone]           Bring a current list of meds and any records pertaining to this visit. For Physicals, please bring immunization records and any forms needing to be filled out. Please arrive 10 minutes early to complete paperwork.              Who to contact     If you have questions or need follow up information about today's clinic visit or your schedule please contact [facility] directly at [phone].  Normal or non-critical lab and imaging results will be communicated to you by MyChart, letter or phone within 4 business days after the clinic has received the results. If you do not hear from us within 7 days, please contact the clinic through MyChart or phone. If you have a critical or abnormal lab result, we will notify you by phone as soon as possible.  Submit refill requests through [employer] or call your pharmacy and they will forward the refill request to us. Please allow 3 business days for your refill to be completed.          Additional Information About Your Visit        MyChart Information     [employer] gives you secure access to your electronic health record. If you see a primary care provider, you can  also send messages to your care team and make appointments. If you have questions, please call your primary care clinic.  If you do not have a primary care provider, please call [phone] and they will assist you.        Care EveryWhere ID     This is your Care EveryWhere ID. This could be used by other organizations to access your [facility] medical records  [id number]        Your Vitals Were     Pulse Temperature Pulse Oximetry BMI (Body Mass Index)          73 97.9  F (36.6  C) (Oral) 99% 31.63 kg/m2         Blood Pressure from Last 3 Encounters:   07/06/18 (!) 132/91   05/29/18 140/90   03/28/18 128/76    Weight from Last 3 Encounters:   07/06/18 193 lb (87.5 kg)   05/29/18 193 lb (87.5 kg)   03/28/18 192 lb (87.1 kg)              Today, you had the following     No orders found for display         Today's Medication Changes          These changes are accurate as of 7/6/18 10:08 AM.  If you have any questions, ask your nurse or doctor.               Start taking these medicines.        Dose/Directions    amoxicillin-clavulanate 875-125 MG per tablet   Commonly known as:  AUGMENTIN   Used for:  Acute sinusitis with symptoms > 10 days   Started by:  Jane Logan PA-C        Dose:  1 tablet   Take 1 tablet by mouth 2 times daily   Quantity:  20 tablet   Refills:  0       fluconazole 150 MG tablet   Commonly known as:  DIFLUCAN   Used for:  Acute sinusitis with symptoms > 10 days   Started by:  Jane Logan PA-C        Dose:  150 mg   Take 1 tablet (150 mg) by mouth once for 1 dose   Quantity:  3 tablet   Refills:  0       fluticasone 50 MCG/ACT spray   Commonly known as:  FLONASE   Used for:  Acute sinusitis with symptoms > 10 days   Started by:  Jane Logan PA-C        Dose:  1-2 spray   Spray 1-2 sprays into both nostrils daily   Quantity:  1 Bottle   Refills:  11            Where to get your medicines      These medications were sent to [place] [address] [place] [facility]  [address]  [address], [place] [address]     Phone:  [phone]     amoxicillin-clavulanate 875-125 MG per tablet    fluconazole 150 MG tablet    fluticasone 50 MCG/ACT spray                Primary Care Provider Office Phone # Fax #    Madhav Samson [phone]       [address] [facility] [address]        Equal Access to Services     KEVIN REID : Hadii aad ku hadasho Soomaali, waaxda luqadaha, qaybta kaalmada adeegyada, waxay idiin hayaan adeeg kharash la'jorge . So [facility] [phone].    ATENCIÓN: Si habla español, tiene a hunter disposición servicios gratuitos de asistencia lingüística. [facility] [phone].    We comply with applicable federal civil rights laws and [place] laws. We do not discriminate on the basis of race, color, national origin, age, disability, sex, sexual orientation, or gender identity.            Thank you!     Thank you for choosing [facility]  for your care. Our goal is always to provide you with excellent care. Hearing back from our patients is one way we can continue to improve our services. Please take a few minutes to complete the written survey that you may receive in the mail after your visit with us. Thank you!             Your Updated Medication List - Protect others around you: Learn how to safely use, store and throw away your medicines at www.disposemymeds.org.          This list is accurate as of 7/6/18 10:08 AM.  Always use your most recent med list.                   Brand Name Dispense Instructions for use Diagnosis    amoxicillin-clavulanate 875-125 MG per tablet    AUGMENTIN    20 tablet    Take 1 tablet by mouth 2 times daily    Acute sinusitis with symptoms > 10 days       B-12 1000 MCG Caps      as needed Once daily        clindamycin-benzoyl Per (Refr) 1.2-5 % Gel      Apply topically At Bedtime        fluconazole 150 MG tablet    DIFLUCAN    3 tablet    Take 1 tablet (150 mg) by mouth once for 1  dose    Acute sinusitis with symptoms > 10 days       fluticasone 50 MCG/ACT spray    FLONASE    1 Bottle    Spray 1-2 sprays into both nostrils daily    Acute sinusitis with symptoms > 10 days       MIRENA (52 MG) 20 MCG/24HR IUD   Generic drug:  levonorgestrel      IUD        multivitamin  peds with iron 60 MG chewable tablet      Take 1 chew tab by mouth daily.        PROBIOTIC ACIDOPHILUS PO           vitamin D 2000 units Caps      1 capsules daily

## 2018-07-23 ENCOUNTER — OFFICE VISIT (OUTPATIENT)
Dept: FAMILY MEDICINE | Facility: CLINIC | Age: 38
End: 2018-07-23
Payer: COMMERCIAL

## 2018-07-23 VITALS
HEART RATE: 76 BPM | TEMPERATURE: 98.1 F | SYSTOLIC BLOOD PRESSURE: 138 MMHG | DIASTOLIC BLOOD PRESSURE: 88 MMHG | HEIGHT: 66 IN | BODY MASS INDEX: 30.53 KG/M2 | WEIGHT: 190 LBS

## 2018-07-23 DIAGNOSIS — Z13.220 LIPID SCREENING: ICD-10-CM

## 2018-07-23 DIAGNOSIS — N76.0 BV (BACTERIAL VAGINOSIS): ICD-10-CM

## 2018-07-23 DIAGNOSIS — N89.8 VAGINAL DISCHARGE: ICD-10-CM

## 2018-07-23 DIAGNOSIS — I10 BENIGN ESSENTIAL HYPERTENSION: Primary | ICD-10-CM

## 2018-07-23 DIAGNOSIS — R92.8 ABNORMAL MAMMOGRAM: ICD-10-CM

## 2018-07-23 DIAGNOSIS — B96.89 BV (BACTERIAL VAGINOSIS): ICD-10-CM

## 2018-07-23 DIAGNOSIS — Z12.31 VISIT FOR SCREENING MAMMOGRAM: ICD-10-CM

## 2018-07-23 DIAGNOSIS — Z13.1 SCREENING FOR DIABETES MELLITUS: ICD-10-CM

## 2018-07-23 DIAGNOSIS — Z11.4 SCREENING FOR HIV (HUMAN IMMUNODEFICIENCY VIRUS): ICD-10-CM

## 2018-07-23 LAB
ANION GAP SERPL CALCULATED.3IONS-SCNC: 8 MMOL/L (ref 3–14)
BUN SERPL-MCNC: 14 MG/DL (ref 7–30)
CALCIUM SERPL-MCNC: 8.5 MG/DL (ref 8.5–10.1)
CHLORIDE SERPL-SCNC: 109 MMOL/L (ref 94–109)
CHOLEST SERPL-MCNC: 163 MG/DL
CO2 SERPL-SCNC: 24 MMOL/L (ref 20–32)
CREAT SERPL-MCNC: 1 MG/DL (ref 0.52–1.04)
GFR SERPL CREATININE-BSD FRML MDRD: 63 ML/MIN/1.7M2
GLUCOSE SERPL-MCNC: 86 MG/DL (ref 70–99)
HDLC SERPL-MCNC: 55 MG/DL
LDLC SERPL CALC-MCNC: 93 MG/DL
NONHDLC SERPL-MCNC: 108 MG/DL
POTASSIUM SERPL-SCNC: 4.2 MMOL/L (ref 3.4–5.3)
SODIUM SERPL-SCNC: 141 MMOL/L (ref 133–144)
SPECIMEN SOURCE: ABNORMAL
TRIGL SERPL-MCNC: 73 MG/DL
WET PREP SPEC: ABNORMAL

## 2018-07-23 PROCEDURE — 87210 SMEAR WET MOUNT SALINE/INK: CPT | Performed by: FAMILY MEDICINE

## 2018-07-23 PROCEDURE — 99214 OFFICE O/P EST MOD 30 MIN: CPT | Performed by: FAMILY MEDICINE

## 2018-07-23 PROCEDURE — 36415 COLL VENOUS BLD VENIPUNCTURE: CPT | Performed by: FAMILY MEDICINE

## 2018-07-23 PROCEDURE — 80061 LIPID PANEL: CPT | Performed by: FAMILY MEDICINE

## 2018-07-23 PROCEDURE — 80048 BASIC METABOLIC PNL TOTAL CA: CPT | Performed by: FAMILY MEDICINE

## 2018-07-23 PROCEDURE — 87389 HIV-1 AG W/HIV-1&-2 AB AG IA: CPT | Performed by: FAMILY MEDICINE

## 2018-07-23 RX ORDER — METRONIDAZOLE 7.5 MG/G
GEL VAGINAL
Qty: 70 G | Refills: 2 | Status: SHIPPED | OUTPATIENT
Start: 2018-07-23 | End: 2018-09-28

## 2018-07-23 RX ORDER — METRONIDAZOLE 500 MG/1
500 TABLET ORAL 2 TIMES DAILY
Qty: 14 TABLET | Refills: 0 | Status: SHIPPED | OUTPATIENT
Start: 2018-07-23 | End: 2019-01-02

## 2018-07-23 NOTE — LETTER
[facility]  [address] [place] [id number] [phone]                                                                                                August 13, 2018    Bernie Lara  [address]  [place] [address]        Dear Ms. Lara,    Your recent lab results were within normal limits. A copy of those results are included with this letter.      Sincerely,      Madhav Samson, DO/hl    Results for orders placed or performed in visit on 07/23/18   HIV Screening   Result Value Ref Range    HIV Antigen Antibody Combo Nonreactive NR^Nonreactive       BASIC METABOLIC PANEL   Result Value Ref Range    Sodium 141 133 - 144 mmol/L    Potassium 4.2 3.4 - 5.3 mmol/L    Chloride 109 94 - 109 mmol/L    Carbon Dioxide 24 20 - 32 mmol/L    Anion Gap 8 3 - 14 mmol/L    Glucose 86 70 - 99 mg/dL    Urea Nitrogen 14 7 - 30 mg/dL    Creatinine 1.00 0.52 - 1.04 mg/dL    GFR Estimate 63 >60 mL/min/1.7m2    GFR Estimate If Black 76 >60 mL/min/1.7m2    Calcium 8.5 8.5 - 10.1 mg/dL   Lipid panel reflex to direct LDL Fasting   Result Value Ref Range    Cholesterol 163 <200 mg/dL    Triglycerides 73 <150 mg/dL    HDL Cholesterol 55 >49 mg/dL    LDL Cholesterol Calculated 93 <100 mg/dL    Non HDL Cholesterol 108 <130 mg/dL   Wet prep   Result Value Ref Range    Specimen Description Vagina     Wet Prep No Trichomonas seen     Wet Prep Clue cells seen (A)     Wet Prep No yeast seen

## 2018-07-23 NOTE — PROGRESS NOTES
"  SUBJECTIVE:   Bernie Lara is a 38 year old female who presents to clinic today for the following health issues:    Patient is fasting for labs today. Previous labs reviewed and discussed with patient.       BV and Sinusitis   Patient finished Augmentin, finished last of 3 diflucan. Using third day of metrogel. She was reported that she was sick entire months of July. She was diagnosed with Acute sinusitis on 7/6/18 and BV on 5/29/18.  She is using Metrogel weekly now, has not used yesterday in anticipation of this visit. She reports that she is still experiencing vaginal itch. Denies abdominal pain or urinary symptoms of urinary frequency or dysuria.     Hypertension Follow-up      Outpatient blood pressures are not being checked. Been high at every visit.     Low Salt Diet: no added salt. Has a lot of high stressors in life currently.       Amount of exercise or physical activity: 2-3 times weekly for one hour    Problems taking medications regularly: No    Medication side effects: none    Diet: low salt    Patient is currently not taking blood pressure medication. Denies palpitations or vertigo.     She reported that she used spirolactone for acne in the past and \"hated this\". She also reported that she was prescribed hydrochlorothiazide in the past. She reported that right kidney \"shrunk\" as a result of spirolactone.     Mammogram  Last completed in 2015. 6 months follow up was recommended at that time.     History: Left breast lump. History of reduction mammoplasty. History  recent weight loss.     BREAST DENSITY: Scattered fibroglandular densities.     COMMENTS: Postsurgical changes of reduction mammoplasty. The patient's  area of palpable concern was evaluated with ultrasound by the  radiologist and technologist. At the 10:00 position 3 cm the nipple  shadowing calcifications are identified consistent with the  postoperative appearance of fat necrosis demonstrated on mammography. "          IMPRESSION  IMPRESSION: BI-RADS CATEGORY: 3 - Probably Benign Finding-Short  Interval Follow-Up Suggested.    HIV Screening  HIV CDC guidelines discussed with patient. Patient would like HIV screening today.             Problem list and histories reviewed & adjusted, as indicated.  Additional history: as documented    Patient Active Problem List   Diagnosis     Acne     Hyperhidrosis of axilla     Obesity     CARDIOVASCULAR SCREENING; LDL GOAL LESS THAN 130     Melanocytic nevus     S/P Adjustable Band baraitric surgery      IUD (intrauterine device) in place     Abnormal mammogram     BMI 28.0-28.9,adult     Postsurgical nonabsorption     ASCUS of cervix with negative high risk HPV     Benign essential hypertension     Elevated serum creatinine     Past Surgical History:   Procedure Laterality Date     ENT SURGERY      surgery for displaced nose     GYN SURGERY           LAPAROSCOPIC GASTRIC ADJUSTABLE BANDING  2012    Procedure: LAPAROSCOPIC GASTRIC ADJUSTABLE BANDING;  LAPAROSCOPIC GASTRIC ADJUSTABLE BANDING ;  Surgeon: Sina Day MD;  Location: [facility] OR     MAMMOPLASTY REDUCTION BILATERAL         Social History   Substance Use Topics     Smoking status: Never Smoker     Smokeless tobacco: Never Used      Comment: no second hand smoke     Alcohol use Yes      Comment: rare about 0-1 drink per month     Family History   Problem Relation Age of Onset     Thyroid Disease Father      Diabetes Maternal Grandmother      Hypertension Maternal Grandmother      Breast Cancer Maternal Grandmother      late 60's     Diabetes Maternal Grandfather      Hypertension Maternal Grandfather      Thyroid Disease Paternal Grandmother      Thyroid Disease Sister      C.A.D. No family hx of      Cancer - colorectal No family hx of      Cerebrovascular Disease No family hx of      Asthma No family hx of          Current Outpatient Prescriptions   Medication Sig Dispense Refill     Cholecalciferol (VITAMIN D)  2000 UNITS CAPS 1 capsules daily       clindamycin-benzoyl Per, Refr, 1.2-5 % GEL Apply topically At Bedtime  11     Cyanocobalamin (B-12) 1000 MCG CAPS as needed Once daily       fluticasone (FLONASE) 50 MCG/ACT spray Spray 1-2 sprays into both nostrils daily 1 Bottle 11     Lactobacillus (PROBIOTIC ACIDOPHILUS PO)        MIRENA 20 MCG/24HR IU IUD IUD       multivitamin  peds with iron (FLINTSTONES COMPLETE) 60 MG chewable tablet Take 1 chew tab by mouth daily.       Allergies   Allergen Reactions     Bactrim [Sulfamethoxazole W/Trimethoprim]      Seasonal Allergies      Sulfa Drugs      Recent Labs   Lab Test  11/06/17   1022  10/31/17   1408   04/18/17   1034   04/03/15   0955  11/20/14   1307  02/05/14   0843   01/05/12   1035   04/25/11   1548   A1C   --    --    --    --    --    --    --    --    --   5.3   --    --    LDL   --    --    --   101*   --   59   --   85   < >   --    --    --    HDL   --    --    --   56   --   50*   --   40*   < >   --    --    --    TRIG   --    --    --   92   --   74   --   111   < >   --    --    --    ALT   --    --    --    --    --   21  21   --    --    --    --   26   CR  0.92  1.28*   < >  1.09*   < >  0.98  0.93  0.84   < >   --    < >  0.80   GFRESTIMATED  69  47*   < >  57*   --   66  69  79   < >   --    < >  85   GFRESTBLACK  84  57*   < >  69   --   79  84  >90   < >   --    < >  >90   POTASSIUM  4.0  4.0   < >  4.6   < >  4.2  4.1  4.3   < >   --    < >  3.7   TSH   --    --    --    --    --   1.20  1.14  1.06   --   1.69   < >   --     < > = values in this interval not displayed.      BP Readings from Last 3 Encounters:   07/23/18 138/88   07/06/18 (!) 132/91   05/29/18 140/90    Wt Readings from Last 3 Encounters:   07/23/18 86.2 kg (190 lb)   07/06/18 87.5 kg (193 lb)   05/29/18 87.5 kg (193 lb)                  Labs reviewed in EPIC    Reviewed and updated as needed this visit by clinical staff       Reviewed and updated as needed this visit by Provider      "    ROS:  Constitutional, HEENT, cardiovascular, pulmonary, GI, , musculoskeletal, neuro, skin, endocrine and psych systems are negative, except as otherwise noted.    This document serves as a record of the services and decisions personally performed and made by Madhav Samson D.O. It was created on her behalf by Oswaldo Watson, a trained medical scribe. The creation of this document is based on the provider's statements to the medical scribe.  Oswaldo Watson July 23, 2018 9:21 AM      OBJECTIVE:     /88  Pulse 76  Temp 98.1  F (36.7  C) (Oral)  Ht 1.664 m (5' 5.5\")  Wt 86.2 kg (190 lb)  BMI 31.14 kg/m2  Body mass index is 31.14 kg/(m^2).  GENERAL: alert, no distress and obese  EYES: Eyes grossly normal to inspection, PERRL and conjunctivae and sclerae normal  NECK: no adenopathy, no asymmetry, masses, or scars and thyroid normal to palpation  RESP: lungs clear to auscultation - no rales, rhonchi or wheezes  CV: regular rate and rhythm, normal S1 S2, no S3 or S4, no murmur, click or rub, no peripheral edema and peripheral pulses strong  : self collect.   ABDOMEN: soft, nontender, no hepatosplenomegaly, no masses and bowel sounds normal  MS: no gross musculoskeletal defects noted, no edema  NEURO: Normal strength and tone, mentation intact and speech normal  PSYCH: mentation appears normal, affect normal/bright, mild anxiety    Diagnostic Test Results:  No results found for this or any previous visit (from the past 24 hour(s)).  Results for orders placed or performed in visit on 05/29/18   UA reflex to Microscopic and Culture   Result Value Ref Range    Color Urine Yellow     Appearance Urine Clear     Glucose Urine Negative NEG^Negative mg/dL    Bilirubin Urine Negative NEG^Negative    Ketones Urine Negative NEG^Negative mg/dL    Specific Gravity Urine 1.020 1.003 - 1.035    Blood Urine Trace (A) NEG^Negative    pH Urine 5.5 5.0 - 7.0 pH    Protein Albumin Urine Negative NEG^Negative mg/dL    " "Urobilinogen Urine 0.2 0.2 - 1.0 EU/dL    Nitrite Urine Negative NEG^Negative    Leukocyte Esterase Urine Negative NEG^Negative    Source Midstream Urine    Urine Microscopic   Result Value Ref Range    WBC Urine 0 - 5 OTO5^0 - 5 /HPF    RBC Urine O - 2 OTO2^O - 2 /HPF    Squamous Epithelial /LPF Urine Few FEW^Few /LPF    Bacteria Urine Few (A) NEG^Negative /HPF   Wet prep   Result Value Ref Range    Specimen Description Vagina     Wet Prep No Trichomonas seen     Wet Prep Clue cells seen  Yeast seen   (A)    NEISSERIA GONORRHOEA PCR   Result Value Ref Range    Specimen Descrip Vagina     N Gonorrhea PCR Negative NEG^Negative   CHLAMYDIA TRACHOMATIS PCR   Result Value Ref Range    Specimen Description Vagina     Chlamydia Trachomatis PCR Negative NEG^Negative       ASSESSMENT/PLAN:     Tobacco Cessation:   reports that she has never smoked. She has never used smokeless tobacco.      BMI:   Estimated body mass index is 31.14 kg/(m^2) as calculated from the following:    Height as of this encounter: 1.664 m (5' 5.5\").    Weight as of this encounter: 86.2 kg (190 lb).   Weight management plan: Discussed healthy diet and exercise guidelines and patient will follow up in 6 months in clinic to re-evaluate.      1. Benign essential hypertension  Blood pressure remains elevated today.  Labs today. Patient will monitor blood pressures at home and follow up if greater than 120/80. She reports that elevated blood pressures are anxiety induced and is currently going through many life stressors.   Close follow up , if not resolving follow up for hydrochlorothiazide start  - BASIC METABOLIC PANEL    2. Vaginal discharge  Patient self collected today. Patient finished Augmentin, finished last of 3 diflucan. Using third day of metrogel. She was reported that she was sick entire months of July. She was diagnosed with Acute sinusitis on 7/6/18 and BV on 5/29/18.  She is using Metrogel weekly now, has not used yesterday in " anticipation of this visit. She reports that she is still experiencing vaginal itch. Denies abdominal pain or urinary symptoms of urinary frequency or dysuria.   - Wet prep    3. BV (bacterial vaginosis)  Patient will begin and Flagyl and continue taking Metrogel.   - metroNIDAZOLE (FLAGYL) 500 MG tablet; Take 1 tablet (500 mg) by mouth 2 times daily  Dispense: 14 tablet; Refill: 0  - metroNIDAZOLE (METROGEL) 0.75 % vaginal gel; Use daily for 5 daily then weekly vaginally for 6 weeks  Dispense: 70 g; Refill: 2    4. Abnormal mammogram  5. Visit for screening mammogram  Last completed in 2015 with abnormal finding that seems to be benign. Patient was advised to follow up with repeat mammogram at that time and has not yet done so. Patient was recommended to follow up with mammogram as advised.   - MA Diagnostic Digital Bilateral; Future      6. Screening for HIV (human immunodeficiency virus)  HIV CDC guidelines discussed with patient. Patient would like HIV screening today.   - HIV Screening    7. Screening for diabetes mellitus  Recheck labs today.     8. Lipid screening  Recheck labs today.   - Lipid panel reflex to direct LDL Fasting      Patient instructions discussed with patient    The information in this document, created by the medical scribe for me, accurately reflects the services I personally performed and the decisions made by me. I have reviewed and approved this document for accuracy prior to leaving the patient care area.  July 23, 2018 10:16 AM      Madhav Samson DO  [facility]

## 2018-07-24 LAB — HIV 1+2 AB+HIV1 P24 AG SERPL QL IA: NONREACTIVE

## 2018-08-19 ENCOUNTER — HEALTH MAINTENANCE LETTER (OUTPATIENT)
Age: 38
End: 2018-08-19

## 2018-09-28 ENCOUNTER — MYC REFILL (OUTPATIENT)
Dept: FAMILY MEDICINE | Facility: CLINIC | Age: 38
End: 2018-09-28

## 2018-09-28 DIAGNOSIS — B96.89 BV (BACTERIAL VAGINOSIS): ICD-10-CM

## 2018-09-28 DIAGNOSIS — L70.0 ACNE VULGARIS: Primary | ICD-10-CM

## 2018-09-28 DIAGNOSIS — N76.0 BV (BACTERIAL VAGINOSIS): ICD-10-CM

## 2018-09-28 DIAGNOSIS — I10 BENIGN ESSENTIAL HYPERTENSION: ICD-10-CM

## 2018-09-28 RX ORDER — CLINDAMYCIN PHOSPHATE AND BENZOYL PEROXIDE 10; 50 MG/G; MG/G
GEL TOPICAL AT BEDTIME
Qty: 1 TUBE | Refills: 11 | Status: SHIPPED | OUTPATIENT
Start: 2018-09-28 | End: 2019-08-19

## 2018-09-28 RX ORDER — CLINDAMYCIN PHOSPHATE AND BENZOYL PEROXIDE 10; 50 MG/G; MG/G
GEL TOPICAL AT BEDTIME
Refills: 11 | Status: CANCELLED | OUTPATIENT
Start: 2018-09-28

## 2018-09-28 RX ORDER — METRONIDAZOLE 7.5 MG/G
GEL VAGINAL
Qty: 70 G | Refills: 0 | Status: SHIPPED | OUTPATIENT
Start: 2018-09-28 | End: 2019-01-02

## 2018-09-28 RX ORDER — HYDROCHLOROTHIAZIDE 12.5 MG/1
12.5 TABLET ORAL DAILY
Qty: 30 TABLET | Refills: 0 | Status: SHIPPED | OUTPATIENT
Start: 2018-09-28 | End: 2018-12-03

## 2018-09-28 NOTE — TELEPHONE ENCOUNTER
"Route refill request for hydrochlorothiazide to PCP.  Please see my note below.  Your note from 7/23/18 visit pasted below also.    Patient notified of Clindamycin and Metronidazole refills and to f/u for persisting vaginal sx, and she voices understanding. She is wondering about a refill of her hydrochlorothiazide also, states she had been taken off of this and Dr. Samson recommended to watch this at home, and if elevated again, may need to go back on it.  Patient states she had been checking at home since July, and BP's were getting up into the 140's/90's, so she restarted the hydrochlorothiazide, and now for the past couple of weeks they have been around 130/70's.  Should she continue with the hydrochlorothiazide? Note from PCP visit on 7/23/18 pasted below:    \"1. Benign essential hypertension  Blood pressure remains elevated today.  Labs today. Patient will monitor blood pressures at home and follow up if greater than 120/80. She reports that elevated blood pressures are anxiety induced and is currently going through many life stressors.   Close follow up , if not resolving follow up for hydrochlorothiazide start  - BASIC METABOLIC PANEL\"    Marni Parikh RN    "

## 2018-09-28 NOTE — TELEPHONE ENCOUNTER
Thanks for calling patient and getting the history. We need a visit for adjusting med  Continue hydrochlorothiazide  Bring in machine for bp  If she needs more hydrochlorothiazide, ok to refill until the visit. We will do labs as well.  Madhav Samson D.O.

## 2018-09-28 NOTE — TELEPHONE ENCOUNTER
I gave 2 refills so should be ok with it  I can refill X1 , should follow up if persisting with gyn  clinda done  Madhav Samson D.O.

## 2018-09-28 NOTE — TELEPHONE ENCOUNTER
LOV- 7/23/18. Two refills of Metrogel were sent at that time, I was on hold with [facility] for 5 minutes but was unable to ask if she had refills remaining. Clindamycin and hydrochlorothiazide were dc'd by an outside MA on 7/6/18 but were previously ordered by Dr. Samson.   Dolly Shahid RN    Requested Prescriptions   Pending Prescriptions Disp Refills     metroNIDAZOLE (METROGEL) 0.75 % vaginal gel 70 g 2     Sig: Use daily for 5 daily then weekly vaginally for 6 weeks    There is no refill protocol information for this order        clindamycin-benzoyl Per, Refr, 1.2-5 % GEL  11     Sig: Apply topically At Bedtime    There is no refill protocol information for this order

## 2018-09-28 NOTE — TELEPHONE ENCOUNTER
Message from Nervogridhart:  Original authorizing provider: DO Bernie Aguirre would like a refill of the following medications:  metroNIDAZOLE (METROGEL) 0.75 % vaginal gel [Madhav Samson DO]    Preferred pharmacy: [facility] [id number] [facility], [place] - [address]    Comment:  Can I also get a refill for: clindamycin-benzoyl Per (Refr) 1.2-5 % Gel & hydrochlorothiazide 12.5 mg

## 2018-12-03 DIAGNOSIS — I10 BENIGN ESSENTIAL HYPERTENSION: ICD-10-CM

## 2018-12-04 DIAGNOSIS — I10 BENIGN ESSENTIAL HYPERTENSION: ICD-10-CM

## 2018-12-04 RX ORDER — HYDROCHLOROTHIAZIDE 12.5 MG/1
12.5 TABLET ORAL DAILY
Qty: 30 TABLET | Refills: 0 | Status: SHIPPED | OUTPATIENT
Start: 2018-12-04 | End: 2019-01-02 | Stop reason: DRUGHIGH

## 2018-12-04 RX ORDER — HYDROCHLOROTHIAZIDE 12.5 MG/1
12.5 TABLET ORAL DAILY
Qty: 30 TABLET | Refills: 0 | Status: CANCELLED | OUTPATIENT
Start: 2018-12-04

## 2018-12-04 NOTE — TELEPHONE ENCOUNTER
Prescription approved per [facility] [place].  Poppy Mims,Clinic Rn  [facility] [place]

## 2018-12-04 NOTE — TELEPHONE ENCOUNTER
"Requested Prescriptions   Pending Prescriptions Disp Refills     hydrochlorothiazide (HYDRODIURIL) 12.5 MG tablet  Last Written Prescription Date:  9/28/2018  Last Fill Quantity: 30 tabs,  # refills: 0   Last office visit: 7/23/2018 with prescribing provider:  Chapin   Future Office Visit:     30 tablet 0     Sig: Take 1 tablet (12.5 mg) by mouth daily    Diuretics (Including Combos) Protocol Passed    12/3/2018  5:52 PM       Passed - Blood pressure under 140/90 in past 12 months    BP Readings from Last 3 Encounters:   07/23/18 138/88   07/06/18 (!) 132/91   05/29/18 140/90                Passed - Recent (12 mo) or future (30 days) visit within the authorizing provider's specialty    Patient had office visit in the last 12 months or has a visit in the next 30 days with authorizing provider or within the authorizing provider's specialty.  See \"Patient Info\" tab in inbasket, or \"Choose Columns\" in Meds & Orders section of the refill encounter.             Passed - Patient is age 18 or older       Passed - No active pregancy on record       Passed - Normal serum creatinine on file in past 12 months    Recent Labs   Lab Test  07/23/18   0935   CR  1.00             Passed - Normal serum potassium on file in past 12 months    Recent Labs   Lab Test  07/23/18   0935   POTASSIUM  4.2                   Passed - Normal serum sodium on file in past 12 months    Recent Labs   Lab Test  07/23/18   0935   NA  141             Passed - No positive pregnancy test in past 12 months          "

## 2018-12-04 NOTE — TELEPHONE ENCOUNTER
"Pharmacy is requesting a 90-day supply.    Requested Prescriptions   Pending Prescriptions Disp Refills     hydrochlorothiazide (HYDRODIURIL) 12.5 MG tablet  Last Written Prescription Date:  12/4/2018  Last Fill Quantity: 30 tablet,  # refills: 0   Last office visit: 7/23/2018 with prescribing provider:  OLAF Samson   Future Office Visit:     30 tablet 0     Sig: Take 1 tablet (12.5 mg) by mouth daily    Diuretics (Including Combos) Protocol Passed    12/4/2018  3:39 PM       Passed - Blood pressure under 140/90 in past 12 months    BP Readings from Last 3 Encounters:   07/23/18 138/88   07/06/18 (!) 132/91   05/29/18 140/90          Passed - Recent (12 mo) or future (30 days) visit within the authorizing provider's specialty    Patient had office visit in the last 12 months or has a visit in the next 30 days with authorizing provider or within the authorizing provider's specialty.  See \"Patient Info\" tab in inbasket, or \"Choose Columns\" in Meds & Orders section of the refill encounter.           Passed - Patient is age 18 or older       Passed - No active pregancy on record       Passed - Normal serum creatinine on file in past 12 months    Recent Labs   Lab Test  07/23/18   0935   CR  1.00           Passed - Normal serum potassium on file in past 12 months    Recent Labs   Lab Test  07/23/18   0935   POTASSIUM  4.2           Passed - Normal serum sodium on file in past 12 months    Recent Labs   Lab Test  07/23/18   0935   NA  141           Passed - No positive pregnancy test in past 12 months            "

## 2018-12-05 NOTE — TELEPHONE ENCOUNTER
"Dr. Samson's last refill note from September:    \"Thanks for calling patient and getting the history. We need a visit for adjusting med  Continue hydrochlorothiazide  Bring in machine for bp  If she needs more hydrochlorothiazide, ok to refill until the visit. We will do labs as well.  Madhav Samson D.O.\"    Patient had scheduled for 10/15/18, but was a no-show.  Spoke with patient and assisted with scheduling HTN f/u for early January per patient request. She states she has enough of the hydrochlorothiazide to get her to this appointment.    Marni Parikh, RN    "

## 2019-01-02 ENCOUNTER — OFFICE VISIT (OUTPATIENT)
Dept: FAMILY MEDICINE | Facility: CLINIC | Age: 39
End: 2019-01-02
Payer: COMMERCIAL

## 2019-01-02 VITALS
WEIGHT: 183 LBS | BODY MASS INDEX: 29.41 KG/M2 | DIASTOLIC BLOOD PRESSURE: 90 MMHG | SYSTOLIC BLOOD PRESSURE: 139 MMHG | TEMPERATURE: 98.1 F | HEIGHT: 66 IN

## 2019-01-02 DIAGNOSIS — E66.811 CLASS 1 OBESITY WITHOUT SERIOUS COMORBIDITY WITH BODY MASS INDEX (BMI) OF 30.0 TO 30.9 IN ADULT, UNSPECIFIED OBESITY TYPE: Chronic | ICD-10-CM

## 2019-01-02 DIAGNOSIS — I10 BENIGN ESSENTIAL HYPERTENSION: ICD-10-CM

## 2019-01-02 DIAGNOSIS — L70.0 ACNE VULGARIS: Primary | ICD-10-CM

## 2019-01-02 PROCEDURE — 80048 BASIC METABOLIC PNL TOTAL CA: CPT | Performed by: FAMILY MEDICINE

## 2019-01-02 PROCEDURE — 99214 OFFICE O/P EST MOD 30 MIN: CPT | Performed by: FAMILY MEDICINE

## 2019-01-02 PROCEDURE — 36415 COLL VENOUS BLD VENIPUNCTURE: CPT | Performed by: FAMILY MEDICINE

## 2019-01-02 RX ORDER — CLINDAMYCIN PHOSPHATE 10 UG/ML
LOTION TOPICAL 2 TIMES DAILY
Qty: 60 ML | Refills: 3 | Status: SHIPPED | OUTPATIENT
Start: 2019-01-02 | End: 2020-01-02

## 2019-01-02 RX ORDER — HYDROCHLOROTHIAZIDE 25 MG/1
25 TABLET ORAL DAILY
Qty: 90 TABLET | Refills: 3 | Status: SHIPPED | OUTPATIENT
Start: 2019-01-02 | End: 2019-08-19

## 2019-01-02 RX ORDER — HYDROCHLOROTHIAZIDE 12.5 MG/1
12.5 TABLET ORAL DAILY
Qty: 30 TABLET | Refills: 0 | Status: CANCELLED | OUTPATIENT
Start: 2019-01-02

## 2019-01-02 ASSESSMENT — MIFFLIN-ST. JEOR: SCORE: 1528.89

## 2019-01-02 NOTE — PATIENT INSTRUCTIONS
Take hydrochlorothiazide 25mg daily.   Labs today. I will notify you of results when I receive them.    If blood pressure remain elevated above 140/80, notify me. I recommend starting propanolol at that time if blood pressures are continuing to be elevated.     I recommend arm cuff blood pressure machine.     Begin new acne creams. You may also use Retin-A sparingly as discussed today. Do not use Retin-A with other prescribed acne creams.     [facility]   Discharged by : Woo Vargas MA    Paper scripts provided to patient : none    If you have any questions regarding your visit please contact your care team:     Team Gold                Clinic Hours Telephone Number     Dr. Viry Schwab, CNP 7am-7pm  Monday - Thursday   7am-5pm  Fridays  [phone]   (Appointment scheduling available 24/7)     RN Line  [phone] option 2     Urgent Care - [place] and [place] - 11am-9pm Monday-Friday Saturday-Sunday- 9am-5pm     [place] -   5pm-9pm Monday-Friday Saturday-Sunday- 9am-5pm    [phone] - [place]    [phone] - [place]     For a Price Quote for your services, please call our Consumer Price Line at [phone].     What options do I have for visits at the clinic other than the traditional office visit?     To expand how we care for you, many of our providers are utilizing electronic visits (e-visits) and telephone visits, when medically appropriate, for interactions with their patients rather than a visit in the clinic. We also offer nurse visits for many medical concerns. Just like any other service, we will bill your insurance company for this type of visit based on time spent on the phone with your provider. Not all insurance companies cover these visits. Please check with your medical insurance if this type of visit is covered. You will be responsible for any charges that are not paid by  your insurance.   E-visits via Yebol: generally incur a $35.00 fee.     Telephone visits:  Time spent on the phone: *charged based on time that is spent on the phone in increments of 10 minutes. Estimated cost:   5-10 mins $30.00   11-20 mins. $59.00   21-30 mins. $85.00     Use A[employer]aryt (secure email communication and access to your chart) to send your primary care provider a message or make an appointment. Ask someone on your Team how to sign up for Yebol.     As always, Thank you for trusting us with your health care needs!    [facility]:    Scheduling Appointments  [facility]  Call: [phone]    [facility]  Call: [phone]    [facility]  Call: [phone]    For Gastroenterology referrals   [facility]   Clinics and Surgery Center, 4th Floor   [address] [place] [address]   Appointments: [phone]    WHERE TO GO FOR CARE?  Clinic    Make an appointment if you:       Are sick (cold, cough, flu, sore throat, earache or in pain).       Have a small injury (sprain, small cut, burn or broken bone).       Need a physical exam, Pap smear, vaccine or prescription refill.       Have questions about your health or medicines.    To reach us:      Call [phone] ([phone]). Open 24 hours every day. (For counseling services, call [phone].)    Log into Yebol at [email or web address]. (Visit [email or web address] to create an account.) Hospital emergency room    An emergency is a serious or life- threatening problem that must be treated right away.    Call [phone] or get to the hospital if you have:      Very bad or sudden:            - Chest pain or pressure         - Bleeding         - Head or belly pain         - Dizziness or trouble seeing, walking or                          Speaking      Problems breathing      Blood in your vomit or you are coughing up blood      A major  injury (knocked out, loss of a finger or limb, rape, broken bone protruding from skin)    A mental health crisis. (Or call the Mental Health Crisis line at 1-572.198.9343 or Suicide Prevention Hotline at 1-996.802.4223.)    Open 24 hours every day. You don't need an appointment.     Urgent care    Visit urgent care for sickness or small injuries when the clinic is closed. You don't need an appointment. To check hours or find an urgent care near you, visit [email or web address]. Online care    Get online care from [facility] for more than 70 common problems, like colds, allergies and infections. Open 24 hours every day at:   [email or web address]   Need help deciding?    For advice about where to be seen, you may call your clinic and ask to speak with a nurse. We're here for you 24 hours every day.         If you are deaf or hard of hearing, please let us know. We provide many free services including sign language interpreters, oral interpreters, TTYs, telephone amplifiers, note takers and written materials.

## 2019-01-02 NOTE — PROGRESS NOTES
SUBJECTIVE:   Bernie Lara is a 38 year old female who presents to clinic today for the following health issues:    Has not taken her medications yet today.   No abnormal vaginal discharge. She is loving lactobacillus supplement due to controlling symptoms well.     Hypertension Follow-up      Outpatient blood pressures are being checked at home.  Results are not good. Brought her cuff to appointment. Last night 160/100. This morning was bad alsop. Did a light workout today and felt a little dizzy and not right. Blood pressure was 123/80. Back up when she returned to work. 154/101 at lunchtime.     Low Salt Diet: low salt      Amount of exercise or physical activity: 2-3 days/week for an average of 30-45 minutes    Problems taking medications regularly: No    Medication side effects: none    Diet: low salt    Has wrist blood pressure machine. She purchased this last year.   Has history of PONV.     She stated that prior to the holidays, she experienced eye redness- eye became blood shot red and took two weeks to resolve. No associated eye vision changes. She reports that she was not taking hydrochlorothiazide regularly until eye redness symptoms due to fear it was associated with her blood pressure. Denies she was rubbing her eyes. Every since eye redness she began to take two tablets hydrochlorothiazide 12.5mg then was after few days increased to three tablets then increased to 4 tablets. She has been taking 4 tablets of hydrochlorothiazide 12.5mg since the past two days to attempt to lower blood pressures. She reports that although she is taking 4 tablets blood pressure is remaining to be in the 140s/90s.     She wears contacts, and has 6 months left of current prescription.     She is eating more salmon. No snoring. No daytime excessive sleepiness.       She also stated that she has been taking Asprin 81mg.     She endorses of having stress. She reports that for the past two nights she had excessive  "thoughts.       Does not believe she has anxiety or depression but voices of stress. She reports that she is functional however. Has not followed therapy. Reports that she \"snapped\" at her co worker. She does not want to take \"antidepresent\" medications.     Excessive thoughts occurs on a rare day.     Stress level waxes and wanes  Has more responsibility at work.   Anticipates an end to stressors.     Working out controls blood pressure and has found improvements to blood pressure being lower after exercise.       Stopped spironolactone due to not tolerating it.     Acne   clindamycin-benzoyl Per, Refr, 1.2-5 % GEL cream is expensive, but works. She did not like Retin-A due to adverse reaction after two days, but has Retin-A left at home.     Problem list and histories reviewed & adjusted, as indicated.  Additional history: as documented    Patient Active Problem List   Diagnosis     Acne     Hyperhidrosis of axilla     Obesity     CARDIOVASCULAR SCREENING; LDL GOAL LESS THAN 130     Melanocytic nevus     S/P Adjustable Band baraitric surgery      IUD (intrauterine device) in place     Abnormal mammogram     BMI 28.0-28.9,adult     Postsurgical nonabsorption     ASCUS of cervix with negative high risk HPV     Benign essential hypertension     Elevated serum creatinine     Past Surgical History:   Procedure Laterality Date     ENT SURGERY      surgery for displaced nose     GYN SURGERY           LAPAROSCOPIC GASTRIC ADJUSTABLE BANDING  2012    Procedure: LAPAROSCOPIC GASTRIC ADJUSTABLE BANDING;  LAPAROSCOPIC GASTRIC ADJUSTABLE BANDING ;  Surgeon: Sina Day MD;  Location: [facility] OR     MAMMOPLASTY REDUCTION BILATERAL         Social History     Tobacco Use     Smoking status: Never Smoker     Smokeless tobacco: Never Used     Tobacco comment: no second hand smoke   Substance Use Topics     Alcohol use: Yes     Comment: rare about 0-1 drink per month     Family History   Problem Relation Age of Onset     " Thyroid Disease Father      Diabetes Maternal Grandmother      Hypertension Maternal Grandmother      Breast Cancer Maternal Grandmother         late 60's     Diabetes Maternal Grandfather      Hypertension Maternal Grandfather      Thyroid Disease Paternal Grandmother      Thyroid Disease Sister      C.A.D. No family hx of      Cancer - colorectal No family hx of      Cerebrovascular Disease No family hx of      Asthma No family hx of          Current Outpatient Medications   Medication Sig Dispense Refill     Cholecalciferol (VITAMIN D) 2000 UNITS CAPS 1 capsules daily       clindamycin-benzoyl Per, Refr, 1.2-5 % GEL Apply topically At Bedtime 1 Tube 11     Cyanocobalamin (B-12) 1000 MCG CAPS as needed Once daily       fluticasone (FLONASE) 50 MCG/ACT spray Spray 1-2 sprays into both nostrils daily 1 Bottle 11     hydrochlorothiazide (HYDRODIURIL) 12.5 MG tablet Take 1 tablet (12.5 mg) by mouth daily 30 tablet 0     Lactobacillus (PROBIOTIC ACIDOPHILUS PO)        MIRENA 20 MCG/24HR IU IUD IUD       multivitamin  peds with iron (FLINTSTONES COMPLETE) 60 MG chewable tablet Take 1 chew tab by mouth daily.       Allergies   Allergen Reactions     Bactrim [Sulfamethoxazole W/Trimethoprim]      Seasonal Allergies      Sulfa Drugs      Recent Labs   Lab Test 07/23/18  0935 11/06/17  1022  04/18/17  1034  04/03/15  0955 11/20/14  1307  01/05/12  1035  04/25/11  1548   A1C  --   --   --   --   --   --   --   --  5.3  --   --    LDL 93  --   --  101*  --  59  --    < >  --   --   --    HDL 55  --   --  56  --  50*  --    < >  --   --   --    TRIG 73  --   --  92  --  74  --    < >  --   --   --    ALT  --   --   --   --   --  21 21  --   --   --  26   CR 1.00 0.92   < > 1.09*   < > 0.98 0.93   < >  --    < > 0.80   GFRESTIMATED 63 69   < > 57*  --  66 69   < >  --    < > 85   GFRESTBLACK 76 84   < > 69  --  79 84   < >  --    < > >90   POTASSIUM 4.2 4.0   < > 4.6   < > 4.2 4.1   < >  --    < > 3.7   TSH  --   --   --   --    "--  1.20 1.14   < > 1.69  --   --     < > = values in this interval not displayed.      BP Readings from Last 3 Encounters:   01/02/19 139/90   07/23/18 138/88   07/06/18 (!) 132/91    Wt Readings from Last 3 Encounters:   01/02/19 83 kg (183 lb)   07/23/18 86.2 kg (190 lb)   07/06/18 87.5 kg (193 lb)                  Labs reviewed in EPIC    Reviewed and updated as needed this visit by clinical staff       Reviewed and updated as needed this visit by Provider         ROS:  Constitutional, HEENT, cardiovascular, pulmonary, GI, , musculoskeletal, neuro, skin, endocrine and psych systems are negative, except as otherwise noted.    This document serves as a record of the services and decisions personally performed and made by Madhav Samson D.O. It was created on her behalf by Oswaldo Watson, a trained medical scribe. The creation of this document is based on the provider's statements to the medical scribe.  Oswaldo Watson January 2, 2019 3:03 PM     OBJECTIVE:     /90   Temp 98.1  F (36.7  C) (Oral)   Ht 1.664 m (5' 5.5\")   Wt 83 kg (183 lb)   BMI 29.99 kg/m    Body mass index is 29.99 kg/m .  GENERAL: alert, no distress and obese. She was swearing excessively.   NECK: no adenopathy, no asymmetry, masses, or scars and thyroid normal to palpation  RESP: lungs clear to auscultation - no rales, rhonchi or wheezes  CV: regular rate and rhythm, normal S1 S2, no S3 or S4, no murmur, click or rub, no peripheral edema and peripheral pulses strong  ABDOMEN: soft, nontender, no hepatosplenomegaly, no masses and bowel sounds normal  MS: no gross musculoskeletal defects noted, no edema  NEURO: Normal strength and tone, mentation intact and speech normal  PSYCH: mentation appears normal, affect normal/bright    Diagnostic Test Results:  none     ASSESSMENT/PLAN:         Declined flu shot.   Tobacco Cessation:   reports that  has never smoked. she has never used smokeless tobacco.      BMI:   Estimated body mass index is " "29.99 kg/m  as calculated from the following:    Height as of this encounter: 1.664 m (5' 5.5\").    Weight as of this encounter: 83 kg (183 lb).   Weight management plan: Discussed healthy diet and exercise guidelines        ICD-10-CM    1. Acne vulgaris L70.0 clindamycin (CLEOCIN T) 1 % external lotion     benzoyl peroxide 5 % external liquid   2. Benign essential hypertension I10 hydrochlorothiazide (HYDRODIURIL) 25 MG tablet     Basic metabolic panel   3. Class 1 obesity without serious comorbidity with body mass index (BMI) of 30.0 to 30.9 in adult, unspecified obesity type E66.9     Z68.30      Acne- Patient reports that she is running out of clindamycin-benzoyl GEL cream and is expensive. She has remaining Retin-A prescription at home, but reported that after two days she was experiencing adverse reaction on her face. Dicussed that this maybe due to over using Retin-A. Recommended using sparingly as needed. I prescribed her with two different acne creams to reduce expense. Advised patient to not use Retin-A with other prescribed acne creams.     Hypertension- patient reports that she has gradually increased dosages of hydrochlorothiazide 12.5mg from one to four tablets. She has been taking 4 tablets since the past couple days. She voices that the she was not taking hydrochlorothiazide on a regular basis since she experienced eye redness prior to the holidays. She voices of stress and of stressful situtions, not interested in therapy. She anticipates things getting better at work. Discussed propranolol, risks and benefits discussed. She declined at this time. Advised patient to take hydrochlorothiazide 25mg daily. If blood pressure persistently elevated above 140/80, she is to notify me and propanolol is highly recommended at that time. She is currently declining to start propanolol.     She is currently using a wrist blood pressure machine cuff, counseled patient that this type of blood pressure machine may " not be the most accurate, and recommended patient to purchase a arm blood pressure machine.        Advised and discussed healthy dieting, and recommended her to continue with physical activity. She has been losing weight. She has found that blood pressure are lower and improved after working out.       Patient instructions discussed with patient    The information in this document, created by the medical scribe for me, accurately reflects the services I personally performed and the decisions made by me. I have reviewed and approved this document for accuracy prior to leaving the patient care area.  January 2, 2019 3:51 PM     Madhav Samson DO  [facility]

## 2019-01-03 LAB
ANION GAP SERPL CALCULATED.3IONS-SCNC: 3 MMOL/L (ref 3–14)
BUN SERPL-MCNC: 21 MG/DL (ref 7–30)
CALCIUM SERPL-MCNC: 9 MG/DL (ref 8.5–10.1)
CHLORIDE SERPL-SCNC: 104 MMOL/L (ref 94–109)
CO2 SERPL-SCNC: 30 MMOL/L (ref 20–32)
CREAT SERPL-MCNC: 1.32 MG/DL (ref 0.52–1.04)
GFR SERPL CREATININE-BSD FRML MDRD: 52 ML/MIN/{1.73_M2}
GLUCOSE SERPL-MCNC: 95 MG/DL (ref 70–99)
POTASSIUM SERPL-SCNC: 4.1 MMOL/L (ref 3.4–5.3)
SODIUM SERPL-SCNC: 137 MMOL/L (ref 133–144)

## 2019-01-04 NOTE — RESULT ENCOUNTER NOTE
Your renal function is slightly abnormal, may because of takinig 4 pills of hydrochlorothiazide, please avoid NSAIDS(ibuprofen).  Recheck in 10 weeks  Take care.  Madhav Sasmon D.O

## 2019-01-07 ENCOUNTER — ALLIED HEALTH/NURSE VISIT (OUTPATIENT)
Dept: FAMILY MEDICINE | Facility: CLINIC | Age: 39
End: 2019-01-07

## 2019-01-07 ENCOUNTER — TELEPHONE (OUTPATIENT)
Dept: FAMILY MEDICINE | Facility: CLINIC | Age: 39
End: 2019-01-07

## 2019-01-07 VITALS — DIASTOLIC BLOOD PRESSURE: 92 MMHG | SYSTOLIC BLOOD PRESSURE: 126 MMHG | HEART RATE: 78 BPM

## 2019-01-07 DIAGNOSIS — I10 BENIGN ESSENTIAL HYPERTENSION: Primary | ICD-10-CM

## 2019-01-07 DIAGNOSIS — F41.9 ANXIETY: ICD-10-CM

## 2019-01-07 PROCEDURE — 99207 ZZC NO CHARGE NURSE ONLY: CPT | Performed by: FAMILY MEDICINE

## 2019-01-07 NOTE — PROGRESS NOTES
Benrie Lara is enrolled/participating in the retail pharmacy Blood Pressure Goals Achievement Program (BPGAP).  Bernie Lara was evaluated at [facility] on January 7, 2019 at which time her blood pressure was:    BP Readings from Last 3 Encounters:   01/07/19 (!) 126/92   01/02/19 139/90   07/23/18 138/88     Reviewed lifestyle modifications for blood pressure control and reduction: including making healthy food choices, managing weight, getting regular exercise, smoking cessation, reducing alcohol consumption, monitoring blood pressure regularly.     Bernie Lara is not experiencing symptoms.    Follow-Up: BP is not at goal of < 140/90mmHg (patient 18+ years of age with or without diabetes), Recommended follow-up with PCP.  Routing to PCP for further review.    Recommendation to Provider: Please contact patient to discuss plan.  Would like to know if she should come in to monitor one more time or if you want to move forward with adding additional medication.  Candidate for PGEN?     Bernie Lara was evaluated for enrollment into the PGEN study today.    PLEASE INITIATE ENROLLMENT DISCUSSION WITH HTN PTS  1) Between 30-80 years old                                                                                                               2) BMI between 19-50                                                                                                        3) BP ?140/90 AND ?170/110 patients aged 30-59         BP ?150/90 AND ?170/110 non-diabetic patients aged 60-80       BP ?140/90 AND ?170/110 diabetic patients aged 60-80  4) Additional requirements for uncontrolled HTN patients:        Pt on only 1 class of medication  5) EXCLUDE patient if confirmation of:                  ? Cardiac disease                  ? Chronic Kidney Disease                  ? Pregnancy/Breastfeeding                  ? Secondary Hypertension/Pre-eclampsia                                 ? Vascular  disease    Patient eligible for enrollment:  Yes  Patient interested in enrollment:  Unknown    This note completed by:     Brittnee Leo PharmD, [facility]  Pharmacist Manager   [facility]  [phone]

## 2019-01-07 NOTE — Clinical Note
Routing message to PCP for review -BP checked at pharmacy and noted to be above goal. Recommended patient follow-up with PCP.Please contact patient to discuss plan.  Would like to know if she should come in to monitor one more time or if you want to move forward with adding additional medication.  Candidate for PGEN?Brittnee RojasD, RPhPharmacist Manager [phone]

## 2019-01-07 NOTE — TELEPHONE ENCOUNTER
Reason for call:  Patient reporting a symptom    Symptom or request:   Patient was seen by Dr Samson last week and Dr Samson had wanted patient to call in if her blood pressure was higher than over 140/80.  Today patient was at chiropractor appointment and her blood pressure was 148/113. Dr Samson has mentioned starting her on a medication.  Patient would like to start that medication.  Please call patient once a script has been sent to her pharmacy so she knows that one has been sent.    Duration (how long have symptoms been present):       Have you been treated for this before? Yes    Additional comments:  Patient would the script to be sent to [facility]/ [place]/ [address]    Phone Number patient can be reached at:  Home number on file [phone] (home)    Best Time:  Any     Can we leave a detailed message on this number:  YES    Call taken on 1/7/2019 at 10:40 AM by Jeanna James

## 2019-01-07 NOTE — TELEPHONE ENCOUNTER
"Patient reports she had the blood pressure checked at chiropractor office today, which was after she had taken her morning medications and also after an adjustment.  She states she has not checked BP at home because her \"meter isn't working\".  She reports heart has felt a bit racing, but she hasn't checked her heart rate. No sensation of fluttering or skipping beats. She reports there could be an anxiety component. She denies chest pain, headaches, blurred vision, etc.  Instructed she should be seen right away for such sx, and she voices understanding.  Huddled with PCP, who would like to have patient's BP rechecked at the pharmacy prior to prescribing any new medications.  Patient will have BP checked at our pharmacy this afternoon and will notify us of the result.  Will leave encounter open for result, then may route to PCP to advise.    Marni Parikh RN    "

## 2019-01-08 NOTE — TELEPHONE ENCOUNTER
Patient calls RN VM. She states that she had her BP checked at pharmacy yesterday and is wondering if Dr. Samson wants to add a BP med. She can be reached at [phone].    Fan De La Rosa RN

## 2019-01-09 DIAGNOSIS — I10 BENIGN ESSENTIAL HYPERTENSION: ICD-10-CM

## 2019-01-09 RX ORDER — PROPRANOLOL HCL 60 MG
60 CAPSULE, EXTENDED RELEASE 24HR ORAL DAILY
Qty: 30 CAPSULE | Refills: 3 | Status: SHIPPED | OUTPATIENT
Start: 2019-01-09 | End: 2019-08-24

## 2019-01-09 NOTE — TELEPHONE ENCOUNTER
Called patient and provided message below as per Dr. Samson.  Patient verbalized understanding and states she will recheck at pharmacy in 1-2 weeks.    Tan Coon RN

## 2019-01-09 NOTE — TELEPHONE ENCOUNTER
"Requested Prescriptions   Pending Prescriptions Disp Refills     hydrochlorothiazide (HYDRODIURIL) 12.5 MG tablet [Pharmacy Med Name: HYDROCHLOROTHIAZIDE 12.5 MG TB]  Last Written Prescription Date:  1/2/2019  Last Fill Quantity: 90 tabs,  # refills: 3   Last office visit: 1/2/2019 with prescribing provider:  Chapin   Future Office Visit:     30 tablet 0     Sig: TAKE 1 TABLET BY MOUTH EVERY DAY    Diuretics (Including Combos) Protocol Failed - 1/9/2019  1:45 AM       Failed - Blood pressure under 140/90 in past 12 months    BP Readings from Last 3 Encounters:   01/07/19 (!) 126/92   01/02/19 139/90   07/23/18 138/88                Failed - Normal serum creatinine on file in past 12 months    Recent Labs   Lab Test 01/02/19  1532   CR 1.32*             Passed - Recent (12 mo) or future (30 days) visit within the authorizing provider's specialty    Patient had office visit in the last 12 months or has a visit in the next 30 days with authorizing provider or within the authorizing provider's specialty.  See \"Patient Info\" tab in inbasket, or \"Choose Columns\" in Meds & Orders section of the refill encounter.             Passed - Medication is active on med list       Passed - Patient is age 18 or older       Passed - No active pregancy on record       Passed - Normal serum potassium on file in past 12 months    Recent Labs   Lab Test 01/02/19  1532   POTASSIUM 4.1                   Passed - Normal serum sodium on file in past 12 months    Recent Labs   Lab Test 01/02/19  1532                Passed - No positive pregnancy test in past 12 months          "

## 2019-01-09 NOTE — TELEPHONE ENCOUNTER
Inderal sent.  She needs to start asap and come to pharmacy to check or RN visit in 1-2 weeks. We may need to increase geoff Samson D.O.

## 2019-01-10 NOTE — TELEPHONE ENCOUNTER
Unable to refill per RN protocol. B/P not less then 140/90. Creatinine 1.32  Will forward to provider for review.  Deidra Nassar RN

## 2019-01-11 RX ORDER — HYDROCHLOROTHIAZIDE 12.5 MG/1
TABLET ORAL
Qty: 30 TABLET | Refills: 0 | Status: SHIPPED | OUTPATIENT
Start: 2019-01-11 | End: 2019-07-22

## 2019-03-09 ENCOUNTER — HEALTH MAINTENANCE LETTER (OUTPATIENT)
Age: 39
End: 2019-03-09

## 2019-03-31 ENCOUNTER — VIRTUAL VISIT (OUTPATIENT)
Dept: FAMILY MEDICINE | Facility: OTHER | Age: 39
End: 2019-03-31

## 2019-03-31 NOTE — PROGRESS NOTES
"Date:   Clinician: Savanna Fontenot  Clinician NPI: [id number]  Patient: Bernie Croft  Patient : 1980  Patient Address: [address] [place] [address] [address], [place] [id number]  Patient Phone: [phone]  Visit Protocol: UTI  Patient Summary:  Bernie is a 38 year old ( : 1980 ) female who initiated a Visit for a presumed bladder infection. When asked the question \"Please sign me up to receive news, health information and promotions. \", Bernie responded \"No\".   Her symptoms started 4-6 days ago and consist of abdominal pain, urgency, nausea, dysuria, and urinary frequency.   Symptom details     Urine color: The color of her urine is yellow.     Abdominal pain: The pain is mild (1-3 on a 10 point pain scale).      Denied symptoms include vomiting, feeling as if the bladder is never empty, chills, vaginal itching, foul-smelling urine, flank pain, urinary incontinence, and vaginal discharge. She does not feel feverish.   Bernie has not used any over-the-counter medications or home remedies to relieve her current symptoms.  Precipitating events  Bernie denies having a sexually transmitted disease.  Pertinent medical history  Bernie has had a bladder infection before and has had 1 in the past 12 months. Her most recent bladder infection was not within the last 4 weeks. Her current symptoms are similar to her previous bladder infection symptoms.   Ciprofloxacin (Cipro) has been effective in treating her past bladder infections.   She has experienced problems or side effects with the following antibiotics in the past: sulfamethoxazole-trimethoprim (Bactrim DS).  Problems or side effects as reported by the patient (free text): Ramila Gibbs typically gets a yeast infection when she takes antibiotics. She has used fluconazole (Diflucan) to treat previous yeast infections. 2 doses of fluconazole (Diflucan) has typically been needed for symptoms to resolve in the past.  Bernie has not been prescribed " antibiotics to prevent frequent or repeated bladder infections in the past.   Bernie does not have a history of kidney stones. She has not used a catheter or been a patient in a hospital or nursing home in the past 2 weeks.   Bernie does not smoke or use smokeless tobacco.   She denies pregnancy and denies breastfeeding. She does not menstruate.   MEDICATIONS: propranolol-hydrochlorothiazide oral, ALLERGIES: Sulfa (Sulfonamide Antibiotics)  Clinician Response:  Dear Bernie,  Based on the information you have provided, you likely have an acute urinary tract infection, also called a bladder infection. Bladder infections occur when bacteria from the outside of the body enters the urinary tract. Any part of the urinary system can be infected, but the bladder is the most common.  Medication information  I am prescribing:     Nitrofurantoin monohyd/m-cryst (Macrobid) 100 mg oral capsule. Take 1 capsule by mouth every 12 hours for 5 days. Take this medication with food. There are no refills with this prescription.   The medication I prescribed for your bladder infection is an antibiotic. Continue taking the medication until it is gone even if you feel better.   Because you usually get a yeast infection when taking antibiotics, I am also prescribing:     Fluconazole (Diflucan) 150 mg oral tablet. Take 1 tablet by mouth in a single dose. Repeat dose in 3 days if symptoms are still present. There are no refills with this prescription.   Self care  Urination helps to flush bacteria from the urinary tract. For this reason, drinking water and urinating often helps relieve some symptoms of a bladder infection and can decrease your risk of getting bladder infections in the future.  Other steps you can take to prevent future bladder infections include:     Wipe front to back after using the bathroom    Urinate after sexual intercourse    Avoid using deodorant sprays, douches, or powders in the vaginal area     When to seek care   Please make an appointment to be seen in a clinic or urgent care if any of the following occur:     You develop new symptoms or your symptoms become worse    You have medication side effects that make it difficult to take them as prescribed    Your symptoms do not improve within 1-2 days of starting treatment    You have symptoms of a bladder infection that return shortly after completing treatment     It is possible to have an allergic reaction to an antibiotic even if you have not had one in the past. If you notice a new rash, significant swelling, or difficulty breathing, stop taking this medication immediately and go to a clinic or urgent care.   Diagnosis: Acute uncomplicated bladder infection  Diagnosis ICD: N39.0  Prescription: fluconazole (Diflucan) 150 mg oral tablet 2 tablet, 4 days supply. Take 1 tablet by mouth in a single dose, repeat dose in 3 days if symptoms are still present. Refills: 0, Refill as needed: no, Allow substitutions: yes  Prescription: nitrofurantoin monohyd/m-cryst (Macrobid) 100 mg oral capsule 10 capsule, 5 days supply. Take 1 capsule by mouth every 12 hours for 5 days. Refills: 0, Refill as needed: no, Allow substitutions: yes  Pharmacy: [id number] - [phone] - [address] [address]

## 2019-04-05 ENCOUNTER — VIRTUAL VISIT (OUTPATIENT)
Dept: FAMILY MEDICINE | Facility: OTHER | Age: 39
End: 2019-04-05

## 2019-04-05 NOTE — PROGRESS NOTES
"Date:   Clinician: Mike May  Clinician NPI: [id number]  Patient: Bernie Croft  Patient : 1980  Patient Address: [address] [place] [address] [address], [place] [id number]  Patient Phone: [phone]  Visit Protocol: URI  Patient Summary:  Bernie is a 38 year old ( : 1980 ) female who initiated a Visit for cold, sinus infection, or influenza. When asked the question \"Please sign me up to receive news, health information and promotions. \", Bernie responded \"No\".    Bernie states her symptoms started gradually 10-13 days ago. After her symptoms started, they improved and then got worse again.   Her symptoms consist of malaise, facial pain or pressure, a cough, rhinitis, tooth pain, nasal congestion, myalgia, enlarged lymph nodes, and a headache. She is experiencing mild difficulty breathing with activities but can speak normally in full sentences.   Symptom details     Nasal secretions: The color of her mucus is yellow and blood-tinged.    Cough: Bernie coughs a few times an hour and her cough is not more bothersome at night. Phlegm comes into her throat when she coughs. She believes the phlegm causes the cough. The color of the phlegm is yellow.     Facial pain or pressure: The facial pain or pressure feels worse when bending over or leaning forward.     Headache: She states the headache is severe (7-9 on a 10 point pain scale).     Tooth pain: The tooth pain is not caused by a cavity, recent dental work, or other mouth problems.      Bernie denies having fever, chills, wheezing, sore throat, and ear pain. She also denies taking antibiotic medication for the symptoms and having recent facial or sinus surgery in the past 60 days.   She has not recently been exposed to someone with influenza. Bernie has not been in close contact with any high risk individuals.   Bernie had 1 sinus infection within the past year.   Weight: 180 lbs   Bernie does not smoke or use smokeless tobacco.   She denies pregnancy " and denies breastfeeding. She does not menstruate.   MEDICATIONS: propranolol-hydrochlorothiazide oral, ALLERGIES: Sulfa (Sulfonamide Antibiotics)  Clinician Response:  Dear Bernie,  Based on the information provided, you have acute bacterial sinusitis, also known as a sinus infection. Sinus infections are caused by bacteria or a virus and symptoms are almost always identical. The difference between the 2 types of infections is timing.  Sinus infections start as viral infections and symptoms improve on their own in about 7 days. If symptoms have not improved after 7 days or have even worsened, a bacterial infection may have developed.  Medication information  I am prescribing:     Amoxicillin 500 mg oral tablet. Take 1 tablet by mouth every 8 hours for 10 days. There are no refills with this prescription.   Antibiotics can cause an allergic reaction even if you have taken them without a problem in the past. If you develop a new rash, swelling, or difficulty breathing, stop the medication and be seen in a clinic or urgent care immediately.  A yeast infection is a side effect of taking antibiotics in some women. Please use [facility] to get treatment if you have symptoms of a yeast infection.  If you become pregnant during this course of treatment, stop taking the medication and contact your primary care provider.  Self care  The following tips will keep you as comfortable as possible while you recover:     Rest    Drink plenty of water and other liquids    Take a hot shower to loosen congestion    Take a spoonful of honey to reduce your cough     When to seek care  Please be seen in a clinic or urgent care if any of the following occur:     Symptoms do not start to improve after 3 days of treatment    New symptoms develop, or symptoms become worse      Diagnosis: Acute bacterial sinusitis  Diagnosis ICD: J01.90  Prescription: amoxicillin 500 mg oral tablet 30 tablet, 10 days supply. Take 1 tablet by mouth every 8 hours  for 10 days. Refills: 0, Refill as needed: no, Allow substitutions: yes  Pharmacy: [facility] [id number] - [phone] - [address] [address]

## 2019-04-26 ENCOUNTER — VIRTUAL VISIT (OUTPATIENT)
Dept: FAMILY MEDICINE | Facility: OTHER | Age: 39
End: 2019-04-26

## 2019-04-26 NOTE — PROGRESS NOTES
"Date:   Clinician: Hortencia Shepard  Clinician NPI: [id number]  Patient: Bernie Croft  Patient : 1980  Patient Address: [address] [place] [address] [address], [place] [id number]  Patient Phone: [phone]  Visit Protocol: UTI  Patient Summary:  Bernie is a 38 year old ( : 1980 ) female who initiated a Visit for a presumed bladder infection. When asked the question \"Please sign me up to receive news, health information and promotions. \", Bernie responded \"No\".   Her symptoms started 1-3 days ago and consist of dysuria, feeling as if the bladder is never empty, urinary frequency, and urgency.   Symptom details   Urine color: The color of her urine is yellow.    Denied symptoms include vaginal itching, chills, vaginal discharge, foul-smelling urine, nausea, flank pain, abdominal pain, urinary incontinence, and vomiting. She does not feel feverish.   Bernie has not used any over-the-counter medications or home remedies to relieve her current symptoms.  Precipitating events  Bernie denies having a sexually transmitted disease.  Pertinent medical history  Bernie has had a bladder infection before and has had 1 in the past 12 months. Her most recent bladder infection was not within the last 4 weeks. Her current symptoms are similar to her previous bladder infection symptoms.   She is not sure what antibiotics have been effective in treating her past bladder infections.   She has experienced problems or side effects with the following antibiotics in the past: sulfamethoxazole-trimethoprim (Bactrim DS).  Problems or side effects as reported by the patient (free text): ame Gibbs typically gets a yeast infection when she takes antibiotics. She has used fluconazole (Diflucan) to treat previous yeast infections. 2 doses of fluconazole (Diflucan) has typically been needed for symptoms to resolve in the past.  Bernie has not been prescribed antibiotics to prevent frequent or repeated bladder infections in the " past.   Bernie does not have a history of kidney stones. She has not used a catheter or been a patient in a hospital or nursing home in the past 2 weeks.   Bernie does not smoke or use smokeless tobacco.   She denies pregnancy and denies breastfeeding. She does not menstruate.   MEDICATIONS: propranolol-hydrochlorothiazide oral, ALLERGIES: Sulfa (Sulfonamide Antibiotics)  Clinician Response:  Dear Bernie,  Based on the information you have provided, you likely have an acute urinary tract infection, also called a bladder infection. Bladder infections occur when bacteria from the outside of the body enters the urinary tract. Any part of the urinary system can be infected, but the bladder is the most common.  Medication information  I am prescribing:     Nitrofurantoin monohyd/m-cryst (Macrobid) 100 mg oral capsule. Take 1 capsule by mouth every 12 hours for 5 days. Take this medication with food. There are no refills with this prescription.   The medication I prescribed for your bladder infection is an antibiotic. Continue taking the medication until it is gone even if you feel better.   Because you usually get a yeast infection when taking antibiotics, I am also prescribing:     Fluconazole (Diflucan) 150 mg oral tablet. Take 1 tablet by mouth in a single dose. Repeat dose in 3 days if symptoms are still present. There are no refills with this prescription.   Self care  Urination helps to flush bacteria from the urinary tract. For this reason, drinking water and urinating often helps relieve some symptoms of a bladder infection and can decrease your risk of getting bladder infections in the future.  Other steps you can take to prevent future bladder infections include:     Wipe front to back after using the bathroom    Urinate after sexual intercourse    Avoid using deodorant sprays, douches, or powders in the vaginal area     When to seek care  Please make an appointment to be seen in a clinic or urgent care if any of  the following occur:     You develop new symptoms or your symptoms become worse    You have medication side effects that make it difficult to take them as prescribed    Your symptoms do not improve within 1-2 days of starting treatment    You have symptoms of a bladder infection that return shortly after completing treatment     It is possible to have an allergic reaction to an antibiotic even if you have not had one in the past. If you notice a new rash, significant swelling, or difficulty breathing, stop taking this medication immediately and go to a clinic or urgent care.   Diagnosis: Acute uncomplicated bladder infection  Diagnosis ICD: N39.0  Prescription: fluconazole (Diflucan) 150 mg oral tablet 2 tablet, 4 days supply. Take 1 tablet by mouth in a single dose, repeat dose in 3 days if symptoms are still present. Refills: 0, Refill as needed: no, Allow substitutions: yes  Prescription: nitrofurantoin monohyd/m-cryst (Macrobid) 100 mg oral capsule 10 capsule, 5 days supply. Take 1 capsule by mouth every 12 hours for 5 days. Refills: 0, Refill as needed: no, Allow substitutions: yes  Pharmacy: [facility] [id number] [facility] - [phone] - [address] [place] [address] [place], [place] [id number]

## 2019-06-27 ENCOUNTER — TELEPHONE (OUTPATIENT)
Dept: FAMILY MEDICINE | Facility: CLINIC | Age: 39
End: 2019-06-27

## 2019-06-27 NOTE — TELEPHONE ENCOUNTER
Reason for Call:  Other     Detailed comments: Patient would like to schedule for a mirena removal.    Phone Number Patient can be reached at: Home number on file [phone] (home)    Best Time:     Can we leave a detailed message on this number? YES    Call taken on 6/27/2019 at 11:31 AM by Shraddha Silvestre

## 2019-06-27 NOTE — TELEPHONE ENCOUNTER
Left voicemail for patient to call back TC line [phone].    Thank you,  Ange ONOFRE    NE Team Brittany

## 2019-07-22 ENCOUNTER — OFFICE VISIT (OUTPATIENT)
Dept: FAMILY MEDICINE | Facility: CLINIC | Age: 39
End: 2019-07-22

## 2019-07-22 VITALS
HEIGHT: 66 IN | SYSTOLIC BLOOD PRESSURE: 130 MMHG | TEMPERATURE: 98.4 F | DIASTOLIC BLOOD PRESSURE: 87 MMHG | WEIGHT: 190 LBS | BODY MASS INDEX: 30.53 KG/M2 | HEART RATE: 108 BPM

## 2019-07-22 DIAGNOSIS — Z13.220 SCREENING FOR HYPERLIPIDEMIA: Primary | ICD-10-CM

## 2019-07-22 DIAGNOSIS — Z30.430 ENCOUNTER FOR IUD INSERTION: ICD-10-CM

## 2019-07-22 DIAGNOSIS — Z30.432 ENCOUNTER FOR IUD REMOVAL: ICD-10-CM

## 2019-07-22 DIAGNOSIS — I10 BENIGN ESSENTIAL HYPERTENSION: ICD-10-CM

## 2019-07-22 PROCEDURE — 58301 REMOVE INTRAUTERINE DEVICE: CPT | Performed by: FAMILY MEDICINE

## 2019-07-22 PROCEDURE — 58300 INSERT INTRAUTERINE DEVICE: CPT | Performed by: FAMILY MEDICINE

## 2019-07-22 PROCEDURE — 99213 OFFICE O/P EST LOW 20 MIN: CPT | Mod: 25 | Performed by: FAMILY MEDICINE

## 2019-07-22 RX ORDER — HYDROCHLOROTHIAZIDE 12.5 MG/1
12.5 TABLET ORAL DAILY
Qty: 90 TABLET | Refills: 0 | Status: SHIPPED | OUTPATIENT
Start: 2019-07-22 | End: 2019-08-19

## 2019-07-22 ASSESSMENT — MIFFLIN-ST. JEOR: SCORE: 1555.64

## 2019-07-22 NOTE — PROGRESS NOTES
Subjective     Bernie Lara is a 39 year old female who presents to clinic today for the following health issues:    HPI   Hypertension Follow-up      Do you check your blood pressure regularly outside of the clinic? No     Are you following a low salt diet? Yes    Are your blood pressures ever more than 140 on the top number (systolic) OR more   than 90 on the bottom number (diastolic), for example 140/90? No    Amount of exercise or physical activity: 2-3 days/week for an average of 45-60 minutes    Problems taking medications regularly: No    Medication side effects: none    Diet: regular (no restrictions)       IUD  INSERTION PROCEDURE   Bernie Lara who presents for Mirena IUD insertion. Indication for IUD insertion is contraception. Patient's last menstrual period was No LMP recorded. (Menstrual status: IUD).. . The patient is currently using OCPs for contraception. She is in a monogamous sexual relationship.   Lab Results   Component Value Date    PAP ASC-US 05/05/2017      pregnancy test :not i ndicated  A complete discussion of the risks and benefits of IUD use and the details of the insertion procedure was held with the patient.   All questions were answered. A consent form was signed.   Prior to the beginning of the procedure the team paused to verify the patient's identity, as well as the procedure to be performed and the site. All equipment required was ready and available. The patient was positioned appropriately.   IUD Lot # [id number]  NDC # 66341-619-81 USE ONLY FOR MIRENA    The patient was placed in low lithotomy. A bimanual exam was performed and the uterus noted to be mid. A speculum was placed and the cervix swabbed with Betadine.iud removed easily with ring forcep  A tenaculum was placed on the anterior cervical lip. The uterus sounded to 7 cm. The Mirena IUD was placed to the uterine fundus without difficulty. The strings were cut to 3 cms. The tenaculum was removed and hemostasis was  ensured. The speculum was removed. The patient tolerated the procedure well.   PLAN:   The patient was asked to contact the clinic for any fever/chills/severe pelvic or abdominal pain or heavy bleeding. She was instructed in how to palpate her IUD strings.   FOLLOW-UP:   She was asked to follow up in one month for IUD check, and for her routine annual screening.                Patient Active Problem List   Diagnosis     Acne     Hyperhidrosis of axilla     Obesity     CARDIOVASCULAR SCREENING; LDL GOAL LESS THAN 130     Melanocytic nevus     S/P Adjustable Band baraitric surgery      IUD (intrauterine device) in place     Abnormal mammogram     BMI 28.0-28.9,adult     Postsurgical nonabsorption     ASCUS of cervix with negative high risk HPV     Benign essential hypertension     Elevated serum creatinine     Past Surgical History:   Procedure Laterality Date     ENT SURGERY      surgery for displaced nose     GYN SURGERY           LAPAROSCOPIC GASTRIC ADJUSTABLE BANDING  2012    Procedure: LAPAROSCOPIC GASTRIC ADJUSTABLE BANDING;  LAPAROSCOPIC GASTRIC ADJUSTABLE BANDING ;  Surgeon: Sina Day MD;  Location: [facility]     MAMMOPLASTY REDUCTION BILATERAL         Social History     Tobacco Use     Smoking status: Never Smoker     Smokeless tobacco: Never Used     Tobacco comment: no second hand smoke   Substance Use Topics     Alcohol use: Yes     Comment: rare about 0-1 drink per month     Family History   Problem Relation Age of Onset     Thyroid Disease Father      Diabetes Maternal Grandmother      Hypertension Maternal Grandmother      Breast Cancer Maternal Grandmother         late 60's     Diabetes Maternal Grandfather      Hypertension Maternal Grandfather      Thyroid Disease Paternal Grandmother      Thyroid Disease Sister      C.A.D. No family hx of      Cancer - colorectal No family hx of      Cerebrovascular Disease No family hx of      Asthma No family hx of            Reviewed and updated  "as needed this visit by Provider         Review of Systems   ROS COMP: Constitutional, HEENT, cardiovascular, pulmonary, GI, , musculoskeletal, neuro, skin, endocrine and psych systems are negative, except as otherwise noted.      Objective    /87   Pulse 108   Temp 98.4  F (36.9  C) (Oral)   Ht 1.664 m (5' 5.5\")   Wt 86.2 kg (190 lb)   BMI 31.14 kg/m    Body mass index is 31.14 kg/m .  Physical Exam   GENERAL: healthy, alert and no distress  NECK: no adenopathy, no asymmetry, masses, or scars and thyroid normal to palpation  RESP: lungs clear to auscultation - no rales, rhonchi or wheezes  CV: regular rate and rhythm, normal S1 S2, no S3 or S4, no murmur, click or rub, no peripheral edema and peripheral pulses strong    Diagnostic Test Results:  Labs reviewed in Epic        Assessment & Plan       ICD-10-CM    1. Screening for hyperlipidemia Z13.220    2. Benign essential hypertension I10 hydrochlorothiazide (HYDRODIURIL) 12.5 MG tablet   3. Encounter for IUD removal Z30.432    4. Encounter for IUD insertion Z30.430    htn-stable, did not take meds for few days, but took it today, restart, check labs at the next visit  iud replacement -recheck in one month     BMI:   Estimated body mass index is 31.14 kg/m  as calculated from the following:    Height as of this encounter: 1.664 m (5' 5.5\").    Weight as of this encounter: 86.2 kg (190 lb).   Weight management plan: Discussed healthy diet and exercise guidelines        See Patient Instructions    No follow-ups on file.    Madhav Samson DO  [facility]    "

## 2019-08-19 ENCOUNTER — OFFICE VISIT (OUTPATIENT)
Dept: FAMILY MEDICINE | Facility: CLINIC | Age: 39
End: 2019-08-19

## 2019-08-19 VITALS
TEMPERATURE: 98.2 F | DIASTOLIC BLOOD PRESSURE: 90 MMHG | SYSTOLIC BLOOD PRESSURE: 132 MMHG | WEIGHT: 194 LBS | BODY MASS INDEX: 31.79 KG/M2

## 2019-08-19 DIAGNOSIS — Z00.00 ROUTINE GENERAL MEDICAL EXAMINATION AT A HEALTH CARE FACILITY: Primary | ICD-10-CM

## 2019-08-19 DIAGNOSIS — Z12.31 VISIT FOR SCREENING MAMMOGRAM: ICD-10-CM

## 2019-08-19 DIAGNOSIS — I10 BENIGN ESSENTIAL HYPERTENSION: ICD-10-CM

## 2019-08-19 DIAGNOSIS — Z98.84 BARIATRIC SURGERY STATUS: Chronic | ICD-10-CM

## 2019-08-19 DIAGNOSIS — L70.0 ACNE VULGARIS: ICD-10-CM

## 2019-08-19 DIAGNOSIS — Z97.5 IUD (INTRAUTERINE DEVICE) IN PLACE: ICD-10-CM

## 2019-08-19 PROCEDURE — 99395 PREV VISIT EST AGE 18-39: CPT | Performed by: FAMILY MEDICINE

## 2019-08-19 PROCEDURE — 99213 OFFICE O/P EST LOW 20 MIN: CPT | Mod: 25 | Performed by: FAMILY MEDICINE

## 2019-08-19 RX ORDER — HYDROCHLOROTHIAZIDE 25 MG/1
25 TABLET ORAL DAILY
Qty: 90 TABLET | Refills: 3 | Status: SHIPPED | OUTPATIENT
Start: 2019-08-19 | End: 2021-08-02

## 2019-08-19 ASSESSMENT — ENCOUNTER SYMPTOMS
WEAKNESS: 0
NERVOUS/ANXIOUS: 0
HEADACHES: 0
DYSURIA: 0
DIZZINESS: 0
DIARRHEA: 0
FEVER: 0
PALPITATIONS: 0
JOINT SWELLING: 0
SHORTNESS OF BREATH: 0
HEARTBURN: 0
ARTHRALGIAS: 1
NAUSEA: 0
MYALGIAS: 1
FREQUENCY: 0
HEMATOCHEZIA: 0
SORE THROAT: 0
ABDOMINAL PAIN: 0
BREAST MASS: 0
EYE PAIN: 0
CONSTIPATION: 0
CHILLS: 0
COUGH: 0
PARESTHESIAS: 0
HEMATURIA: 0

## 2019-08-19 NOTE — PATIENT INSTRUCTIONS
Increase hydrochlorothiazide to 25mg. Stay at the same dose you are currently at for the propranalol medication. Please follow up in 2-4 weeks for a free blood pressure recheck visit with the medical assistant or nurse in order that we can make sure your blood pressure medications are working adequately and that you have been tolerating the medicine. In the meantime make some efforts with taking blood pressure at home and the goal for the first number (systolic) is <120. You can bring these readings with you at next visit and we will recheck labs at that time.     Try to see if you can find out your history with your mother so that we can update our records.       Preventive Health Recommendations  Female Ages 26 - 39  Yearly exam:   See your health care provider every year in order to    Review health changes.     Discuss preventive care.      Review your medicines if you your doctor has prescribed any.    Until age 30: Get a Pap test every three years (more often if you have had an abnormal result).    After age 30: Talk to your doctor about whether you should have a Pap test every 3 years or have a Pap test with HPV screening every 5 years.   You do not need a Pap test if your uterus was removed (hysterectomy) and you have not had cancer.  You should be tested each year for STDs (sexually transmitted diseases), if you're at risk.   Talk to your provider about how often to have your cholesterol checked.  If you are at risk for diabetes, you should have a diabetes test (fasting glucose).  Shots: Get a flu shot each year. Get a tetanus shot every 10 years.   Nutrition:     Eat at least 5 servings of fruits and vegetables each day.    Eat whole-grain bread, whole-wheat pasta and brown rice instead of white grains and rice.    Get adequate Calcium and Vitamin D.     Lifestyle    Exercise at least 150 minutes a week (30 minutes a day, 5 days of the week). This will help you control your weight and prevent  disease.    Limit alcohol to one drink per day.    No smoking.     Wear sunscreen to prevent skin cancer.    See your dentist every six months for an exam and cleaning.    [facility]   Discharged by : Krystian Sanchez CMA on 8/19/2019 at 9:14 AM    Paper scripts provided to patient :      If you have any questions regarding your visit please contact your care team:     Team Gold                Clinic Hours Telephone Number     Dr. Viry Schwab, CNP 7am-7pm  Monday - Thursday   7am-5pm  Fridays  [phone]   (Appointment scheduling available 24/7)     RN Line  [phone] option 2     Urgent Care - [place] - 11am-9pm Monday-Friday Saturday-Sunday- 9am-5pm     [place] -   5pm-9pm Monday-Friday Saturday-Sunday- 9am-5pm    [phone] - [place]    [phone] - [place]     For a Price Quote for your services, please call our Consumer Price Line at [phone].     What options do I have for visits at the clinic other than the traditional office visit?     To expand how we care for you, many of our providers are utilizing electronic visits (e-visits) and telephone visits, when medically appropriate, for interactions with their patients rather than a visit in the clinic. We also offer nurse visits for many medical concerns. Just like any other service, we will bill your insurance company for this type of visit based on time spent on the phone with your provider. Not all insurance companies cover these visits. Please check with your medical insurance if this type of visit is covered. You will be responsible for any charges that are not paid by your insurance.   E-visits via [employer]: generally incur a $45.00 fee.     Telephone visits:  Time spent on the phone: *charged based on time that is spent on the phone in increments of 10 minutes. Estimated cost:   5-10 mins $30.00   11-20 mins. $59.00   21-30 mins. $85.00      Use [employer] (secure email communication and access to your chart) to send your primary care provider a message or make an appointment. Ask someone on your Team how to sign up for [employer].     As always, Thank you for trusting us with your health care needs!    [facility]:    Scheduling Appointments  [facility]  Call: [phone]    [facility]  Call: [phone]    [facility]  Call: [phone]    For Gastroenterology referrals   [facility]   [facility] and [facility] Center, 4th Floor   [address] [place] [address]   Appointments: [phone]    WHERE TO GO FOR CARE?  Clinic    Make an appointment if you:       Are sick (cold, cough, flu, sore throat, earache or in pain).       Have a small injury (sprain, small cut, burn or broken bone).       Need a physical exam, Pap smear, vaccine or prescription refill.       Have questions about your health or medicines.    To reach us:      Call [phone] ([phone]). Open 24 hours every day. (For counseling services, call [phone].)    Log into [employer] at [email or web address]. (Visit [email or web address] to create an account.) Hospital emergency room    An emergency is a serious or life- threatening problem that must be treated right away.    Call 653 or get to the hospital if you have:      Very bad or sudden:            - Chest pain or pressure         - Bleeding         - Head or belly pain         - Dizziness or trouble seeing, walking or                          Speaking      Problems breathing      Blood in your vomit or you are coughing up blood      A major injury (knocked out, loss of a finger or limb, rape, broken bone protruding from skin)    A mental health crisis. (Or call the Mental Health Crisis line at 1-450.280.1016 or Suicide Prevention Hotline at 1-359.875.7315.)    Open 24 hours every day. You don't need an  appointment.     Urgent care    Visit urgent care for sickness or small injuries when the clinic is closed. You don't need an appointment. To check hours or find an urgent care near you, visit [email or web address]. Online care    Get online care from [facility] for more than 70 common problems, like colds, allergies and infections. Open 24 hours every day at:   [email or web address]   Need help deciding?    For advice about where to be seen, you may call your clinic and ask to speak with a nurse. We're here for you 24 hours every day.         If you are deaf or hard of hearing, please let us know. We provide many free services including sign language interpreters, oral interpreters, TTYs, telephone amplifiers, note takers and written materials.

## 2019-08-19 NOTE — PROGRESS NOTES
SUBJECTIVE:   CC: Bernie Lara is an 39 year old woman who presents for preventive health visit.     Healthy Habits:     Getting at least 3 servings of Calcium per day:  Yes    Bi-annual eye exam:  Yes    Dental care twice a year:  Yes    Sleep apnea or symptoms of sleep apnea:  None    Diet:  Regular (no restrictions)    Frequency of exercise:  2-3 days/week    Duration of exercise:  45-60 minutes    Taking medications regularly:  No    Barriers to taking medications:  Not applicable    Medication side effects:  None    PHQ-2 Total Score: 0    Additional concerns today:  No    Came fasting    IUD check. She had some bleeding the first few days. This is her 3rd IUD and has not been having periods.     Hip and Shoulder Pain  She mentions that she has some hip pain. She believes that she has problems with the shoulder. She mentions that she has been participating in a boot camp which includes: boxing, crawling, jumping jacks. She stopped this exercise regimen for some time because of pain felt with these exercises and it seems to persist despite weeks/ months of rest. She has not had physical therapy for either the hip or shoulder.     Hypertension   Denies symptoms of dizziness, shortness of breath, chest pain with use of blood pressure medications. She has a home blood pressure cuff and she believes that this is not working well due to the cheapness of the device. Our readings here indicate that blood pressure is still a little elevated and we discussed changes to her medications.     BP Readings from Last 6 Encounters:   08/19/19 (!) 132/90   07/22/19 130/87   01/07/19 (!) 126/92   01/02/19 139/90   07/23/18 138/88   07/06/18 (!) 132/91       Today's PHQ-2 Score:   PHQ-2 ( 1999 [employer]) 8/19/2019   Q1: Little interest or pleasure in doing things 0   Q2: Feeling down, depressed or hopeless 0   PHQ-2 Score 0   Q1: Little interest or pleasure in doing things Not at all   Q2: Feeling down, depressed or hopeless  Not at all   PHQ-2 Score 0       Abuse: Current or Past(Physical, Sexual or Emotional)- No  Do you feel safe in your environment? Yes    Social History     Tobacco Use     Smoking status: Never Smoker     Smokeless tobacco: Never Used     Tobacco comment: no second hand smoke   Substance Use Topics     Alcohol use: Yes     Comment: rare about 0-1 drink per month     If you drink alcohol do you typically have >3 drinks per day or >7 drinks per week? Yes      Alcohol Use 2019   Prescreen: >3 drinks/day or >7 drinks/week? No   Prescreen: >3 drinks/day or >7 drinks/week? -   No flowsheet data found.    Reviewed orders with patient.  Reviewed health maintenance and updated orders accordingly - Yes  Labs reviewed in EPIC  BP Readings from Last 3 Encounters:   19 (!) 132/90   19 130/87   19 (!) 126/92    Wt Readings from Last 3 Encounters:   19 88 kg (194 lb)   19 86.2 kg (190 lb)   19 83 kg (183 lb)           Patient Active Problem List   Diagnosis     Acne     Hyperhidrosis of axilla     Obesity     CARDIOVASCULAR SCREENING; LDL GOAL LESS THAN 130     Melanocytic nevus     S/P Adjustable Band baraitric surgery      IUD (intrauterine device) in place     Abnormal mammogram     BMI 28.0-28.9,adult     Postsurgical nonabsorption     ASCUS of cervix with negative high risk HPV     Benign essential hypertension     Elevated serum creatinine     Past Surgical History:   Procedure Laterality Date     ENT SURGERY      surgery for displaced nose     GYN SURGERY           LAPAROSCOPIC GASTRIC ADJUSTABLE BANDING  2012    Procedure: LAPAROSCOPIC GASTRIC ADJUSTABLE BANDING;  LAPAROSCOPIC GASTRIC ADJUSTABLE BANDING ;  Surgeon: Sina Day MD;  Location: [facility] OR     MAMMOPLASTY REDUCTION BILATERAL         Social History     Tobacco Use     Smoking status: Never Smoker     Smokeless tobacco: Never Used     Tobacco comment: no second hand smoke   Substance Use Topics     Alcohol  use: Yes     Comment: rare about 0-1 drink per month     Family History   Problem Relation Age of Onset     Hypertension Mother      Thyroid Disease Father      Diabetes Maternal Grandmother      Hypertension Maternal Grandmother      Breast Cancer Maternal Grandmother         late 60's/70's     Diabetes Maternal Grandfather      Hypertension Maternal Grandfather      Thyroid Disease Paternal Grandmother      Thyroid Disease Sister      C.A.D. No family hx of      Cancer - colorectal No family hx of      Cerebrovascular Disease No family hx of      Asthma No family hx of          Current Outpatient Medications   Medication Sig Dispense Refill     benzoyl peroxide 5 % external liquid Use  g 3     clindamycin (CLEOCIN T) 1 % external lotion Apply topically 2 times daily 60 mL 3     hydrochlorothiazide (HYDRODIURIL) 12.5 MG tablet Take 1 tablet (12.5 mg) by mouth daily 90 tablet 0     MIRENA 20 MCG/24HR IU IUD IUD       propranolol ER (INDERAL LA) 60 MG 24 hr capsule Take 1 capsule (60 mg) by mouth daily 30 capsule 3     hydrochlorothiazide (HYDRODIURIL) 25 MG tablet Take 1 tablet (25 mg) by mouth daily (Patient not taking: Reported on 7/22/2019) 90 tablet 3     levonorgestrel (MIRENA) 20 MCG/24HR IUD 1 each (20 mcg) by Intrauterine route once for 1 dose 1 each 0       Alternate mammogram schedule due to breast cancer history/ abnormal breast findings. Following every 6 months recommended at this time.     Pertinent mammograms are reviewed under the imaging tab.  History of abnormal Pap smear: NO - age 30-65 PAP every 5 years with negative HPV co-testing recommended  PAP / HPV Latest Ref Rng & Units 5/5/2017 1/27/2014 11/9/2010   PAP - ASC-US(A) NIL NIL   HPV 16 DNA NEG Negative - -   HPV 18 DNA NEG Negative - -   OTHER HR HPV NEG Negative - -     Reviewed and updated as needed this visit by clinical staff  Tobacco  Allergies  Meds  Med Hx  Surg Hx  Fam Hx  Soc Hx        Reviewed and updated as needed this  visit by Provider        Past Medical History:   Diagnosis Date     ABNORMAL PAP SMEAR OF CERVIX NEC AND HPV 2006 required cryotherapy     ASCUS of cervix with negative high risk HPV 2017     Hypertension      PONV (postoperative nausea and vomiting)       Past Surgical History:   Procedure Laterality Date     ENT SURGERY      surgery for displaced nose     GYN SURGERY           LAPAROSCOPIC GASTRIC ADJUSTABLE BANDING  2012    Procedure: LAPAROSCOPIC GASTRIC ADJUSTABLE BANDING;  LAPAROSCOPIC GASTRIC ADJUSTABLE BANDING ;  Surgeon: Sina Day MD;  Location: [facility] OR     MAMMOPLASTY REDUCTION BILATERAL       OB History    Para Term  AB Living   1 1 0 0 0 1   SAB TAB Ectopic Multiple Live Births   0 0 0 0 0      # Outcome Date GA Lbr Sid/2nd Weight Sex Delivery Anes PTL Lv   1 Para                Review of Systems   Constitutional: Negative for chills and fever.   HENT: Negative for congestion, ear pain, hearing loss and sore throat.    Eyes: Negative for pain and visual disturbance.   Respiratory: Negative for cough and shortness of breath.    Cardiovascular: Negative for chest pain, palpitations and peripheral edema.   Gastrointestinal: Negative for abdominal pain, constipation, diarrhea, heartburn, hematochezia and nausea.   Breasts:  Negative for tenderness, breast mass and discharge.   Genitourinary: Negative for dysuria, frequency, genital sores, hematuria, pelvic pain, urgency, vaginal bleeding and vaginal discharge.   Musculoskeletal: Positive for arthralgias and myalgias. Negative for joint swelling.   Skin: Negative for rash.   Neurological: Negative for dizziness, weakness, headaches and paresthesias.   Psychiatric/Behavioral: Negative for mood changes. The patient is not nervous/anxious.      This document serves as a record of the services and decisions personally performed and made by Madhav Samson DO . It was created on her behalf by Ty  Lizett, a trained medical scribe. The creation of this document is based on the provider's statements to the medical scribe.  Ty Lizett 9:06 AM August 19, 2019     OBJECTIVE:   BP (!) 132/90 (BP Location: Right arm, Patient Position: Sitting, Cuff Size: Adult Regular)   Temp 98.2  F (36.8  C) (Oral)   Wt 88 kg (194 lb)   BMI 31.79 kg/m    Physical Exam  GENERAL: healthy, alert and no distress  EYES: Eyes grossly normal to inspection, PERRL and conjunctivae and sclerae normal  HENT: ear canals and TM's normal, nose and mouth without ulcers or lesions  NECK: no adenopathy, no asymmetry, masses, or scars and thyroid normal to palpation  RESP: lungs clear to auscultation - no rales, rhonchi or wheezes  BREAST: normal without masses, tenderness or nipple discharge and no palpable axillary masses or adenopathy  CV: regular rate and rhythm, normal S1 S2, no S3 or S4, no murmur, click or rub, no peripheral edema and peripheral pulses strong  ABDOMEN: soft, nontender, no hepatosplenomegaly, no masses and bowel sounds normal   : normal external genatalia cervix normal, iud strings   MS: no gross musculoskeletal defects noted, no edema  SKIN: no suspicious lesions or rashes to visible skin   NEURO: Normal strength and tone, mentation intact and speech normal  PSYCH: mentation appears normal, affect normal/bright    Diagnostic Test Results:  Labs reviewed in Epic  No results found for this or any previous visit (from the past 24 hour(s)).    ASSESSMENT/PLAN:   1. Routine general medical examination at a health care facility  Normal exam     2. Benign essential hypertension  Elevated. Recommended increase of hydrochlorothiazide to 25 mg and recheck blood pressure in 3-4 weeks.  - hydrochlorothiazide (HYDRODIURIL) 25 MG tablet; Take 1 tablet (25 mg) by mouth daily  Dispense: 90 tablet; Refill: 3    3. Visit for screening mammogram    - MA Screening Digital Bilateral; Future    4. IUD (intrauterine device) in place  In  "place    5. Acne vulgaris  Stable, cont meds    6. BMI 28.0-28.9,adult  Lifestyle changes advised    7. S/P Adjustable Band baraitric surgery   Advised vitamins      COUNSELING:  Reviewed preventive health counseling, as reflected in patient instructions  Special attention given to:        Regular exercise       Healthy diet/nutrition       Contraception    Estimated body mass index is 31.79 kg/m  as calculated from the following:    Height as of 7/22/19: 1.664 m (5' 5.5\").    Weight as of this encounter: 88 kg (194 lb).    Weight management plan: Discussed healthy diet and exercise guidelines     reports that she has never smoked. She has never used smokeless tobacco.      Counseling Resources:  ATP IV Guidelines  Pooled Cohorts Equation Calculator  Breast Cancer Risk Calculator  FRAX Risk Assessment  ICSI Preventive Guidelines  Dietary Guidelines for Americans, 2010  USDA's MyPlate  ASA Prophylaxis  Lung CA Screening    Madhav Samson DO  [facility]  "

## 2019-08-21 ENCOUNTER — ANCILLARY PROCEDURE (OUTPATIENT)
Dept: MAMMOGRAPHY | Facility: CLINIC | Age: 39
End: 2019-08-21
Attending: FAMILY MEDICINE

## 2019-08-21 DIAGNOSIS — Z12.31 VISIT FOR SCREENING MAMMOGRAM: ICD-10-CM

## 2019-08-21 PROCEDURE — G0279 TOMOSYNTHESIS, MAMMO: HCPCS

## 2019-08-21 PROCEDURE — 77066 DX MAMMO INCL CAD BI: CPT

## 2019-08-24 ENCOUNTER — TELEPHONE (OUTPATIENT)
Dept: FAMILY MEDICINE | Facility: CLINIC | Age: 39
End: 2019-08-24

## 2019-08-24 DIAGNOSIS — F41.9 ANXIETY: ICD-10-CM

## 2019-08-24 DIAGNOSIS — I10 BENIGN ESSENTIAL HYPERTENSION: ICD-10-CM

## 2019-08-28 RX ORDER — PROPRANOLOL HCL 60 MG
CAPSULE, EXTENDED RELEASE 24HR ORAL
Qty: 30 CAPSULE | Refills: 0 | Status: SHIPPED | OUTPATIENT
Start: 2019-08-28 | End: 2019-09-10

## 2019-09-03 NOTE — RESULT ENCOUNTER NOTE
Mammo results managed by the breast center. Results communicated to the patient by their office.   Madhav Samson D.O.

## 2019-09-10 ENCOUNTER — OFFICE VISIT (OUTPATIENT)
Dept: FAMILY MEDICINE | Facility: CLINIC | Age: 39
End: 2019-09-10

## 2019-09-10 VITALS
SYSTOLIC BLOOD PRESSURE: 118 MMHG | TEMPERATURE: 98.2 F | DIASTOLIC BLOOD PRESSURE: 68 MMHG | HEIGHT: 66 IN | HEART RATE: 78 BPM | BODY MASS INDEX: 31.02 KG/M2 | WEIGHT: 193 LBS

## 2019-09-10 DIAGNOSIS — F41.9 ANXIETY: ICD-10-CM

## 2019-09-10 DIAGNOSIS — I10 BENIGN ESSENTIAL HYPERTENSION: ICD-10-CM

## 2019-09-10 DIAGNOSIS — R74.8 ABNORMAL CREATINE KINASE LEVEL: Primary | ICD-10-CM

## 2019-09-10 LAB — HGB BLD-MCNC: 14.4 G/DL (ref 11.7–15.7)

## 2019-09-10 PROCEDURE — 36415 COLL VENOUS BLD VENIPUNCTURE: CPT | Performed by: FAMILY MEDICINE

## 2019-09-10 PROCEDURE — 80048 BASIC METABOLIC PNL TOTAL CA: CPT | Performed by: FAMILY MEDICINE

## 2019-09-10 PROCEDURE — 85018 HEMOGLOBIN: CPT | Performed by: FAMILY MEDICINE

## 2019-09-10 PROCEDURE — 99214 OFFICE O/P EST MOD 30 MIN: CPT | Performed by: FAMILY MEDICINE

## 2019-09-10 RX ORDER — PROPRANOLOL HCL 60 MG
CAPSULE, EXTENDED RELEASE 24HR ORAL
Qty: 90 CAPSULE | Refills: 1 | Status: SHIPPED | OUTPATIENT
Start: 2019-09-10 | End: 2020-06-10

## 2019-09-10 ASSESSMENT — MIFFLIN-ST. JEOR: SCORE: 1569.25

## 2019-09-10 NOTE — PROGRESS NOTES
Subjective     Bernie Lara is a 39 year old female who presents to clinic today for the following health issues:    HPI   Hypertension Follow-up      Do you check your blood pressure regularly outside of the clinic? No     Are you following a low salt diet? Yes    Are your blood pressures ever more than 140 on the top number (systolic) OR more   than 90 on the bottom number (diastolic), for example 140/90? Not checking       How many servings of fruits and vegetables do you eat daily?  2-3, with each meal but breakfast    On average, how many sweetened beverages do you drink each day (soda, juice, sweet tea, etc)?   2    How many days per week do you miss taking your medication? 0    Taking 2 meds for bp    She is doing well on 2 meds, no chest pain, no dizziness, no sob  She monitors diet  She does not use salt, dose not check home bp  Reviewed labs and  CKD            Patient Active Problem List   Diagnosis     Acne     Hyperhidrosis of axilla     Obesity     CARDIOVASCULAR SCREENING; LDL GOAL LESS THAN 130     Melanocytic nevus     S/P Adjustable Band baraitric surgery      IUD (intrauterine device) in place     Abnormal mammogram     BMI 28.0-28.9,adult     Postsurgical nonabsorption     ASCUS of cervix with negative high risk HPV     Benign essential hypertension     Elevated serum creatinine     Past Surgical History:   Procedure Laterality Date     ENT SURGERY      surgery for displaced nose     GYN SURGERY           LAPAROSCOPIC GASTRIC ADJUSTABLE BANDING  2012    Procedure: LAPAROSCOPIC GASTRIC ADJUSTABLE BANDING;  LAPAROSCOPIC GASTRIC ADJUSTABLE BANDING ;  Surgeon: Sina Day MD;  Location: [facility]     MAMMOPLASTY REDUCTION BILATERAL         Social History     Tobacco Use     Smoking status: Never Smoker     Smokeless tobacco: Never Used     Tobacco comment: no second hand smoke   Substance Use Topics     Alcohol use: Yes     Comment: rare about 0-1 drink per month     Family History  "  Problem Relation Age of Onset     Hypertension Mother      Thyroid Disease Father      Diabetes Maternal Grandmother      Hypertension Maternal Grandmother      Breast Cancer Maternal Grandmother         late 60's/70's     Diabetes Maternal Grandfather      Hypertension Maternal Grandfather      Thyroid Disease Paternal Grandmother      Thyroid Disease Sister      C.A.D. No family hx of      Cancer - colorectal No family hx of      Cerebrovascular Disease No family hx of      Asthma No family hx of            Reviewed and updated as needed this visit by Provider         Review of Systems   ROS COMP: Constitutional, HEENT, cardiovascular, pulmonary, GI, , musculoskeletal, neuro, skin, endocrine and psych systems are negative, except as otherwise noted.      Objective    /68   Pulse 78   Temp 98.2  F (36.8  C) (Oral)   Ht 1.664 m (5' 5.5\")   Wt 87.5 kg (193 lb)   BMI 31.63 kg/m    Body mass index is 31.63 kg/m .  Physical Exam   GENERAL: healthy, alert and no distress  EYES: Eyes grossly normal to inspection, PERRL and conjunctivae and sclerae normal  HENT: ear canals and TM's normal, nose and mouth without ulcers or lesions  NECK: no adenopathy, no asymmetry, masses, or scars and thyroid normal to palpation  RESP: lungs clear to auscultation - no rales, rhonchi or wheezes  BREAST: normal without masses, tenderness or nipple discharge and no palpable axillary masses or adenopathy  CV: regular rate and rhythm, normal S1 S2, no S3 or S4, no murmur, click or rub, no peripheral edema and peripheral pulses strong  ABDOMEN: soft, nontender, no hepatosplenomegaly, no masses and bowel sounds normal  MS: no gross musculoskeletal defects noted, no edema  SKIN: no suspicious lesions or rashes  NEURO: Normal strength and tone, mentation intact and speech normal  PSYCH: mentation appears normal, affect normal/bright    Diagnostic Test Results:  Labs reviewed in Epic  No results found for this or any previous visit " "(from the past 24 hour(s)).        Assessment & Plan       ICD-10-CM    1. Abnormal creatine kinase level R74.8    2. Benign essential hypertension I10 propranolol ER (INDERAL LA) 60 MG 24 hr capsule     Basic metabolic panel     Hemoglobin   3. Anxiety F41.9 propranolol ER (INDERAL LA) 60 MG 24 hr capsule     Basic metabolic panel   history of elevated bp's in the past, reviewed the need for tight control, she is now following up with recommendation, avoid nsaids, recheck here in 3 months, check home bp's  Anxiety-better with inderal  Abnormal kidney function-has seen renal in the past and has told her to control bp's, avoid nsaids, consider ace I if done with pregnancy, check albumin at the next visit, she reports she cant not leave urine           BMI:   Estimated body mass index is 31.63 kg/m  as calculated from the following:    Height as of this encounter: 1.664 m (5' 5.5\").    Weight as of this encounter: 87.5 kg (193 lb).   Weight management plan: Discussed healthy diet and exercise guidelines        See Patient Instructions    No follow-ups on file.    Madhav Samson DO  [facility]    "

## 2019-09-10 NOTE — PATIENT INSTRUCTIONS
Continue current meds  Follow up in 3 months   Call back if BP is elevated 130/90 -greater  We can increase inderal 80mg daily  Madhav Samson D.O.

## 2019-09-11 LAB
ANION GAP SERPL CALCULATED.3IONS-SCNC: 3 MMOL/L (ref 3–14)
BUN SERPL-MCNC: 20 MG/DL (ref 7–30)
CALCIUM SERPL-MCNC: 9 MG/DL (ref 8.5–10.1)
CHLORIDE SERPL-SCNC: 106 MMOL/L (ref 94–109)
CO2 SERPL-SCNC: 29 MMOL/L (ref 20–32)
CREAT SERPL-MCNC: 1.12 MG/DL (ref 0.52–1.04)
GFR SERPL CREATININE-BSD FRML MDRD: 63 ML/MIN/{1.73_M2}
GLUCOSE SERPL-MCNC: 93 MG/DL (ref 70–99)
POTASSIUM SERPL-SCNC: 3.9 MMOL/L (ref 3.4–5.3)
SODIUM SERPL-SCNC: 138 MMOL/L (ref 133–144)

## 2019-11-05 ENCOUNTER — HEALTH MAINTENANCE LETTER (OUTPATIENT)
Age: 39
End: 2019-11-05

## 2019-11-18 ENCOUNTER — TELEPHONE (OUTPATIENT)
Dept: FAMILY MEDICINE | Facility: CLINIC | Age: 39
End: 2019-11-18

## 2019-11-18 NOTE — TELEPHONE ENCOUNTER
Panel Management Review      Patient has the following on her problem list:     Hypertension   Last three blood pressure readings:  BP Readings from Last 3 Encounters:   09/10/19 118/68   08/19/19 (!) 132/90   07/22/19 130/87     Blood pressure: MONITOR    HTN Guidelines:  Less than 140/90      Composite cancer screening  Chart review shows that this patient is due/due soon for the following None  Summary:    Patient is due/failing the following:   BP CHECK and FOLLOW UP    Action needed:   Patient needs office visit for BP Check .    Type of outreach:    Sent [email or web address] message.    Questions for provider review:    None                                                                                                                                    Charity Goss MA       Chart routed to Care Team .

## 2019-12-03 NOTE — TELEPHONE ENCOUNTER
Panel Management Review  Summary:    Type of outreach:    Phone, left message for patient to call back.     Encounter routed to No Action Needed.                                                                                                                               Charity Goss MA

## 2020-02-03 DIAGNOSIS — L70.0 ACNE VULGARIS: ICD-10-CM

## 2020-02-04 RX ORDER — CLINDAMYCIN PHOSPHATE AND BENZOYL PEROXIDE 10; 50 MG/G; MG/G
GEL TOPICAL AT BEDTIME
Qty: 45 G | Refills: 1 | Status: SHIPPED | OUTPATIENT
Start: 2020-02-04 | End: 2021-07-30

## 2020-02-04 NOTE — TELEPHONE ENCOUNTER
"[facility] PHARMACY COMMENTS: Patient requests new Rx:  Pt is requesting a refill on her Benzoyl peroxide/clindamycin gel      Requested Prescriptions   Pending Prescriptions Disp Refills     benzoyl peroxide 5 % external liquid  Last Written Prescription Date:  1/2/2019  Last Fill Quantity: 226 g,  # refills: 3   Last office visit: 9/10/2019 with prescribing provider:  Chapin   Future Office Visit:   226 g 3     Sig: Use BID       Topical Acne Medications Protocol Failed - 2/3/2020  4:44 PM        Failed - Medication is active on med list        Passed - Patient is 12 years of age or older        Passed - Recent (12 mo) or future (30 days) visit within the authorizing provider's specialty     Patient has had an office visit with the authorizing provider or a provider within the authorizing providers department within the previous 12 mos or has a future within next 30 days. See \"Patient Info\" tab in inbasket, or \"Choose Columns\" in Meds & Orders section of the refill encounter.               "

## 2020-09-10 ENCOUNTER — TELEPHONE (OUTPATIENT)
Dept: FAMILY MEDICINE | Facility: CLINIC | Age: 40
End: 2020-09-10

## 2020-09-10 DIAGNOSIS — I10 BENIGN ESSENTIAL HYPERTENSION: ICD-10-CM

## 2020-09-10 DIAGNOSIS — F41.9 ANXIETY: ICD-10-CM

## 2020-09-14 NOTE — TELEPHONE ENCOUNTER
Routing refill request to provider for review/approval because:  Patient needs to be seen because it has been more than 1 year since last office visit.  BP Readings from Last 3 Encounters:   09/10/19 118/68   08/19/19 (!) 132/90   07/22/19 130/87

## 2020-09-15 DIAGNOSIS — I10 BENIGN ESSENTIAL HYPERTENSION: ICD-10-CM

## 2020-09-15 RX ORDER — HYDROCHLOROTHIAZIDE 25 MG/1
25 TABLET ORAL DAILY
Qty: 90 TABLET | Refills: 3 | Status: CANCELLED | OUTPATIENT
Start: 2020-09-15

## 2020-09-15 RX ORDER — PROPRANOLOL HCL 60 MG
CAPSULE, EXTENDED RELEASE 24HR ORAL
Qty: 30 CAPSULE | Refills: 0 | OUTPATIENT
Start: 2020-09-15

## 2020-09-15 NOTE — TELEPHONE ENCOUNTER
Last note below from over a year ago  -      history of elevated bp's in the past, reviewed the need for tight control, she is now following up with recommendation, avoid nsaids, recheck here in 3 months, check home bp's  Anxiety-better with inderal  Abnormal kidney function-has seen renal in the past and has told her to control bp's, avoid nsaids, consider ace I if done with pregnancy, check albumin at the next visit, she reports she cant not leave urine            Needs to be seen asap. , refilled only for one moree month  Madhav Samson D.O.

## 2020-09-15 NOTE — TELEPHONE ENCOUNTER
Spoke with patient and advised that she is due for an office visit prior to her next refill. She states she is all set on her medication for a little while and thinks her pharmacy automatically sent us this request, and she will schedule an office visit prior to her next refill.    Fan Roque

## 2020-09-16 NOTE — TELEPHONE ENCOUNTER
Please see 09/10 encounter.   I verified the refills [facility] since [facility] refill medication before she runs out.  She states, she combined all her medication she's  been getting automatically filled.   Has roughly 3-4 months worth to get her by.    Will call back in the next 3 months to schedule a visit.

## 2020-09-16 NOTE — TELEPHONE ENCOUNTER
She has had warming about being due for a visit, needs a visit for more refills, year since labs or visit  Madhav Samson D.O.

## 2020-09-16 NOTE — TELEPHONE ENCOUNTER
Routing refill request to provider for review/approval because:  Failed: blood pressure, creatinine, potassium, sodium       Mine Rojas RN

## 2020-10-19 NOTE — TELEPHONE ENCOUNTER
Nolad, PCP would like a visit. Scheduled today.  Dolly Shahid RN   
Reason for call:  Patient reporting a symptom    Symptom or request: UTI    Have you been treated for this before? Yes    Additional comments: Was treated 10 days ago with [facility] online doctor for UTI. The Rx has not helped. Can you switch the medication to what you usually treat her with for UTI's.    Pharmacy:  [facility] [address] [address] [address]    Phone Number patient can be reached at:   Bernie Croft (Self) [phone] (H)       Best Time:  anytime    Can we leave a detailed message on this number:  YES    Call taken on 11/6/2017 at 7:27 AM by Amee Enciso    
Negative

## 2020-11-22 ENCOUNTER — HEALTH MAINTENANCE LETTER (OUTPATIENT)
Age: 40
End: 2020-11-22

## 2020-12-01 ENCOUNTER — TELEPHONE (OUTPATIENT)
Dept: FAMILY MEDICINE | Facility: CLINIC | Age: 40
End: 2020-12-01

## 2020-12-01 NOTE — LETTER
[facility]  [address] [address]  [phone]                                                                                                December 14, 2020    Bernie Lara  [address] [address]        Dear Ms. Lara,    At [facility] we care about your health and well-being. A review of your chart has indicated that you are due for a(n) Mammogram, Pap and physical exam and blood pressure check. Please contact us at [phone] to schedule your appointment.    If you have already had one or all of the above screening tests at another facility, please call us to update your chart.     You may contact the clinic at [phone] if you have any questions or concerns about this request.      Sincerely,      Your Care Team

## 2020-12-01 NOTE — TELEPHONE ENCOUNTER
Patient Quality Outreach      Summary:    Patient has the following on her problem list/HM:   Hypertension   Last three blood pressure readings:  BP Readings from Last 3 Encounters:   09/10/19 118/68   08/19/19 (!) 132/90   07/22/19 130/87     Blood pressure: Passed    HTN Guidelines:  ? 139/89     Patient is due/failing the following:   Hypertension follow-up visit, Breast Cancer Screening - Mammogram, Adult/Adolescent physical, date due: 8/19/2020 and Immunizations    Type of outreach:    Sent [employer] message.    Questions for provider review:    None                                                                                                                                     Charity Goss MA       Chart routed to Care Team .

## 2020-12-09 NOTE — TELEPHONE ENCOUNTER
Patient Quality Outreach 2nd Attempt      Summary:    Type of outreach:    Phone, left message for patient/parent to call back.    Next Steps:  Reach out within 90 days via Letter.    Max number of attempts reached: Yes. Will try again in 90 days if patient still on fail list.    Questions for provider review:    None                                                                                                                    Charity Goss MA       Chart routed to Care Team.

## 2020-12-18 ENCOUNTER — TRANSFERRED RECORDS (OUTPATIENT)
Dept: HEALTH INFORMATION MANAGEMENT | Facility: CLINIC | Age: 40
End: 2020-12-18

## 2021-02-13 ENCOUNTER — HEALTH MAINTENANCE LETTER (OUTPATIENT)
Age: 41
End: 2021-02-13

## 2021-03-17 DIAGNOSIS — L74.510 HYPERHIDROSIS OF AXILLA: ICD-10-CM

## 2021-03-17 NOTE — TELEPHONE ENCOUNTER
Spoke with patient. She is requesting a refill for aluminum chloride (DRYSOL) 20 % external solution that she uses for sweaty armpits. She states that she is aware that she is due for an appointment and plans to schedule a visit shortly, but her current container of the medication broke and she is unable to use it. She still uses [facility] in [place].    Routing refill request to provider for review/approval because:  Patient needs to be seen because it has been more than 1 year since last office visit.

## 2021-05-30 ENCOUNTER — HEALTH MAINTENANCE LETTER (OUTPATIENT)
Age: 41
End: 2021-05-30

## 2021-05-31 NOTE — TELEPHONE ENCOUNTER
Called patient- she has family in town so she's not sure if she can make it to UC but will continue Monistat & come in on Tuesday.  Dolly Shahid RN    Rehabilitation services

## 2021-07-30 ENCOUNTER — OFFICE VISIT (OUTPATIENT)
Dept: FAMILY MEDICINE | Facility: CLINIC | Age: 41
End: 2021-07-30

## 2021-07-30 VITALS
WEIGHT: 190 LBS | OXYGEN SATURATION: 98 % | HEIGHT: 65 IN | HEART RATE: 80 BPM | BODY MASS INDEX: 31.65 KG/M2 | SYSTOLIC BLOOD PRESSURE: 124 MMHG | DIASTOLIC BLOOD PRESSURE: 90 MMHG

## 2021-07-30 DIAGNOSIS — R10.2 SUPRAPUBIC PAIN, ACUTE: Primary | ICD-10-CM

## 2021-07-30 DIAGNOSIS — I10 BENIGN ESSENTIAL HYPERTENSION: ICD-10-CM

## 2021-07-30 DIAGNOSIS — Z11.59 NEED FOR HEPATITIS C SCREENING TEST: ICD-10-CM

## 2021-07-30 DIAGNOSIS — Z13.220 SCREENING FOR HYPERLIPIDEMIA: ICD-10-CM

## 2021-07-30 DIAGNOSIS — F41.9 ANXIETY: ICD-10-CM

## 2021-07-30 LAB
ALBUMIN UR-MCNC: NEGATIVE MG/DL
APPEARANCE UR: CLEAR
BACTERIA #/AREA URNS HPF: ABNORMAL /HPF
BILIRUB UR QL STRIP: NEGATIVE
CLUE CELLS: ABNORMAL
COLOR UR AUTO: YELLOW
GLUCOSE UR STRIP-MCNC: NEGATIVE MG/DL
HGB UR QL STRIP: ABNORMAL
KETONES UR STRIP-MCNC: NEGATIVE MG/DL
LEUKOCYTE ESTERASE UR QL STRIP: NEGATIVE
NITRATE UR QL: NEGATIVE
PH UR STRIP: 7 [PH] (ref 5–7)
RBC #/AREA URNS AUTO: ABNORMAL /HPF
SP GR UR STRIP: 1.02 (ref 1–1.03)
SQUAMOUS #/AREA URNS AUTO: ABNORMAL /LPF
TRICHOMONAS, WET PREP: ABNORMAL
UROBILINOGEN UR STRIP-ACNC: 0.2 E.U./DL
WBC #/AREA URNS AUTO: ABNORMAL /HPF
WBC'S/HIGH POWER FIELD, WET PREP: ABNORMAL
YEAST, WET PREP: ABNORMAL

## 2021-07-30 PROCEDURE — 80048 BASIC METABOLIC PNL TOTAL CA: CPT | Performed by: STUDENT IN AN ORGANIZED HEALTH CARE EDUCATION/TRAINING PROGRAM

## 2021-07-30 PROCEDURE — 36415 COLL VENOUS BLD VENIPUNCTURE: CPT | Performed by: STUDENT IN AN ORGANIZED HEALTH CARE EDUCATION/TRAINING PROGRAM

## 2021-07-30 PROCEDURE — 86803 HEPATITIS C AB TEST: CPT | Performed by: STUDENT IN AN ORGANIZED HEALTH CARE EDUCATION/TRAINING PROGRAM

## 2021-07-30 PROCEDURE — 81001 URINALYSIS AUTO W/SCOPE: CPT | Performed by: STUDENT IN AN ORGANIZED HEALTH CARE EDUCATION/TRAINING PROGRAM

## 2021-07-30 PROCEDURE — 99214 OFFICE O/P EST MOD 30 MIN: CPT | Performed by: STUDENT IN AN ORGANIZED HEALTH CARE EDUCATION/TRAINING PROGRAM

## 2021-07-30 PROCEDURE — 87210 SMEAR WET MOUNT SALINE/INK: CPT | Performed by: STUDENT IN AN ORGANIZED HEALTH CARE EDUCATION/TRAINING PROGRAM

## 2021-07-30 PROCEDURE — 80061 LIPID PANEL: CPT | Performed by: STUDENT IN AN ORGANIZED HEALTH CARE EDUCATION/TRAINING PROGRAM

## 2021-07-30 RX ORDER — PROPRANOLOL HCL 60 MG
CAPSULE, EXTENDED RELEASE 24HR ORAL
Qty: 90 CAPSULE | Refills: 0 | Status: CANCELLED | OUTPATIENT
Start: 2021-07-30

## 2021-07-30 RX ORDER — HYDROCHLOROTHIAZIDE 25 MG/1
25 TABLET ORAL DAILY
Qty: 90 TABLET | Refills: 3 | Status: CANCELLED | OUTPATIENT
Start: 2021-07-30

## 2021-07-30 ASSESSMENT — MIFFLIN-ST. JEOR: SCORE: 1543.32

## 2021-07-30 NOTE — PROGRESS NOTES
"    Assessment & Plan     Suprapubic pain, acute  Recently treated via virtual visit with ?Keflex. Symptoms improved but still slightly symptomatic. Ddx: UTI (resistant to keflex), yeast infection, BV. Ordered UA and wet prep, will call with results.   - Wet prep - lab collect; Future  - UA with Microscopic reflex to Culture - lab collect; Future    Benign essential hypertension  BP today stable, borderline elevated diastolic pressure. Only taking her hydrochlorothiazide 50% of the time plus Propranolol. Check BMP today. Discussed switching antihypertensives to improve compliance. Reviewed Nephrology notes from past. She endorses being done with pregnancy, on Mirena. Pending lab results, will likely switch from hydrochlorothiazide to ACEI and continue Propranolol. Will need f/up within the next month to recheck BP, BMP and follow up preventative care.   - BASIC METABOLIC PANEL; Future    Anxiety  Continue Propranolol for palpitations     Need for hepatitis C screening test  Routine  screening  - Hepatitis C Screen Reflex to HCV RNA Quant and Genotype; Future    Screening for hyperlipidemia  Check Lipid panel today and return for follow up  - Lipid panel reflex to direct LDL Fasting; Future             BMI:   Estimated body mass index is 31.28 kg/m  as calculated from the following:    Height as of this encounter: 1.66 m (5' 5.35\").    Weight as of this encounter: 86.2 kg (190 lb).   Weight management plan: Patient was referred to their PCP to discuss a diet and exercise plan.  Regular exercise    Return in about 4 weeks (around 8/27/2021) for Routine preventive. Due for  PAP. Discussed ordering Mammo today and patient declined since she is self-pay.     Michelle Ortega DO  [facility]    Wendy Gibbs is a 41 year old who presents for the following health issues     HPI     Fasting    Occupation: Realtor     Hypertension Follow-up      Do you check your blood pressure regularly " outside of the clinic? No own a shitty BP machine, wrist. several months since she used this.     Are you following a low salt diet? No normal seasoning     Are your blood pressures ever more than 140 on the top number (systolic) OR more   than 90 on the bottom number (diastolic), for example 140/90?       How many servings of fruits and vegetables do you eat daily?  4 or more    On average, how many sweetened beverages do you drink each day (Examples: soda, juice, sweet tea, etc.  Do NOT count diet or artificially sweetened beverages)?   0    How many days per week do you exercise enough to make your heart beat faster? 3 or less    How many minutes a day do you exercise enough to make your heart beat faster? 30 - 60    How many days per week do you miss taking your medication?  remembers to take it. 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      Has not been taking her BP medications regularly. Not taking hydrochlorothiazide regularly only 50% of the time due to not being able to use a bathroom easily at work. Taking propanolol regularly.   Denies CP, SOB, HA, lightheadedness, dizzy, LE edema.Palpitations improved witth propanolol.   States she is done with pregnancy and would like to try a new medication. Has mirena.       Genitourinary - Female  Onset/Duration:   Description:   Painful urination (Dysuria): no           Frequency: YES   Blood in urine (Hematuria): no  Delay in urine (Hesitency): no  Intensity: NA  Progression of Symptoms:  same  Accompanying Signs & Symptoms:  Fever/chills: no  Flank pain: YES lower left back pain, notice this berfeo she gogt on the meds & then agin last night & today   Nausea and vomiting: no  Vaginal symptoms: dry, but wonder if it s tthe eds  Abdominal/Pelvic Pain: no  History:   History of frequent UTI s: YES  History of kidney stones: no, but Hx of kidney ultrasound, showed Right kidney  "shrinking  & scarring  Sexually Active: YES rarely, some partner   Possibility of pregnancy: No  Therapies tried and outcome: cranberry pills, Urnianrt tract balance pill     Hello whisp, Virtual Last week. Took 3 day course of antibiotic (cephalexin?), finished on Monday. Feels slightly improved. Hx of frequent UTI's and BV. Uses probiotic. Currently denies dysuria but does endorse a vaginal odor and suprapubic pain and mild LBP.   Diet coke tends to result in UTI's she's noticed.         Review of Systems   Constitutional, HEENT, cardiovascular, pulmonary, gi and gu systems are negative, except as otherwise noted.      Objective    BP (!) 124/90 (BP Location: Right arm, Patient Position: Sitting, Cuff Size: Adult Regular)   Pulse 80   Ht 1.66 m (5' 5.35\")   Wt 86.2 kg (190 lb)   SpO2 98%   BMI 31.28 kg/m    Body mass index is 31.28 kg/m .  Physical Exam   GENERAL: healthy, alert and no distress  NECK: no adenopathy, no asymmetry, masses, or scars and thyroid normal to palpation  RESP: lungs clear to auscultation - no rales, rhonchi or wheezes  CV: regular rate and rhythm, normal S1 S2, no S3 or S4, no murmur, click or rub, no peripheral edema and peripheral pulses strong  ABDOMEN: soft, suprapubic tender, no hepatosplenomegaly, no masses and bowel sounds normal, no CVA tenderness  MS: no gross musculoskeletal defects noted, no edema    Labs pending. Will send my chart message.             "

## 2021-07-30 NOTE — PATIENT INSTRUCTIONS
Last Gibbs,    It was nice meeting you today.    I ordered some blood work, I will send you a [employer] message with the results.     I will wait to refill your medications until we review your labs since you have enough medication at home.     I recommend you return to the office in the next 2-4 weeks for your physical as you are due for your pap.     If you have any questions or concerns, please feel free to call us at [phone] or email.     Sincerely,    Dr. Ortega        Patient Education     Discharge Instructions for High Blood Pressure (Hypertension)  You have been diagnosed with high blood pressure (hypertension). This means the force of blood against your artery walls is too strong. It also means your heart is working hard to move blood. High blood pressure usually has no symptoms, but over time, it can cause serious health problems. High blood pressure raises your risk for heart attack, stroke, heart disease, heart failure, kidney disease, and vision loss. With help from your doctor, you can manage your blood pressure and protect your health.  Blood pressure measurements are given as 2 numbers. Systolic blood pressure is the upper number. This is the pressure when the heart contracts or pumps. Diastolic blood pressure is the lower number. This is the pressure when the heart relaxes between beats.  Blood pressure is categorized as normal, elevated, or stage 1 or stage 2 high blood pressure:    Normal blood pressure is systolic of less than 120 and diastolic of less than 80 (120/80) at rest    Elevated blood pressure is systolic of 120 to 129 and diastolic less than 80 at rest    Stage 1 high blood pressure is systolic is 130 to 139 or diastolic between 80 to 89 at rest    Stage 2 high blood pressure is when systolic is 140 or higher or the diastolic is 90 or higher at rest  Taking medicine    Learn to measure your own blood pressure. Keep a record of your results. Ask your doctor which readings mean that  you need medical attention.    Take your blood pressure medicine exactly as directed. Don t skip doses. Missing doses can cause your blood pressure to get out of control.    If you do miss a dose (or doses) check with your healthcare provider about what to do.    Don't take medicines that contain heart stimulants, including over-the-counter medicines. Check for warnings about high blood pressure on the label. Ask the pharmacist before purchasing something you haven't used before    Check with your doctor or pharmacist before taking a decongestant such as pseudoephedrine or phenylephrine. Some decongestants can worsen high blood pressure.    Lifestyle changes    Stay at a healthy weight. Get help to lose any extra pounds (kilograms). Often times meeting with a dietitian can help you identify changes that can be made to your diet to help with weight loss.    Cut back on salt.  ? Limit canned, dried, packaged, and fast foods.  ? Don t add salt to your food at the table.  ? Season foods with herbs instead of salt when you cook.  ? Request no added salt when you go to a restaurant.  ? The American Heart Association (AHA) says the ideal amount of sodium is no more than 1,500 mg a day.  But because Americans eat so much salt, you can make a positive change by cutting back to even 2,300 mg of sodium a day (1 teaspoon).     Follow the DASH (Dietary Approaches to Stop Hypertension) eating plan. This plan recommends vegetables, fruits, whole gains, and other heart healthy foods.    Eat food rich in potassium.    Begin an exercise program. Ask your healthcare provider how to get started. The AHA recommends aerobic exercise 3 to 4 times a week for an average of 40 minutes at a time to lower blood pressure, with your provider's approval. Simple activities such as walking or gardening can help.    Break the smoking habit. Enroll in a stop-smoking program to improve your chances of success. Ask your healthcare provider about  programs and medicines to help you stop smoking.    Limit drinks that contain caffeine such as coffee, black or green tea, and cola to 2 per day.    Never take stimulants such as amphetamines or cocaine. These drugs can be deadly for someone with high blood pressure.    Control your stress. Learn ways to manage stress.    Limit alcohol to no more than 1 drink a day for women and 2 drinks a day for men.    Follow-up care  Make a follow-up appointment as directed.  When to call your healthcare provider  Call your healthcare provider right away if you have any of the following:    Chest pain or shortness of breath ( call 911)    Moderate to severe headache    Weakness in the muscles of your face, arms, or legs    Trouble speaking    Extreme drowsiness    Confusion    Fainting or dizziness    Pulsating or rushing sound in your ears    Unexplained nosebleed    Weakness, tingling, or numbness of your face, arms, or legs    Change in vision    Blood pressure measured at home that is greater than 180/110  [employer] last reviewed this educational content on 8/1/2019 2000-2021 [employer]. All rights reserved. This information is not intended as a substitute for professional medical care. Always follow your healthcare professional's instructions.

## 2021-07-31 LAB
ANION GAP SERPL CALCULATED.3IONS-SCNC: 6 MMOL/L (ref 3–14)
BUN SERPL-MCNC: 16 MG/DL (ref 7–30)
CALCIUM SERPL-MCNC: 9 MG/DL (ref 8.5–10.1)
CHLORIDE BLD-SCNC: 105 MMOL/L (ref 94–109)
CHOLEST SERPL-MCNC: 214 MG/DL
CO2 SERPL-SCNC: 27 MMOL/L (ref 20–32)
CREAT SERPL-MCNC: 1.14 MG/DL (ref 0.52–1.04)
FASTING STATUS PATIENT QL REPORTED: YES
GFR SERPL CREATININE-BSD FRML MDRD: 61 ML/MIN/1.73M2
GLUCOSE BLD-MCNC: 82 MG/DL (ref 70–99)
HDLC SERPL-MCNC: 60 MG/DL
LDLC SERPL CALC-MCNC: 134 MG/DL
NONHDLC SERPL-MCNC: 154 MG/DL
POTASSIUM BLD-SCNC: 3.9 MMOL/L (ref 3.4–5.3)
SODIUM SERPL-SCNC: 138 MMOL/L (ref 133–144)
TRIGL SERPL-MCNC: 99 MG/DL

## 2021-08-02 DIAGNOSIS — I10 BENIGN ESSENTIAL HYPERTENSION: Primary | ICD-10-CM

## 2021-08-02 LAB — HCV AB SERPL QL IA: NONREACTIVE

## 2021-08-02 RX ORDER — LISINOPRIL 10 MG/1
10 TABLET ORAL DAILY
Qty: 30 TABLET | Refills: 1 | Status: SHIPPED | OUTPATIENT
Start: 2021-08-02 | End: 2021-10-11

## 2021-08-03 ENCOUNTER — TELEPHONE (OUTPATIENT)
Dept: FAMILY MEDICINE | Facility: CLINIC | Age: 41
End: 2021-08-03

## 2021-08-03 NOTE — TELEPHONE ENCOUNTER
RN left  for patient at [phone] requesting call back to clinic.    RN called to relay providers message      ----- Message from Michelle Ortega DO sent at 8/2/2021  6:03 PM CDT -----  Hello! Can you give Bernie the information below regarding stopping her hydrochlorothiazide since she has only been taking it 50% of the time and starting on Lisinopril? If she has a BP cuff at home, please have her check her BP. She needs a follow up visit in 4 weeks to check labs and her BP. Thank you!     Michelle mcgill for getting your labs done. Your labs were normal except your cholesterol was slightly high. I recommend you limit the amount of saturated fats you eat. This includes meat, cheese, ice cream, fast foods, processed foods. A medication to reduce your cholesterol is not recommended at this time.     You kidney function is also stable from the last check. As we discussed in clinic, since you are not planning to have any more children and have a reliable form of birth control with the IUD, we can change your blood pressure medication. We can stop the hydrochlorothiazide and start Lisinopril that nephrology had recommended. I will send that in for you. I would like you to return back in the clinic in 4-6 weeks to follow up on your blood pressure and repeat labs since changing the medication. Please call or email if you have any questions.       Jeremiah Valdez RN, BSN, PHN  [facility]

## 2021-08-03 NOTE — LETTER
August 5, 2021      Bernie Lara  [address]  [place] [address]      Dear Bernie,    We recently have been trying to reach you in regards to lab results but have been unsuccessful.   Please call the clinic at [phone] and ask to speak to a nurse.    Thanks,  [facility]

## 2021-08-04 NOTE — TELEPHONE ENCOUNTER
Attempt #2 to call patient.    RN left VM for patient at [phone] requesting call back to clinic.    RN also advised patient to check her My Chart messages as well.    Jeremiah Valdez RN, BSN, PHN  [facility]

## 2021-08-05 NOTE — TELEPHONE ENCOUNTER
Attempt to contact patient x 2 without response.     TC, please send call back letter.     Nidhi Noonan RN, BSN, PHN  [facility]

## 2021-08-05 NOTE — TELEPHONE ENCOUNTER
Mailed letter to patient asking for her to contact clinic.    KENTRELL Brewster  [facility]

## 2021-09-19 ENCOUNTER — HEALTH MAINTENANCE LETTER (OUTPATIENT)
Age: 41
End: 2021-09-19

## 2021-10-11 ENCOUNTER — OFFICE VISIT (OUTPATIENT)
Dept: FAMILY MEDICINE | Facility: CLINIC | Age: 41
End: 2021-10-11

## 2021-10-11 VITALS
BODY MASS INDEX: 32.15 KG/M2 | WEIGHT: 193 LBS | SYSTOLIC BLOOD PRESSURE: 120 MMHG | HEIGHT: 65 IN | DIASTOLIC BLOOD PRESSURE: 76 MMHG | OXYGEN SATURATION: 97 % | HEART RATE: 80 BPM | TEMPERATURE: 98.3 F

## 2021-10-11 DIAGNOSIS — I10 BENIGN ESSENTIAL HYPERTENSION: Primary | ICD-10-CM

## 2021-10-11 DIAGNOSIS — R79.89 ELEVATED SERUM CREATININE: ICD-10-CM

## 2021-10-11 DIAGNOSIS — F41.9 ANXIETY: ICD-10-CM

## 2021-10-11 LAB
ANION GAP SERPL CALCULATED.3IONS-SCNC: 2 MMOL/L (ref 3–14)
BUN SERPL-MCNC: 20 MG/DL (ref 7–30)
CALCIUM SERPL-MCNC: 8.8 MG/DL (ref 8.5–10.1)
CHLORIDE BLD-SCNC: 108 MMOL/L (ref 94–109)
CO2 SERPL-SCNC: 27 MMOL/L (ref 20–32)
CREAT SERPL-MCNC: 1.13 MG/DL (ref 0.52–1.04)
GFR SERPL CREATININE-BSD FRML MDRD: 61 ML/MIN/1.73M2
GLUCOSE BLD-MCNC: 92 MG/DL (ref 70–99)
POTASSIUM BLD-SCNC: 4.2 MMOL/L (ref 3.4–5.3)
SODIUM SERPL-SCNC: 137 MMOL/L (ref 133–144)

## 2021-10-11 PROCEDURE — 99214 OFFICE O/P EST MOD 30 MIN: CPT | Performed by: STUDENT IN AN ORGANIZED HEALTH CARE EDUCATION/TRAINING PROGRAM

## 2021-10-11 PROCEDURE — 36415 COLL VENOUS BLD VENIPUNCTURE: CPT | Performed by: STUDENT IN AN ORGANIZED HEALTH CARE EDUCATION/TRAINING PROGRAM

## 2021-10-11 PROCEDURE — 80048 BASIC METABOLIC PNL TOTAL CA: CPT | Performed by: STUDENT IN AN ORGANIZED HEALTH CARE EDUCATION/TRAINING PROGRAM

## 2021-10-11 RX ORDER — PROPRANOLOL HCL 60 MG
CAPSULE, EXTENDED RELEASE 24HR ORAL
Qty: 90 CAPSULE | Refills: 3 | Status: SHIPPED | OUTPATIENT
Start: 2021-10-11 | End: 2023-09-29

## 2021-10-11 RX ORDER — LISINOPRIL 10 MG/1
10 TABLET ORAL DAILY
Qty: 90 TABLET | Refills: 3 | Status: SHIPPED | OUTPATIENT
Start: 2021-10-11 | End: 2022-11-03

## 2021-10-11 ASSESSMENT — MIFFLIN-ST. JEOR: SCORE: 1556.93

## 2021-10-11 NOTE — PROGRESS NOTES
Assessment & Plan     Benign essential hypertension  BP well controlled today since changing medications. At last visit started Lisinopril 10mg and discontinued hydrochlorothiazide, and continued Propranolol. Change occurred due to poor medication compliance on hydrochlorothiazide due to urinary frequency. BP well controlled on Lisinopril without side effects. Check BMP today. She has been seen previously by Nephrology for elevated Creatinine. Do albumin at next visit.   - Basic metabolic panel  (Ca, Cl, CO2, Creat, Gluc, K, Na, BUN); Future  - propranolol ER (INDERAL LA) 60 MG 24 hr capsule; Take 1 tablet daily  - lisinopril (ZESTRIL) 10 MG tablet; Take 1 tablet (10 mg) by mouth daily  - Basic metabolic panel  (Ca, Cl, CO2, Creat, Gluc, K, Na, BUN)    Anxiety  Continue Propranolol   - propranolol ER (INDERAL LA) 60 MG 24 hr capsule; Take 1 tablet daily    Elevated serum creatinine  Saw Nephrology for this previously. Had renal US with small size of R kidney. Has not seen Nephrology in a few years, reviewed records. Check BMP today after switching to Lisinopril. Creatinine was stable at her last visit.       Return in about 6 months (around 4/11/2022) for Follow up BP.    Michelle Ortega DO  [facility]        Wendy Gibbs is a 41 year old who presents for the following health issues     HPI     Hypertension Follow-up      Do you check your blood pressure regularly outside of the clinic? No has wrist doesn't trust the readings    Are you following a low salt diet? Yes normal     Are your blood pressures ever more than 140 on the top number (systolic) OR more   than 90 on the bottom number (diastolic), for example 140/90?       How many servings of fruits and vegetables do you eat daily?  4 or more    On average, how many sweetened beverages do you drink each day (Examples: soda, juice, sweet tea, etc.  Do NOT count diet or artificially sweetened beverages)?   0    How many days  "per week do you exercise enough to make your heart beat faster? 3 or less    How many minutes a day do you exercise enough to make your heart beat faster? 60 or more    How many days per week do you miss taking your medication? 0    Side effect: none. No cough, no concerns with new medication    Doesn't check BP at home. Has a wrist cuff available  If needed.     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    Has take Lisinopril daily since starting it.     Has previously been seen by Nephrology for elevated Cr.     She is still planning on not becoming pregnant. Has Mirena in place.        Review of Systems   Constitutional, HEENT, cardiovascular, pulmonary, gi and gu systems are negative, except as otherwise noted.      Objective    /76 (BP Location: Right arm, Patient Position: Sitting, Cuff Size: Adult Regular)   Pulse 80   Temp 98.3  F (36.8  C) (Oral)   Ht 1.66 m (5' 5.35\")   Wt 87.5 kg (193 lb)   SpO2 97%   BMI 31.77 kg/m    Body mass index is 31.77 kg/m .  Physical Exam   GENERAL: healthy, alert and no distress  NECK: no adenopathy, no asymmetry, masses, or scars and thyroid normal to palpation  RESP: lungs clear to auscultation - no rales, rhonchi or wheezes  CV: regular rate and rhythm, normal S1 S2, no S3 or S4, no murmur, click or rub, no peripheral edema and peripheral pulses strong  ABDOMEN: soft, nontender, no hepatosplenomegaly, no masses and bowel sounds normal  MS: no gross musculoskeletal defects noted, no edema    "

## 2021-10-11 NOTE — PATIENT INSTRUCTIONS
Patient Education     Low-Salt Choices  Eating salt (sodium) can make your body retain too much water. Extra water makes your heart work harder. Canned, packaged, and frozen foods are easy to prepare. But they are often high in sodium. Here are some ideas for low-salt foods you can easily make yourself.   For breakfast    Fruit or 100% fruit juice. It's better to have whole fruit instead of 100% fruit juice.    Whole-wheat bread or an English muffin. Look for sodium content on Nutrition Facts labels.    Low-fat milk or yogurt    Unsalted eggs    Shredded wheat    Corn tortillas    Unsalted steamed rice    Regular (not instant) hot cereal, made without salt    Stay away from    Sausage, herring, and ham    Flour tortillas    Packaged muffins, pancakes, and biscuits    Instant hot cereals    Cottage cheese    For lunch and dinner    Fresh fish, chicken, turkey, or meat--baked, broiled, or roasted without salt    Dry beans, cooked without salt    Tofu, stir-fried without salt    Unsalted fresh fruit and vegetables, or frozen or canned fruit and vegetables with no added salt  Stay away from    Lunch or deli meat that is cured or smoked    Cheese    Tomato juice and ketchup    Canned vegetables, soups, and fish not labeled as no-salt-added or reduced sodium    Packaged gravies and sauces    Olives, pickles, and relish    Bottled salad dressings    For snacks and desserts    Yogurt    Unsalted, air-popped popcorn    Unsalted nuts or seeds  Stay away from    Pies and cakes    Packaged dessert mixes    Pizza    Canned and packaged puddings    Pretzels, chips, crackers, and nuts--unless the label says unsalted    [employer] last reviewed this educational content on 11/1/2019 2000-2021 [employer]. All rights reserved. This information is not intended as a substitute for professional medical care. Always follow your healthcare professional's instructions.

## 2022-01-09 ENCOUNTER — HEALTH MAINTENANCE LETTER (OUTPATIENT)
Age: 42
End: 2022-01-09

## 2022-03-06 ENCOUNTER — HEALTH MAINTENANCE LETTER (OUTPATIENT)
Age: 42
End: 2022-03-06

## 2022-05-16 ENCOUNTER — TELEPHONE (OUTPATIENT)
Dept: FAMILY MEDICINE | Facility: CLINIC | Age: 42
End: 2022-05-16

## 2022-05-17 NOTE — TELEPHONE ENCOUNTER
Reason for Call:  Other appointment    Detailed comments: She has had ongoing diarrhea for 11 days. We  Tried looking at appointments and her doc had non until the end of June and other Doctors had not until June 9th. She would like to get in as soon as possible as this has already been going on for 11 days     Phone Number Patient can be reached at: Cell number on file:    Telephone Information:   Mobile [phone]       Best Time: Anytime     Can we leave a detailed message on this number? YES    Call taken on 5/16/2022 at 8:42 PM by China Atkins

## 2022-08-21 ENCOUNTER — HEALTH MAINTENANCE LETTER (OUTPATIENT)
Age: 42
End: 2022-08-21

## 2022-11-03 DIAGNOSIS — I10 BENIGN ESSENTIAL HYPERTENSION: ICD-10-CM

## 2022-11-03 RX ORDER — LISINOPRIL 10 MG/1
10 TABLET ORAL DAILY
Qty: 30 TABLET | Refills: 3 | Status: SHIPPED | OUTPATIENT
Start: 2022-11-03 | End: 2023-07-31

## 2022-11-15 ENCOUNTER — TELEPHONE (OUTPATIENT)
Dept: FAMILY MEDICINE | Facility: CLINIC | Age: 42
End: 2022-11-15

## 2022-11-15 NOTE — LETTER
[facility]  [address] [address]  [phone]                                                                                                November 15, 2022    Bernie Lara  [address] [address]        Dear Ms. Lara,    At [facility] we care about your health and well-being. A review of your chart has indicated that you are due for a(n) Annual Preventative Exam with Pap Smear, Blood pressure check, and mammogram. I did speak with you on the phone about the need for an appointment to get further refills but wanted to send this letter as a reminder. Thank you and we will see you in clinic soon.     You may contact the clinic at [phone] if you have any questions or concerns about this request.      Sincerely,      Your Care Team

## 2022-11-15 NOTE — TELEPHONE ENCOUNTER
Spoke with patient, she is aware she needed appointment. Did not want to schedule on the phone today.     Sent letter as a reminder.    Charity Goss MA

## 2022-11-15 NOTE — TELEPHONE ENCOUNTER
Patient Quality Outreach    Patient is due for the following:   Hypertension -  Hypertension follow-up visit  Breast Cancer Screening - Mammogram  Cervical Cancer Screening - PAP Needed  Physical Preventive Adult Physical    Next Steps:   Schedule a Adult Preventative    Type of outreach:    Spoke with patient. She will schedule visit.   Declined on the phone.   Sent letter as a reminder.       Questions for provider review:    None     Charity Goss, [facility]

## 2022-11-21 ENCOUNTER — HEALTH MAINTENANCE LETTER (OUTPATIENT)
Age: 42
End: 2022-11-21

## 2022-12-28 ENCOUNTER — ALLIED HEALTH/NURSE VISIT (OUTPATIENT)
Dept: FAMILY MEDICINE | Facility: CLINIC | Age: 42
End: 2022-12-28

## 2022-12-28 VITALS — SYSTOLIC BLOOD PRESSURE: 118 MMHG | DIASTOLIC BLOOD PRESSURE: 78 MMHG

## 2022-12-28 DIAGNOSIS — Z01.30 BP CHECK: Primary | ICD-10-CM

## 2022-12-28 PROCEDURE — 99207 PR NO CHARGE NURSE ONLY: CPT | Performed by: STUDENT IN AN ORGANIZED HEALTH CARE EDUCATION/TRAINING PROGRAM

## 2022-12-28 NOTE — PROGRESS NOTES
Bernie Lara was evaluated at [facility] on December 28, 2022 at which time her blood pressure was:    BP Readings from Last 3 Encounters:   12/28/22 118/78   10/11/21 120/76   07/30/21 (!) 124/90     Pulse Readings from Last 3 Encounters:   10/11/21 80   07/30/21 80   09/10/19 78       Reviewed lifestyle modifications for blood pressure control and reduction: including making healthy food choices, managing weight, getting regular exercise, smoking cessation, reducing alcohol consumption, monitoring blood pressure regularly.     Symptoms: None    BP Goal:< 140/90 mmHg    BP Assessment:  BP at goal    Potential Reasons for BP too high: NA - Not applicable    BP Follow-Up Plan: Recheck BP in 6 months at pharmacy    Recommendation to Provider: None    Note completed by: Grey

## 2023-04-16 ENCOUNTER — HEALTH MAINTENANCE LETTER (OUTPATIENT)
Age: 43
End: 2023-04-16

## 2023-07-30 DIAGNOSIS — I10 BENIGN ESSENTIAL HYPERTENSION: ICD-10-CM

## 2023-07-31 RX ORDER — LISINOPRIL 10 MG/1
10 TABLET ORAL DAILY
Qty: 30 TABLET | Refills: 2 | Status: SHIPPED | OUTPATIENT
Start: 2023-07-31 | End: 2023-11-20

## 2023-09-29 ENCOUNTER — OFFICE VISIT (OUTPATIENT)
Dept: FAMILY MEDICINE | Facility: CLINIC | Age: 43
End: 2023-09-29
Payer: COMMERCIAL

## 2023-09-29 VITALS
OXYGEN SATURATION: 97 % | TEMPERATURE: 98.3 F | HEART RATE: 94 BPM | BODY MASS INDEX: 30.49 KG/M2 | HEIGHT: 65 IN | WEIGHT: 183 LBS | DIASTOLIC BLOOD PRESSURE: 76 MMHG | SYSTOLIC BLOOD PRESSURE: 120 MMHG

## 2023-09-29 DIAGNOSIS — R79.89 ELEVATED SERUM CREATININE: ICD-10-CM

## 2023-09-29 DIAGNOSIS — I10 BENIGN ESSENTIAL HYPERTENSION: Primary | ICD-10-CM

## 2023-09-29 DIAGNOSIS — Z98.84 BARIATRIC SURGERY STATUS: Chronic | ICD-10-CM

## 2023-09-29 DIAGNOSIS — Z13.220 SCREENING CHOLESTEROL LEVEL: ICD-10-CM

## 2023-09-29 DIAGNOSIS — Z13.1 SCREENING FOR DIABETES MELLITUS: ICD-10-CM

## 2023-09-29 DIAGNOSIS — E66.811 CLASS 1 OBESITY WITH SERIOUS COMORBIDITY AND BODY MASS INDEX (BMI) OF 30.0 TO 30.9 IN ADULT, UNSPECIFIED OBESITY TYPE: ICD-10-CM

## 2023-09-29 LAB
ANION GAP SERPL CALCULATED.3IONS-SCNC: 10 MMOL/L (ref 7–15)
BUN SERPL-MCNC: 17.3 MG/DL (ref 6–20)
CALCIUM SERPL-MCNC: 9.7 MG/DL (ref 8.6–10)
CHLORIDE SERPL-SCNC: 105 MMOL/L (ref 98–107)
CHOLEST SERPL-MCNC: 192 MG/DL
CREAT SERPL-MCNC: 1.21 MG/DL (ref 0.51–0.95)
DEPRECATED HCO3 PLAS-SCNC: 24 MMOL/L (ref 22–29)
EGFRCR SERPLBLD CKD-EPI 2021: 57 ML/MIN/1.73M2
GLUCOSE SERPL-MCNC: 90 MG/DL (ref 70–99)
HBA1C MFR BLD: 5.1 % (ref 0–5.6)
HDLC SERPL-MCNC: 47 MG/DL
LDLC SERPL CALC-MCNC: 123 MG/DL
NONHDLC SERPL-MCNC: 145 MG/DL
POTASSIUM SERPL-SCNC: 4.7 MMOL/L (ref 3.4–5.3)
SODIUM SERPL-SCNC: 139 MMOL/L (ref 135–145)
TRIGL SERPL-MCNC: 108 MG/DL

## 2023-09-29 PROCEDURE — 90480 ADMN SARSCOV2 VAC 1/ONLY CMP: CPT | Performed by: STUDENT IN AN ORGANIZED HEALTH CARE EDUCATION/TRAINING PROGRAM

## 2023-09-29 PROCEDURE — 83036 HEMOGLOBIN GLYCOSYLATED A1C: CPT | Performed by: STUDENT IN AN ORGANIZED HEALTH CARE EDUCATION/TRAINING PROGRAM

## 2023-09-29 PROCEDURE — 90686 IIV4 VACC NO PRSV 0.5 ML IM: CPT | Performed by: STUDENT IN AN ORGANIZED HEALTH CARE EDUCATION/TRAINING PROGRAM

## 2023-09-29 PROCEDURE — 90471 IMMUNIZATION ADMIN: CPT | Performed by: STUDENT IN AN ORGANIZED HEALTH CARE EDUCATION/TRAINING PROGRAM

## 2023-09-29 PROCEDURE — 36415 COLL VENOUS BLD VENIPUNCTURE: CPT | Performed by: STUDENT IN AN ORGANIZED HEALTH CARE EDUCATION/TRAINING PROGRAM

## 2023-09-29 PROCEDURE — 80048 BASIC METABOLIC PNL TOTAL CA: CPT | Performed by: STUDENT IN AN ORGANIZED HEALTH CARE EDUCATION/TRAINING PROGRAM

## 2023-09-29 PROCEDURE — 99214 OFFICE O/P EST MOD 30 MIN: CPT | Mod: 25 | Performed by: STUDENT IN AN ORGANIZED HEALTH CARE EDUCATION/TRAINING PROGRAM

## 2023-09-29 PROCEDURE — 91320 SARSCV2 VAC 30MCG TRS-SUC IM: CPT | Performed by: STUDENT IN AN ORGANIZED HEALTH CARE EDUCATION/TRAINING PROGRAM

## 2023-09-29 PROCEDURE — 80061 LIPID PANEL: CPT | Performed by: STUDENT IN AN ORGANIZED HEALTH CARE EDUCATION/TRAINING PROGRAM

## 2023-09-29 PROCEDURE — 90715 TDAP VACCINE 7 YRS/> IM: CPT | Performed by: STUDENT IN AN ORGANIZED HEALTH CARE EDUCATION/TRAINING PROGRAM

## 2023-09-29 PROCEDURE — 90472 IMMUNIZATION ADMIN EACH ADD: CPT | Performed by: STUDENT IN AN ORGANIZED HEALTH CARE EDUCATION/TRAINING PROGRAM

## 2023-09-29 RX ORDER — TOPIRAMATE 50 MG/1
50 TABLET, FILM COATED ORAL DAILY
Qty: 30 TABLET | Refills: 2 | Status: SHIPPED | OUTPATIENT
Start: 2023-09-29 | End: 2023-10-25

## 2023-09-29 NOTE — PROGRESS NOTES
Assessment & Plan     Benign essential hypertension  Chronic, stable. Currently on lisinopril 10 mg daily but forgets to take medication frequently on average 4 times/week. Pt stopped taking the propranolol. BP today normotensive. Needs labs today.   - BASIC METABOLIC PANEL; Future  - BASIC METABOLIC PANEL    Elevated serum creatinine   Repeat labs today. Saw nephrology in the past; advised avoid nsaids, we switched to lisinopril from hydrochlorothiazide     S/P Adjustable Band baraitric surgery   Class 1 obesity with serious comorbidity and body mass index (BMI) of 30.0 to 30.9 in adult, unspecified obesity type  Discussed various weight loss options. She has been working on diet and exercise but limited by time. She would prefer no long term medications. BP now well controlled, preferred topamax vs phentermine. Discussed side effects and will do a trial of topamax and follow up in 3 months. Also discussed dietician but declined for now  - topiramate (TOPAMAX) 50 MG tablet; Take 1 tablet (50 mg) by mouth daily    Screening for diabetes mellitus  - Hemoglobin A1c; Future  - Hemoglobin A1c    Screening cholesterol level  - Lipid panel reflex to direct LDL Fasting; Future  - Lipid panel reflex to direct LDL Fasting    Discussed returning in 3 months for physical with pap. Declined mammogram today    Michelle Ortega DO  [facility]    Wendy Gibbs is a 43 year old, presenting for the following health issues:  Chronic Disease Management        9/29/2023     8:27 AM   Additional Questions   Accompanied by na         9/29/2023     8:27 AM   Patient Reported Additional Medications   Patient reports taking the following new medications none       History of Present Illness       Hypertension: She presents for follow up of hypertension.  She does not check blood pressure  regularly outside of the clinic. Outpatient blood pressures have not been over 140/90. She does not follow a low salt  "diet.     She eats 4 or more servings of fruits and vegetables daily.She consumes 0 sweetened beverage(s) daily.She exercises with enough effort to increase her heart rate 9 or less minutes per day.  She exercises with enough effort to increase her heart rate 3 or less days per week. She is missing 2 dose(s) of medications per week.     Fasting today     Weight down 5lb - made changes in diet.   Hx of lap band surgery   Busy around the house and in garden.   Would like to lose 15- 20 lbs, hoping to get off of BP medication then.   Would prefer not to be on weight loss medications everyday.   Can't overeat too much due to surgery.   Lunch - rotisserie chicken, veggies, cottage cheese, hard boiled eggs. Likes chocolate but tries not to have it at home. Lot of vegetables and hummus. Lower carb. Not eating out much.     BP medication - only takes lisinopril 4 days per week. Hasn't taken propranolol in a long time.         Review of Systems   Constitutional, HEENT, cardiovascular, pulmonary, gi and gu systems are negative, except as otherwise noted.      Objective    /76 (Cuff Size: Adult Regular)   Pulse 94   Temp 98.3  F (36.8  C) (Oral)   Ht 1.651 m (5' 5\")   Wt 83 kg (183 lb)   SpO2 97%   BMI 30.45 kg/m    Body mass index is 30.45 kg/m .  Physical Exam   GENERAL: healthy, alert and no distress  EYES: Eyes grossly normal to inspection, PERRL and conjunctivae and sclerae normal  HENT: ear canals and TM's normal, nose and mouth without ulcers or lesions  NECK: no adenopathy, no asymmetry, masses, or scars and thyroid normal to palpation  RESP: lungs clear to auscultation - no rales, rhonchi or wheezes  CV: regular rate and rhythm, normal S1 S2, no S3 or S4, no murmur, click or rub, no peripheral edema and peripheral pulses strong  ABDOMEN: soft, nontender, no hepatosplenomegaly, no masses and bowel sounds normal  MS: no gross musculoskeletal defects noted, no edema  PSYCH: mentation appears normal, affect " normal/bright

## 2023-10-21 DIAGNOSIS — E66.811 CLASS 1 OBESITY WITH SERIOUS COMORBIDITY AND BODY MASS INDEX (BMI) OF 30.0 TO 30.9 IN ADULT, UNSPECIFIED OBESITY TYPE: ICD-10-CM

## 2023-10-25 RX ORDER — TOPIRAMATE 50 MG/1
50 TABLET, FILM COATED ORAL DAILY
Qty: 90 TABLET | Refills: 0 | Status: SHIPPED | OUTPATIENT
Start: 2023-10-25 | End: 2024-01-23

## 2023-10-25 RX ORDER — TOPIRAMATE 50 MG/1
50 TABLET, FILM COATED ORAL DAILY
Qty: 90 TABLET | Refills: 1 | OUTPATIENT
Start: 2023-10-25

## 2023-11-02 ENCOUNTER — OFFICE VISIT (OUTPATIENT)
Dept: FAMILY MEDICINE | Facility: CLINIC | Age: 43
End: 2023-11-02
Payer: COMMERCIAL

## 2023-11-02 VITALS
SYSTOLIC BLOOD PRESSURE: 137 MMHG | DIASTOLIC BLOOD PRESSURE: 95 MMHG | HEART RATE: 74 BPM | HEIGHT: 65 IN | TEMPERATURE: 99 F | WEIGHT: 179 LBS | RESPIRATION RATE: 18 BRPM | BODY MASS INDEX: 29.82 KG/M2 | OXYGEN SATURATION: 100 %

## 2023-11-02 DIAGNOSIS — I10 BENIGN ESSENTIAL HYPERTENSION: ICD-10-CM

## 2023-11-02 DIAGNOSIS — R89.9 ABNORMAL LABORATORY TEST RESULT: ICD-10-CM

## 2023-11-02 DIAGNOSIS — N89.8 VAGINAL DISCHARGE: ICD-10-CM

## 2023-11-02 DIAGNOSIS — Z12.4 CERVICAL CANCER SCREENING: ICD-10-CM

## 2023-11-02 DIAGNOSIS — Z12.31 VISIT FOR SCREENING MAMMOGRAM: ICD-10-CM

## 2023-11-02 DIAGNOSIS — Z00.00 ROUTINE GENERAL MEDICAL EXAMINATION AT A HEALTH CARE FACILITY: Primary | ICD-10-CM

## 2023-11-02 DIAGNOSIS — E66.3 OVERWEIGHT (BMI 25.0-29.9): ICD-10-CM

## 2023-11-02 LAB
ANION GAP SERPL CALCULATED.3IONS-SCNC: 10 MMOL/L (ref 7–15)
BUN SERPL-MCNC: 15.4 MG/DL (ref 6–20)
CALCIUM SERPL-MCNC: 9.2 MG/DL (ref 8.6–10)
CHLORIDE SERPL-SCNC: 109 MMOL/L (ref 98–107)
CLUE CELLS: ABNORMAL
CREAT SERPL-MCNC: 1.19 MG/DL (ref 0.51–0.95)
DEPRECATED HCO3 PLAS-SCNC: 21 MMOL/L (ref 22–29)
EGFRCR SERPLBLD CKD-EPI 2021: 59 ML/MIN/1.73M2
GLUCOSE SERPL-MCNC: 94 MG/DL (ref 70–99)
POTASSIUM SERPL-SCNC: 4.1 MMOL/L (ref 3.4–5.3)
SODIUM SERPL-SCNC: 140 MMOL/L (ref 135–145)
TRICHOMONAS, WET PREP: ABNORMAL
WBC'S/HIGH POWER FIELD, WET PREP: ABNORMAL
YEAST, WET PREP: ABNORMAL

## 2023-11-02 PROCEDURE — 80048 BASIC METABOLIC PNL TOTAL CA: CPT | Performed by: NURSE PRACTITIONER

## 2023-11-02 PROCEDURE — 36415 COLL VENOUS BLD VENIPUNCTURE: CPT | Performed by: NURSE PRACTITIONER

## 2023-11-02 PROCEDURE — 99396 PREV VISIT EST AGE 40-64: CPT | Performed by: NURSE PRACTITIONER

## 2023-11-02 PROCEDURE — 87210 SMEAR WET MOUNT SALINE/INK: CPT | Performed by: NURSE PRACTITIONER

## 2023-11-02 PROCEDURE — 99213 OFFICE O/P EST LOW 20 MIN: CPT | Mod: 25 | Performed by: NURSE PRACTITIONER

## 2023-11-02 PROCEDURE — 87624 HPV HI-RISK TYP POOLED RSLT: CPT | Performed by: NURSE PRACTITIONER

## 2023-11-02 PROCEDURE — G0145 SCR C/V CYTO,THINLAYER,RESCR: HCPCS | Performed by: NURSE PRACTITIONER

## 2023-11-02 NOTE — PROGRESS NOTES
"   SUBJECTIVE:   CC: Bernie is an 43 year old who presents for preventive health visit.       11/2/2023    11:06 AM   Additional Questions   Roomed by Adrienne WINSLOW MA   Accompanied by n/a         11/2/2023    11:06 AM   Patient Reported Additional Medications   Patient reports taking the following new medications none       History of Present Illness       Reason for visit:  Pap smear due / change of out IUD    She eats 4 or more servings of fruits and vegetables daily.She consumes 0 sweetened beverage(s) daily.She exercises with enough effort to increase her heart rate 9 or less minutes per day.  She exercises with enough effort to increase her heart rate 3 or less days per week. She is missing 1 dose(s) of medications per week.  She is not taking prescribed medications regularly due to remembering to take.    Additional provider notes: Patient presents in clinic for Papsmear.     IUD: had it placed just over 4 years ago. She has had BV more frequently since having the last IUD placed. She is going to schedule with a provider who can replace it.    Hx of UTI: usually twice a year. She uses [facility] and gets online abx and it goes away right away.     HTN: didn't take lisinopril and was \"dealing with x-\".     Allergies   Allergen Reactions    Bactrim [Sulfamethoxazole-Trimethoprim]     Seasonal Allergies     Sulfa Antibiotics        Current Outpatient Medications   Medication    lisinopril (ZESTRIL) 10 MG tablet    MIRENA 20 MCG/24HR IU IUD    topiramate (TOPAMAX) 50 MG tablet     No current facility-administered medications for this visit.       Past Medical History:   Diagnosis Date    ABNORMAL PAP SMEAR OF CERVIX NEC AND HPV 02/16/2006 2002 required cryotherapy    ASCUS of cervix with negative high risk HPV 05/05/2017    Diabetes (H)     gestational.    Hypertension     PONV (postoperative nausea and vomiting)         Have you ever done Advance Care Planning? (For example, a Health Directive, POLST, or " a discussion with a medical provider or your loved ones about your wishes): No, advance care planning information given to patient to review.  Patient declined advance care planning discussion at this time.    Social History     Tobacco Use    Smoking status: Never    Smokeless tobacco: Never    Tobacco comments:     no second hand smoke   Substance Use Topics    Alcohol use: Yes     Comment: rare about 0-1 drink per month             2019     8:30 AM   Alcohol Use   Prescreen: >3 drinks/day or >7 drinks/week? No     Reviewed orders with patient.  Reviewed health maintenance and updated orders accordingly - Yes      Breast Cancer Screening:  Any new diagnosis of family breast, ovarian, or bowel cancer? No    FHS-7:        No data to display                Mammogram Screening - Offered annual screening and updated Health Maintenance for mutual plan based on risk factor consideration  Pertinent mammograms are reviewed under the imaging tab.    History of abnormal Pap smear: NO - age 30-65 PAP every 5 years with negative HPV co-testing recommended      Latest Ref Rng & Units 2017    10:02 AM 2017    10:00 AM 2014    12:00 AM   PAP / HPV   PAP (Historical)  ASC-US   NIL    HPV 16 DNA NEG  Negative     HPV 18 DNA NEG  Negative     Other HR HPV NEG  Negative       Reviewed and updated as needed this visit by clinical staff    Allergies  Meds              Reviewed and updated as needed this visit by Provider                 Past Medical History:   Diagnosis Date    ABNORMAL PAP SMEAR OF CERVIX NEC AND HPV 2006 required cryotherapy    ASCUS of cervix with negative high risk HPV 2017    Diabetes (H)     gestational.    Hypertension     PONV (postoperative nausea and vomiting)       Past Surgical History:   Procedure Laterality Date    APPENDECTOMY  10/29/15    ENT SURGERY      surgery for displaced nose    GYN SURGERY          LAPAROSCOPIC GASTRIC ADJUSTABLE BANDING   "2012    Procedure: LAPAROSCOPIC GASTRIC ADJUSTABLE BANDING;  LAPAROSCOPIC GASTRIC ADJUSTABLE BANDING ;  Surgeon: Sina Day MD;  Location: [facility] OR    MAMMOPLASTY REDUCTION BILATERAL       OB History    Para Term  AB Living   1 1 0 0 0 1   SAB IAB Ectopic Multiple Live Births   0 0 0 0 0      # Outcome Date GA Lbr Sid/2nd Weight Sex Delivery Anes PTL Lv   1 Para                Review of Systems  CONSTITUTIONAL: NEGATIVE for fever, chills, change in weight  INTEGUMENTARU/SKIN: NEGATIVE for worrisome rashes, moles or lesions  EYES: NEGATIVE for vision changes or irritation  ENT: NEGATIVE for ear, mouth and throat problems  RESP: NEGATIVE for significant cough or SOB  BREAST: NEGATIVE for masses, tenderness or discharge  BREAST: hx breast reduction surgery  CV: NEGATIVE for chest pain, palpitations or peripheral edema  GI: NEGATIVE for nausea, abdominal pain, heartburn, or change in bowel habits  : NEGATIVE for unusual urinary or vaginal symptoms. Periods are regular.  MUSCULOSKELETAL: NEGATIVE for significant arthralgias or myalgia  NEURO: NEGATIVE for weakness, dizziness or paresthesias  PSYCHIATRIC: NEGATIVE for changes in mood or affect     OBJECTIVE:   BP (!) 137/95 (BP Location: Right arm, Patient Position: Chair, Cuff Size: Adult Regular)   Pulse 74   Temp 99  F (37.2  C) (Oral)   Resp 18   Ht 1.66 m (5' 5.35\")   Wt 81.2 kg (179 lb)   SpO2 100%   BMI 29.47 kg/m    Physical Exam  GENERAL: healthy, alert and no distress  EYES: Eyes grossly normal to inspection, PERRL and conjunctivae and sclerae normal  HENT: ear canals and TM's normal, nose and mouth without ulcers or lesions  NECK: no adenopathy, no asymmetry, masses, or scars and thyroid normal to palpation  RESP: lungs clear to auscultation - no rales, rhonchi or wheezes  BREAST: normal without masses, tenderness or nipple discharge and no palpable axillary masses or adenopathy  BREAST: scar from previous breast reduction, " lateral scar tissue present, nothing new per patient  CV: regular rate and rhythm, normal S1 S2, no S3 or S4, no murmur, click or rub, no peripheral edema and peripheral pulses strong  ABDOMEN: soft, nontender, no hepatosplenomegaly, no masses and bowel sounds normal   (female): normal female external genitalia, normal urethral meatus, vaginal mucosa pink, moist, well rugated, and normal cervix/adnexa/uterus without masses; moderate amount of thick creamy discharge  MS: no gross musculoskeletal defects noted, no edema  SKIN: no suspicious lesions or rashes  NEURO: Normal strength and tone, mentation intact and speech normal  PSYCH: mentation appears normal, affect normal/bright    Diagnostic Test Results:  Labs reviewed in Epic    ASSESSMENT/PLAN:       ICD-10-CM    1. Routine general medical examination at a health care facility  Z00.00       2. Vaginal discharge  N89.8 Wet prep - Clinic Collect      3. Benign essential hypertension  I10       4. Overweight (BMI 25.0-29.9)  E66.3       5. Abnormal laboratory test result  R89.9 Basic metabolic panel  (Ca, Cl, CO2, Creat, Gluc, K, Na, BUN)      6. Cervical cancer screening  Z12.4 Pap Screen with HPV - recommended age 30 - 65 years      7. Visit for screening mammogram  Z12.31 MA SCREENING DIGITAL BILAT - Future  (s+30)      -Repeat BMP today to recheck kidney function  -Vaginal discharge noted during Pap. Given BV hx, wet prep done today.   -HTN: elevated today, but she did not take her lisinopril this morning and she has been dealing with her ex- and it has been stressful today. She will take it.  -Overweight: she states she is doing really well on the topamax. She was recently prescribed it and really likes it.       Patient has been advised of split billing requirements and indicates understanding: No      COUNSELING:  Reviewed preventive health counseling, as reflected in patient instructions       Regular exercise       Healthy diet/nutrition        "Immunizations  Declined: Hepatitis B due to Other patient preference          BMI:   Estimated body mass index is 29.47 kg/m  as calculated from the following:    Height as of this encounter: 1.66 m (5' 5.35\").    Weight as of this encounter: 81.2 kg (179 lb).   Weight management plan: Discussed healthy diet and exercise guidelines      She reports that she has never smoked. She has never used smokeless tobacco.        Viki Daniel DNP  [facility]  "

## 2023-11-02 NOTE — TELEPHONE ENCOUNTER
"Requested Prescriptions   Pending Prescriptions Disp Refills     propranolol ER (INDERAL LA) 60 MG 24 hr capsule [Pharmacy Med Name: PROPRANOLOL ER 60 MG CAPSULE]  Last Written Prescription Date:  1/9/2019  Last Fill Quantity: 30 capsule,  # refills: 3   Last office visit: 8/19/2019 with prescribing provider:  OLAF Samson   Future Office Visit:   Next 5 appointments (look out 90 days)    Sep 10, 2019  8:20 AM CDT  SHORT with Madhav Samson DO  [facility] ([address] [address]  [phone]          30 capsule 3     Sig: TAKE 1 CAPSULE BY MOUTH EVERY DAY       Beta-Blockers Protocol Failed - 8/24/2019  8:34 AM        Failed - Blood pressure under 140/90 in past 12 months     BP Readings from Last 3 Encounters:   08/19/19 (!) 132/90   07/22/19 130/87   01/07/19 (!) 126/92             Passed - Patient is age 6 or older        Passed - Recent (12 mo) or future (30 days) visit within the authorizing provider's specialty     Patient had office visit in the last 12 months or has a visit in the next 30 days with authorizing provider or within the authorizing provider's specialty.  See \"Patient Info\" tab in inbasket, or \"Choose Columns\" in Meds & Orders section of the refill encounter.              Passed - Medication is active on med list          "
Please see refill request now that appt is made  [facility]  Team 3 Coordinator       
Pt has appointment 09/10/19 with Chapin  [facility]  Team 3 Coordinator       
Reached out to patient to discuss refill, as last was in January for only 4 months worth. Has patient been taking consistently? Did she obtain a refill from another provider/specialist? Once we have more information, can route to PCP for refill due to failed protocol with BP out of range.  Patient/family was instructed to return call to [facility] clinic RN directly on the RN Call back line at [phone].     Marni Parikh RN    
Spoke with patient who states she has been taking medication daily as directed this whole time and is odd that our record only shows 4 months worth because she has not run out. She will need a refill before appt thought. Michelle refill given to get to appointment.    Marni Parikh RN    
TC,    Please call patient to schedule a BP check on the ancillary schedule and then route to RN for refill request.    Erick Cárdenas RN  
You can access the FollowMyHealth Patient Portal offered by [facility] by registering at the following website: [email or web address]. By joining [employer]’s FollowMyHealth portal, you will also be able to view your health information using other applications (apps) compatible with our system.

## 2023-11-07 LAB
BKR LAB AP GYN ADEQUACY: NORMAL
BKR LAB AP GYN INTERPRETATION: NORMAL
BKR LAB AP HPV REFLEX: NORMAL
BKR LAB AP PREVIOUS ABNORMAL: NORMAL
PATH REPORT.COMMENTS IMP SPEC: NORMAL
PATH REPORT.COMMENTS IMP SPEC: NORMAL
PATH REPORT.RELEVANT HX SPEC: NORMAL

## 2023-11-09 LAB
HUMAN PAPILLOMA VIRUS 16 DNA: NEGATIVE
HUMAN PAPILLOMA VIRUS 18 DNA: NEGATIVE
HUMAN PAPILLOMA VIRUS FINAL DIAGNOSIS: ABNORMAL
HUMAN PAPILLOMA VIRUS OTHER HR: POSITIVE

## 2023-11-10 ENCOUNTER — PATIENT OUTREACH (OUTPATIENT)
Dept: FAMILY MEDICINE | Facility: CLINIC | Age: 43
End: 2023-11-10
Payer: COMMERCIAL

## 2023-11-10 DIAGNOSIS — R87.610 ASCUS OF CERVIX WITH NEGATIVE HIGH RISK HPV: Primary | ICD-10-CM

## 2023-11-14 ENCOUNTER — TELEPHONE (OUTPATIENT)
Dept: FAMILY MEDICINE | Facility: CLINIC | Age: 43
End: 2023-11-14
Payer: COMMERCIAL

## 2023-11-14 NOTE — TELEPHONE ENCOUNTER
Patient is scheduled for 11/16/23 for an IUD replacement - this should be scheduled as a 60 min appt. Can you get this changed to the appropriate length? The patient following Bernie is a video call -- this could be moved to Thursday afternoon in order to keep Bernie's appt at this same time.     Thanks    Michelle Ortega DO

## 2023-11-16 ENCOUNTER — OFFICE VISIT (OUTPATIENT)
Dept: FAMILY MEDICINE | Facility: CLINIC | Age: 43
End: 2023-11-16
Payer: COMMERCIAL

## 2023-11-16 VITALS
TEMPERATURE: 98.6 F | WEIGHT: 176 LBS | DIASTOLIC BLOOD PRESSURE: 84 MMHG | HEIGHT: 65 IN | BODY MASS INDEX: 29.32 KG/M2 | SYSTOLIC BLOOD PRESSURE: 120 MMHG | HEART RATE: 92 BPM

## 2023-11-16 DIAGNOSIS — Z30.433 ENCOUNTER FOR REMOVAL AND REINSERTION OF INTRAUTERINE CONTRACEPTIVE DEVICE: Primary | ICD-10-CM

## 2023-11-16 PROCEDURE — 58301 REMOVE INTRAUTERINE DEVICE: CPT | Performed by: STUDENT IN AN ORGANIZED HEALTH CARE EDUCATION/TRAINING PROGRAM

## 2023-11-16 PROCEDURE — 58300 INSERT INTRAUTERINE DEVICE: CPT | Performed by: STUDENT IN AN ORGANIZED HEALTH CARE EDUCATION/TRAINING PROGRAM

## 2023-11-16 NOTE — PROGRESS NOTES
..  IUD  INSERTION PROCEDURE   Bernie Lara who presents for *** IUD insertion. Indication for IUD insertion is contraception. Patient's last menstrual period was No LMP recorded. (Menstrual status: IUD).. . The patient is currently using OCPs for contraception. She is in a monogamous sexual relationship.   Lab Results   Component Value Date    PAP ASC-US 05/05/2017      pregnancy test :***  A complete discussion of the risks and benefits of IUD use and the details of the insertion procedure was held with the patient.   All questions were answered. A consent form was signed.   Prior to the beginning of the procedure the team paused to verify the patient's identity, as well as the procedure to be performed and the site. All equipment required was ready and available. The patient was positioned appropriately.   IUD Lot # ***  NDC # 76993-844-35 USE ONLY FOR MIRENA  NDC # 27428-335-43 USE ONLY FOR PARAGUARD  The patient was placed in low lithotomy. A bimanual exam was performed and the uterus noted to be ***. A speculum was placed and the cervix swabbed with Betadine. A tenaculum was placed on the anterior cervical lip. The uterus sounded to ** cm. The Mirena IUD was placed to the uterine fundus without difficulty. The strings were cut to 3 cms. The tenaculum was removed and hemostasis was ensured. The speculum was removed. The patient tolerated the procedure well.   PLAN:   The patient was asked to contact the clinic for any fever/chills/severe pelvic or abdominal pain or heavy bleeding. She was instructed in how to palpate her IUD strings.   FOLLOW-UP:   She was asked to follow up in one month for IUD check, and for her routine annual screening.     Michelle Ortega DO

## 2023-11-16 NOTE — PROGRESS NOTES
SUBJECTIVE:    Is a pregnancy test required: No.  Was a consent obtained?  Yes    Subjective: Bernie Lara is a 43 year old  presents for IUD and desires Mirena type IUD.  She requests removal of the IUD because  dysmenorrhea and return of menstrual cycles    Patient has been given the opportunity to ask questions about all forms of birth control, including all options appropriate for Bernie Lara. Discussed that no method of birth control, except abstinence is 100% effective against pregnancy or sexually transmitted infection.     Bernie Lara understands she may have the IUD removed at any time. IUD should be removed by a health care provider and the current IUD will be removed today.    The entire removal and insertion procedure was reviewed with the patient, including care after placement.    Today's PHQ-2 Score:       2023     8:20 AM   PHQ-2 (  [employer])   Q1: Little interest or pleasure in doing things 0   Q2: Feeling down, depressed or hopeless 0   PHQ-2 Score 0   Q1: Little interest or pleasure in doing things Not at all   Q2: Feeling down, depressed or hopeless Not at all   PHQ-2 Score 0       PROCEDURE:    Not pre-medicated  A speculum exam was performed and the cervix was visualized. The IUD string was visualized. Using ring forceps, the string  was grasped and the IUD removed intact.    Under sterile technique, cervix was visualized with speculum and prepped with Betadine solution swab x 3. Tenaculum was placed for stability. The uterus was gently straightened and sounded to 8.0 cm without difficulty. IUD prepared for placement, and IUD inserted according to 's instructions with resistance at the internal os at 4cm. Due to resistance assistance was requested and Dr Samson assisted in insertion of IUD . IUD was deployed at the fundus. The strings were visualized and trimmed to 2.5 cm from the external os. Positioning confirmed with ultrasound. Tenaculum was removed and  hemostasis noted. Speculum removed.  Patient tolerated procedure well.    Lot # [id number]  Exp: Oct 2025    EBL: minimal    Complications: none      POST PROCEDURE:    Given 's handouts, including when to have IUD removed, list of danger s/sx, side effects and follow up recommended. Encouraged condom use for prevention of STD. Advised to call for any fever, for prolonged or severe pain or bleeding, abnormal vaginal dischage, or unable to palpate strings. She was advised to use pain medications (ibuprofen) as needed for mild to moderate pain. Advised to follow-up in clinic in 4-6 weeks for IUD string check if unable to find strings or as directed by provider.     Michelle Ortega, DO

## 2023-11-18 DIAGNOSIS — I10 BENIGN ESSENTIAL HYPERTENSION: ICD-10-CM

## 2023-11-20 RX ORDER — LISINOPRIL 10 MG/1
10 TABLET ORAL DAILY
Qty: 90 TABLET | Refills: 0 | Status: SHIPPED | OUTPATIENT
Start: 2023-11-20

## 2023-11-25 ENCOUNTER — HEALTH MAINTENANCE LETTER (OUTPATIENT)
Age: 43
End: 2023-11-25

## 2023-12-18 ENCOUNTER — OFFICE VISIT (OUTPATIENT)
Dept: FAMILY MEDICINE | Facility: CLINIC | Age: 43
End: 2023-12-18
Payer: COMMERCIAL

## 2023-12-18 VITALS
TEMPERATURE: 97.6 F | WEIGHT: 175 LBS | SYSTOLIC BLOOD PRESSURE: 128 MMHG | DIASTOLIC BLOOD PRESSURE: 86 MMHG | HEART RATE: 70 BPM | HEIGHT: 65 IN | BODY MASS INDEX: 29.16 KG/M2 | OXYGEN SATURATION: 100 %

## 2023-12-18 DIAGNOSIS — Z30.431 IUD CHECK UP: Primary | ICD-10-CM

## 2023-12-18 DIAGNOSIS — N89.8 VAGINAL DISCHARGE: ICD-10-CM

## 2023-12-18 LAB
CLUE CELLS: NORMAL
TRICHOMONAS, WET PREP: NORMAL
WBC'S/HIGH POWER FIELD, WET PREP: NORMAL
YEAST, WET PREP: NORMAL

## 2023-12-18 PROCEDURE — 87210 SMEAR WET MOUNT SALINE/INK: CPT | Performed by: STUDENT IN AN ORGANIZED HEALTH CARE EDUCATION/TRAINING PROGRAM

## 2023-12-18 PROCEDURE — 99213 OFFICE O/P EST LOW 20 MIN: CPT | Performed by: STUDENT IN AN ORGANIZED HEALTH CARE EDUCATION/TRAINING PROGRAM

## 2023-12-18 NOTE — PROGRESS NOTES
"  Assessment & Plan     IUD check up  Tolerating well; no concerns since insertion. Short strings but are visible. Plan for removal in 8 years.     Vaginal discharge  Check for BV - wet prep was negative.   - Wet prep - Clinic Collect      [facility] 43 year [facility], presenting for the following health issues:  No chief complaint on file.        12/18/2023    10:29 AM   Additional Questions   Accompanied by na         12/18/2023    10:29 AM   Patient Reported Additional Medications   Patient reports taking the following new medications none       History of Present Illness       Reason for visit:  Iud check up/follow up    She eats 4 or more servings of fruits and vegetables daily.She consumes 0 sweetened beverage(s) daily.She exercises with enough effort to increase her heart rate 9 or less minutes per day.  She exercises with enough effort to increase her heart rate 3 or less days per week. She is missing 1 dose(s) of medications per week.           Follow up from IUD Mirena insertion a month ago.   Had a lot of cramping the day of the insertion and for the first week but since then has felt well; no issues with pain/cramping. No bleeding since insertion. Had intercourse one week after insertion and had pain following that but otherwise no dyspareunia. Some concerns for BV; took boric acid recently       Review of Systems   Constitutional, HEENT, cardiovascular, pulmonary, gi and gu systems are negative, except as otherwise noted.      Objective    /86 (Patient Position: Sitting, Cuff Size: Adult Regular)   Pulse 70   Temp 97.6  F (36.4  C) (Oral)   Ht 1.651 m (5' 5\")   Wt 79.4 kg (175 lb)   SpO2 100%   BMI 29.12 kg/m    Body mass index is 29.12 kg/m .  Physical Exam   GENERAL: healthy, alert and no distress  RESP: lungs clear to auscultation - no rales, rhonchi or wheezes  CV: regular rate and rhythm, normal S1 S2, no S3 or S4, no " murmur, click or rub, no peripheral edema and peripheral pulses strong  ABDOMEN: soft, nontender, no hepatosplenomegaly, no masses and bowel sounds normal   (female): normal female external genitalia, normal urethral meatus, vaginal mucosa, normal cervix with short IUD strings with minimal discharge  MS: no gross musculoskeletal defects noted, no edema

## 2023-12-18 NOTE — COMMUNITY RESOURCES LIST (ENGLISH)
12/18/2023   [facility]  N/A  For questions about this resource list or additional care needs, please contact your primary care clinic or care manager.  Phone: [phone]   Email: N/A   Address: [facility] [place] [address]   Hours: N/A        Hotlines and Helplines       Hotline - Housing crisis  1  [facility] Distance: 10.47 miles      Phone/Virtual   [address] [place] [address]  Language: English  Hours: Mon - Sun Open 24 Hours   Phone: (395) 465-6056 Email: communications@[facility].org Website: https://[facility]-[place].org/oursaviourshousing/     2  [facility] Distance: 11.19 miles      Phone/Virtual   [address] [place] [address]  Language: English  Hours: Mon - Sun Open 24 Hours   Phone: (359) 116-6424 Email: info@[facility] Website: http://www.[facility].org          Housing       Coordinated Entry access point  3  [facility]  [facility] Distance: 2.88 miles      Phone/Virtual   [address] [address]  Language: English  Hours: Mon - Fri 8:30 AM - 12:00 PM , Mon - Fri 1:00 PM - 4:00 PM  Fees: Free   Phone: (500) 808-5118 Ext.2 Email: helene@[facility].[employer].org Website: https://www.[facility].org/usn/     4  [facility] - Coordinated Access to Housing and Shelter ([facility]) - Coordinated Access - Coordinated Entry access point Distance: 10.56 miles      In-Person, Phone/Virtual   [address] [place] [address]  Language: English  Hours: Mon - Fri 8:00 AM - 4:30 PM  Fees: Free   Phone: (286) 747-5786 Website: https://www.[facility]./residents/assistance-support/assistance/housing-services-support     Drop-in center or day shelter  5  [facility] Distance: 9.37 miles      In-Person   [address] [place] [address]  Language: English, Hmong, Rwandan, Turkish  Hours: Mon - Thu 8:30 AM - 4:30 PM , Sat -  Sun 9:00 AM - 12:00 PM  Fees: Free   Phone: (146) 556-9028 Email: info@[employer] Website: https://[employer].org/     6  [facility] Distance: 10.08 miles      In-Person   [address] [place] [address]  Language: English, Russian, Albanian  Hours: Mon - Sat 7:00 AM - 3:00 PM  Fees: Free, Self Pay   Phone: (787) 568-6422 Email: info@[facility] Website: https://www.[employer].org/locations/opportunity-center/     Housing search assistance  7  [facility] [employer] Distance: 5.9 miles      Phone/Virtual   [address] [place] [address]  Language: English, Albanian  Hours: Mon - Fri 9:00 AM - 5:00 PM  Fees: Free   Phone: (464) 907-4177 Email: services@[employer] Website: https://www.[employer]     8  [facility] Low-income Public Housing Distance: 8.9 miles      In-Person   [address] [place] [address]  Language: English  Hours: Mon 8:00 AM - 3:30 PM , Wed 8:00 AM - 3:30 PM , Fri 8:00 AM - 3:30 PM  Fees: Sliding Fee   Phone: (385) 222-5926 Email: anthonyha@[facility].org Website: http://www.[facility].org/     Shelter for families  9  [facility] Distance: 10.57 miles      In-Person   [address] [place] [address]  Language: English  Hours: Mon - Fri 3:00 PM - 9:00 AM , Sat - Sun Open 24 Hours  Fees: Free   Phone: (945) 518-9507 Ext.1 Website: https://www.[employer].org/2020/07/03/emergency-family-shelter/     Shelter for individuals  10  [facility] Distance: 9.63 miles      In-Person   [address] [place] [address]  Language: English  Hours: Mon - Fri 4:00 PM - 9:00 AM  Fees: Free   Phone: (940) 310-6683 Email: gaurav@[facility].org Website: https://[facility].org/northern/[facility]/     11  [facility]  Distance: 10.47 miles      In-Person   [address] [place] [address]  Language: English  Hours: Mon - Sun Open 24 Hours  Fees: Free   Phone: (228) 258-3828 Email: communications@[facility].org Website: https://[facility]-[place].org/oursaviourshousing/          Important Numbers & Websites       Emergency Services   911  [facility]   311  Poison Control   (526) 518-4746  Suicide Prevention Lifeline   (352) 347-6978 (TALK)  Child Abuse Hotline   (471) 690-7643 (4-A-Child)  Sexual Assault Hotline   (647) 736-3226 (HOPE)  National Runaway Safeline   (712) 392-5187 (RUNAWAY)  All-Options Talkline   (389) 604-4672  Substance Abuse Referral   (160) 752-7624 (HELP)

## 2024-01-21 DIAGNOSIS — E66.811 CLASS 1 OBESITY WITH SERIOUS COMORBIDITY AND BODY MASS INDEX (BMI) OF 30.0 TO 30.9 IN ADULT, UNSPECIFIED OBESITY TYPE: ICD-10-CM

## 2024-01-23 RX ORDER — TOPIRAMATE 50 MG/1
50 TABLET, FILM COATED ORAL DAILY
Qty: 90 TABLET | Refills: 1 | Status: SHIPPED | OUTPATIENT
Start: 2024-01-23 | End: 2024-08-13

## 2024-03-14 ENCOUNTER — VIRTUAL VISIT (OUTPATIENT)
Dept: FAMILY MEDICINE | Facility: CLINIC | Age: 44
End: 2024-03-14
Payer: COMMERCIAL

## 2024-03-14 DIAGNOSIS — L70.0 ACNE VULGARIS: Primary | ICD-10-CM

## 2024-03-14 DIAGNOSIS — E66.3 OVERWEIGHT (BMI 25.0-29.9): ICD-10-CM

## 2024-03-14 PROCEDURE — 99213 OFFICE O/P EST LOW 20 MIN: CPT | Mod: 95 | Performed by: STUDENT IN AN ORGANIZED HEALTH CARE EDUCATION/TRAINING PROGRAM

## 2024-03-14 RX ORDER — TRETINOIN 0.25 MG/G
CREAM TOPICAL AT BEDTIME
Qty: 45 G | Refills: 0 | Status: SHIPPED | OUTPATIENT
Start: 2024-03-14 | End: 2024-05-15

## 2024-03-14 RX ORDER — MINOCYCLINE HYDROCHLORIDE 100 MG/1
100 TABLET ORAL 2 TIMES DAILY
Qty: 60 TABLET | Refills: 2 | Status: SHIPPED | OUTPATIENT
Start: 2024-03-14 | End: 2024-09-10

## 2024-03-14 NOTE — PROGRESS NOTES
Bernie is a 43 year old who is being evaluated via a billable video visit.    How would you like to obtain your AVS? Alberharselena  If the video visit is dropped, the invitation should be resent by: Text to cell phone: [phone]  Will anyone else be joining your video visit? No      Assessment & Plan     Acne vulgaris  Tends to have hormonal acne with flares of acne around time of IUD change as this has happened previously. IUD replaced in November and has been having acne issues since then despite topical OTC treatments. We will start minocycline BID for 3 months then stop and use retin-A nightly as needed, but start every other day and increase as tolerated. Discussed skin irritation and photosensitivity, use sunscreen   - tretinoin (RETIN-A) 0.025 % external cream; Apply topically at bedtime  - minocycline (DYNACIN) 100 MG tablet; Take 1 tablet (100 mg) by mouth 2 times daily    Overweight (BMI 25.0-29.9)  Has had good success with topamax 50 mg. We will continue this for  now      Subjective   Bernie is a 43 year old, presenting for the following health issues:  Acne      3/14/2024     1:20 PM   Additional Questions   Roomed by Subha     History of Present Illness       Reason for visit:  I am having acne flare up and would like to talk about antibiotics to help get it to calm down    She eats 4 or more servings of fruits and vegetables daily.She consumes 0 sweetened beverage(s) daily.She exercises with enough effort to increase her heart rate 9 or less minutes per day.  She exercises with enough effort to increase her heart rate 3 or less days per week. She is missing 1 dose(s) of medications per week.       When IUD is changed she has acne flares. Using OTC creams and patches. Taking hormonal acne creams. Mainly on face, small amount on chest. Mainly on chin and cheeks. In past had hormonal acne.       Diet has been better controlled since starting the topamax. Initially was losing 1-2 pounds per week. Has  stalled some since christmas         Review of Systems  Constitutional, HEENT, cardiovascular, pulmonary, gi and gu systems are negative, except as otherwise noted.      Objective           Vitals:  No vitals were obtained today due to virtual visit.    Physical Exam   GENERAL: alert and no distress  EYES: Eyes grossly normal to inspection.  No discharge or erythema, or obvious scleral/conjunctival abnormalities.  RESP: No audible wheeze, cough, or visible cyanosis.    SKIN: Visible skin clear. No significant rash, abnormal pigmentation or lesions.  NEURO: Cranial nerves grossly intact.  Mentation and speech appropriate for age.  PSYCH: Appropriate affect, tone, and pace of words          Video-Visit Details    Type of service:  Video Visit   Originating Location (pt. Location): Home    Distant Location (provider location):  On-site  Platform used for Video Visit: Devin  Signed Electronically by: Michelle Ortega DO

## 2024-05-14 DIAGNOSIS — L70.0 ACNE VULGARIS: ICD-10-CM

## 2024-05-15 RX ORDER — TRETINOIN 0.25 MG/G
CREAM TOPICAL
Qty: 45 G | Refills: 0 | Status: SHIPPED | OUTPATIENT
Start: 2024-05-15

## 2024-06-22 ENCOUNTER — HEALTH MAINTENANCE LETTER (OUTPATIENT)
Age: 44
End: 2024-06-22

## 2024-08-10 DIAGNOSIS — E66.811 CLASS 1 OBESITY WITH SERIOUS COMORBIDITY AND BODY MASS INDEX (BMI) OF 30.0 TO 30.9 IN ADULT, UNSPECIFIED OBESITY TYPE: ICD-10-CM

## 2024-08-13 RX ORDER — TOPIRAMATE 50 MG/1
50 TABLET, FILM COATED ORAL DAILY
Qty: 90 TABLET | Refills: 1 | Status: SHIPPED | OUTPATIENT
Start: 2024-08-13

## 2024-09-06 ENCOUNTER — VIRTUAL VISIT (OUTPATIENT)
Dept: FAMILY MEDICINE | Facility: CLINIC | Age: 44
End: 2024-09-06
Payer: COMMERCIAL

## 2024-09-06 DIAGNOSIS — R22.9 LOCALIZED SKIN MASS, LUMP, OR SWELLING: Primary | ICD-10-CM

## 2024-09-06 PROCEDURE — 99213 OFFICE O/P EST LOW 20 MIN: CPT | Mod: 95 | Performed by: PHYSICIAN ASSISTANT

## 2024-09-06 ASSESSMENT — ENCOUNTER SYMPTOMS
FEVER: 0
WOUND: 0
TROUBLE SWALLOWING: 0
SHORTNESS OF BREATH: 0
NECK PAIN: 0
DIAPHORESIS: 0
NUMBNESS: 0
CHILLS: 0
WEAKNESS: 0
SORE THROAT: 0
NECK STIFFNESS: 0
COUGH: 0

## 2024-09-06 NOTE — PATIENT INSTRUCTIONS
For further evaluation of the mass on your back, I have placed an order for an ultrasound.  Scheduling will call you to set up your ultrasound appointment.  As discussed, if you develop rapidly worsening symptoms such as fevers, rapid swelling, severe pain, neck stiffness, weakness/numbness/tingling in your arms, or any other severe symptoms, please go to the emergency department.

## 2024-09-06 NOTE — PROGRESS NOTES
Bernie is a 44 year old who is being evaluated via a billable video visit.    How would you like to obtain your AVS? [employer]Core  If the video visit is dropped, the invitation should be resent by: Text to cell phone: [phone]  Will anyone else be joining your video visit? No      Assessment & Plan     Localized skin mass, lump, or swelling  Patient is a 44-year-old female who presents to virtual visit due to mass overlying cervical spine x 3 months that became painful and enlarged in the last 1.5 months.  No systemic symptoms to suggest nerve compression or systemic infection.  Given location, will start with imaging for further evaluation of mass.  Based on imaging results, will determine best option for further evaluation/removal.  - US Head Neck Soft Tissue; Future    See Patient Instructions    Subjective   Bernie is a 44 year old, presenting for the following health issues:  Derm Problem      9/6/2024     7:51 AM   Additional Questions   Roomed by Completed by patient via [employer].     History of Present Illness       Reason for visit:  Cyst on the back of my neck  Symptom onset:  More than a month  Symptoms include:  Lump - starting to grow and now hurts  Symptom intensity:  Severe  Symptom progression:  Worsening  Had these symptoms before:  No  What makes it worse:  Touching - laying down  What makes it better:  No She is missing 1 dose(s) of medications per week.  She is not taking prescribed medications regularly due to remembering to take.     Starting approximately 3 months ago there was a small lump at the posterior aspect of neck, overlying spine, that did not bother patient. At the time it was the size of a dime. In the last 1.5 months the area has gotten larger-bigger than a quarter and is painful.  Patient notes lump is hard and becomes more painful with pressure.  No prior injuries or surgeries in this area.  (See photos sent in [employer] message.)    Review of Systems   Constitutional:  Negative for  chills, diaphoresis and fever.   HENT:  Negative for sore throat and trouble swallowing.    Respiratory:  Negative for cough and shortness of breath.    Musculoskeletal:  Negative for neck pain and neck stiffness.   Skin:  Negative for rash and wound.   Neurological:  Negative for weakness and numbness.           Objective           Vitals:  No vitals were obtained today due to virtual visit.    Physical Exam   GENERAL: alert and no distress  EYES: Eyes grossly normal to inspection.  No discharge or erythema, or obvious scleral/conjunctival abnormalities.  RESP: No audible wheeze, cough, or visible cyanosis.    SKIN: Posterior/inferior aspect of neck with raised mass overlying cervical spine.  No overlying wound, erythema, ecchymosis.  No drainage. (See Photos in MyChart Message)  NEURO: Cranial nerves grossly intact.  Mentation and speech appropriate for age.  PSYCH: Appropriate affect, tone, and pace of words          Video-Visit Details    Type of service:  Video Visit   Originating Location (pt. Location): Home    Distant Location (provider location):  On-site  Platform used for Video Visit: Devin  Signed Electronically by: Juliana Rodriguez PA-C

## 2024-09-07 ENCOUNTER — HOSPITAL ENCOUNTER (OUTPATIENT)
Dept: ULTRASOUND IMAGING | Facility: HOSPITAL | Age: 44
Discharge: HOME | End: 2024-09-07
Attending: PHYSICIAN ASSISTANT | Admitting: PHYSICIAN ASSISTANT
Payer: COMMERCIAL

## 2024-09-07 DIAGNOSIS — R22.9 LOCALIZED SKIN MASS, LUMP, OR SWELLING: ICD-10-CM

## 2024-09-07 PROCEDURE — 76536 US EXAM OF HEAD AND NECK: CPT

## 2024-09-08 ENCOUNTER — HOSPITAL ENCOUNTER (EMERGENCY)
Facility: HOSPITAL | Age: 44
Discharge: HOME | End: 2024-09-08
Attending: EMERGENCY MEDICINE | Admitting: EMERGENCY MEDICINE
Payer: COMMERCIAL

## 2024-09-08 VITALS
WEIGHT: 171.1 LBS | HEART RATE: 69 BPM | OXYGEN SATURATION: 97 % | DIASTOLIC BLOOD PRESSURE: 103 MMHG | TEMPERATURE: 98.1 F | SYSTOLIC BLOOD PRESSURE: 161 MMHG | BODY MASS INDEX: 28.47 KG/M2 | RESPIRATION RATE: 16 BRPM

## 2024-09-08 DIAGNOSIS — L72.9 INFECTED CYST OF SKIN: ICD-10-CM

## 2024-09-08 DIAGNOSIS — L08.9 INFECTED CYST OF SKIN: ICD-10-CM

## 2024-09-08 LAB — RADIOLOGIST FLAGS: NORMAL

## 2024-09-08 PROCEDURE — 99283 EMERGENCY DEPT VISIT LOW MDM: CPT | Mod: 25

## 2024-09-08 PROCEDURE — 10060 I&D ABSCESS SIMPLE/SINGLE: CPT

## 2024-09-08 RX ORDER — FLUCONAZOLE 150 MG/1
TABLET ORAL
Qty: 2 TABLET | Refills: 0 | Status: SHIPPED | OUTPATIENT
Start: 2024-09-08 | End: 2024-09-11

## 2024-09-08 RX ORDER — CEPHALEXIN 500 MG/1
500 CAPSULE ORAL 4 TIMES DAILY
Qty: 40 CAPSULE | Refills: 0 | Status: SHIPPED | OUTPATIENT
Start: 2024-09-08 | End: 2024-09-18

## 2024-09-08 RX ORDER — IBUPROFEN 800 MG/1
800 TABLET, FILM COATED ORAL EVERY 8 HOURS PRN
Qty: 24 TABLET | Refills: 0 | Status: SHIPPED | OUTPATIENT
Start: 2024-09-08 | End: 2024-09-16

## 2024-09-08 ASSESSMENT — COLUMBIA-SUICIDE SEVERITY RATING SCALE - C-SSRS
1. IN THE PAST MONTH, HAVE YOU WISHED YOU WERE DEAD OR WISHED YOU COULD GO TO SLEEP AND NOT WAKE UP?: NO
6. HAVE YOU EVER DONE ANYTHING, STARTED TO DO ANYTHING, OR PREPARED TO DO ANYTHING TO END YOUR LIFE?: NO
2. HAVE YOU ACTUALLY HAD ANY THOUGHTS OF KILLING YOURSELF IN THE PAST MONTH?: NO

## 2024-09-08 NOTE — ED NOTES
Discussed with patient about need for follow up and to start taking her BP medications again.  Patient verbalized understanding.

## 2024-09-08 NOTE — ED PROVIDER NOTES
EMERGENCY DEPARTMENT ENCOUNTER      NAME: Bernie Lara  AGE: 44 year old female  YOB: 1980  MRN: [id number]  EVALUATION DATE & TIME: 9/8/2024 11:32 AM    PCP: Michelle Ortega    ED PROVIDER: Juliana Hsu M.D.      Chief Complaint   Patient presents with    Cyst         FINAL IMPRESSION:  1. Infected cyst of skin          ED COURSE & MEDICAL DECISION MAKING:    ED Course as of 09/08/24 1203   Sun Sep 08, 2024   1132 Pt with months of cervical region tender swelling and s/p US done yesterday for it, US not yet read but US images independently interpreted and reviewed by me with apparent hypoechoic well circumscribed round region without vascular flow seen without connection to other structures, with solid intact tissue under lump seen on US with likely cyst at site. Patient presented to the ED without read of US with discomfort, not taking her medications currently for HTN, without draining from site recently. Examination shows slight redness, pt without other ROS and it was fluctuant suggestive of cyst vs. Infected cyst. Patient verbally consented for I&D and I&D performed wtih pus from site after sterile cleaning to site. Patient feels now improved, ok with plan to start keflex with ibuprofen, Rx diflucan to prefrred pharmacy in case yeast infection on antibiotic therapy and given surgery referral for future wound check and f/u, patient counselled to resume lisinopril for her known HTN and f/u with PMD for BP recheck and additionally for wound check. Patient discharged after being provided with extensive anticipatory guidance and given return precautions, importance of PMD follow-up emphasized.        Pertinent Labs & Imaging studies reviewed. (See chart for details)    Medical Decision Making  Obtained supplemental history:Supplemental history obtained?: No  Reviewed external records: External records reviewed?: Documented in chart and Other: 9/6/2024 Virtual Visit  Care impacted by  chronic illness:Hypertension  Care significantly affected by social determinants of health:N/A  Did you consider but not order tests?: Work up considered but not performed and documented in chart, if applicable  Did you interpret images independently?: Independent interpretation of ECG and images noted in documentation, when applicable.  Consultation discussion with other provider:Did you involve another provider (consultant, , pharmacy, etc.)?: No  Discharge. I prescribed additional prescription strength medication(s) as charted. See documentation for any additional details.        At the conclusion of the encounter I discussed the results of all of the tests and the disposition. The questions were answered. The patient or family acknowledged understanding and was agreeable with the care plan.     MEDICATIONS GIVEN IN THE EMERGENCY:  Medications - No data to display    NEW PRESCRIPTIONS STARTED AT TODAY'S ER VISIT  Discharge Medication List as of 9/8/2024 11:52 AM        START taking these medications    Details   cephALEXin (KEFLEX) 500 MG capsule Take 1 capsule (500 mg) by mouth 4 times daily for 10 days., Disp-40 capsule, R-0, E-Prescribe      fluconazole (DIFLUCAN) 150 MG tablet Take one tablet now, and one tablet in three days if you develop a yeast infection while taking antibiotics., Disp-2 tablet, R-0, E-Prescribe      ibuprofen (ADVIL/MOTRIN) 800 MG tablet Take 1 tablet (800 mg) by mouth every 8 hours as needed., Disp-24 tablet, R-0, E-Prescribe                =================================================================    HPI      Bernie Lara is a 44 year old female with PMHx of hypertension and bariatric surgery for obesity who presents to the ED today via independent means with a cyst.    Patient reports a lump on the back of her neck that started small about 3 months ago that has progressively became more swollen and painful in the past 2-3 weeks. Patient was seen on 9/6/24 virtually where  they referred her to get a US. She was instructed to present to the ED if it got more painful and swollen in relation to delayed US results over the weekend. She reports that since Friday the pain has become unbearable and it has progressively become more swollen. She states that she is unable to lay on it and has lost sleep. Patient denies any recent fevers. Patient has not taken anything for pain today. No other symptoms, complaints, or concerns at this time.     Per chart review:   patient was seen via a virtual visit for evaluation of 3 months of a neck lump. She was referred for a US of head and soft tissue, completed 24. Image has not been read at this time, no result viewable.     REVIEW OF SYSTEMS   All other systems reviewed and are negative except as noted above in HPI.    PAST MEDICAL HISTORY:  Past Medical History:   Diagnosis Date    ABNORMAL PAP SMEAR OF CERVIX NEC AND HPV 2006 required cryotherapy    ASCUS of cervix with negative high risk HPV 2017    Diabetes (H)     gestational.    Hypertension     PONV (postoperative nausea and vomiting)        PAST SURGICAL HISTORY:  Past Surgical History:   Procedure Laterality Date    APPENDECTOMY  10/29/15    ENT SURGERY      surgery for displaced nose    GYN SURGERY          LAPAROSCOPIC GASTRIC ADJUSTABLE BANDING  2012    Procedure: LAPAROSCOPIC GASTRIC ADJUSTABLE BANDING;  LAPAROSCOPIC GASTRIC ADJUSTABLE BANDING ;  Surgeon: Sina Day MD;  Location: [facility] OR    MAMMOPLASTY REDUCTION BILATERAL         CURRENT MEDICATIONS:    cephALEXin (KEFLEX) 500 MG capsule  fluconazole (DIFLUCAN) 150 MG tablet  ibuprofen (ADVIL/MOTRIN) 800 MG tablet  levonorgestrel (MIRENA) 52 MG (20 mcg/day) IUD  lisinopril (ZESTRIL) 10 MG tablet  minocycline (DYNACIN) 100 MG tablet  topiramate (TOPAMAX) 50 MG tablet  tretinoin (RETIN-A) 0.025 % external cream        ALLERGIES:  Allergies   Allergen Reactions    Bactrim  [Sulfamethoxazole-Trimethoprim]     Seasonal Allergies     Sulfa Antibiotics        FAMILY HISTORY:  Family History   Problem Relation Age of Onset    Hypertension Mother     Depression Mother     Thyroid Disease Father     Diabetes Maternal Grandmother     Hypertension Maternal Grandmother     Breast Cancer Maternal Grandmother         late 60's/70's    Diabetes Maternal Grandfather     Hypertension Maternal Grandfather     Thyroid Disease Paternal Grandmother     Thyroid Disease Sister     Thyroid Disease Sister     C.A.D. No family hx of     Cancer - colorectal No family hx of     Cerebrovascular Disease No family hx of     Asthma No family hx of        SOCIAL HISTORY:   Social History     Socioeconomic History    Marital status:      Spouse name: None    Number of children: 1    Years of education: None    Highest education level: None   Tobacco Use    Smoking status: Never     Passive exposure: Never    Smokeless tobacco: Never    Tobacco comments:     no second hand smoke   Vaping Use    Vaping status: Never Used   Substance and Sexual Activity    Alcohol use: Yes     Comment: rare about 0-1 drink per month    Drug use: No    Sexual activity: Yes     Partners: Male     Birth control/protection: I.U.D.   Other Topics Concern    Parent/sibling w/ CABG, MI or angioplasty before 65F 55M? No     Social Determinants of Health     Financial Resource Strain: Low Risk  (12/18/2023)    Financial Resource Strain     Within the past 12 months, have you or your family members you live with been unable to get utilities (heat, electricity) when it was really needed?: No   Food Insecurity: Low Risk  (12/18/2023)    Food Insecurity     Within the past 12 months, did you worry that your food would run out before you got money to buy more?: No     Within the past 12 months, did the food you bought just not last and you didn t have money to get more?: No   Transportation Needs: Low Risk  (12/18/2023)    Transportation  Needs     Within the past 12 months, has lack of transportation kept you from medical appointments, getting your medicines, non-medical meetings or appointments, work, or from getting things that you need?: No   Interpersonal Safety: Low Risk  (9/29/2023)    Interpersonal Safety     Do you feel physically and emotionally safe where you currently live?: Yes     Within the past 12 months, have you been hit, slapped, kicked or otherwise physically hurt by someone?: No     Within the past 12 months, have you been humiliated or emotionally abused in other ways by your partner or ex-partner?: No   Housing Stability: High Risk (12/18/2023)    Housing Stability     Do you have housing? : No     Are you worried about losing your housing?: No       VITALS:  Patient Vitals for the past 24 hrs:   BP Temp Temp src Pulse Resp SpO2 Weight   09/08/24 1153 (!) 161/103 -- -- 69 -- 97 % --   09/08/24 1126 (!) 184/119 98.1  F (36.7  C) Oral 72 16 99 % 77.6 kg (171 lb 1.6 oz)       PHYSICAL EXAM    GENERAL: Awake, alert.  In no acute distress.   HEENT: Normocephalic, atraumatic.  Pupils equal, round and reactive.  Conjunctiva normal.  EOMI.  NECK: No stridor or apparent deformity. Mid posterior neck fluctuant mass without drainage that is very tender.   EXTREMITIES: No lower extremity swelling or edema.    NEURO: Alert and oriented to person, place and time.  Cranial nerves grossly intact.  No focal motor deficit.  PSYCH: Normal mood and affect  SKIN: No rashes        PROCEDURES:    PROCEDURE: Incision and Drainage   INDICATIONS: Localized abscess   PROCEDURE PROVIDER: Dr Juliana Hsu   SITE: Posterior neck   MEDICATION: 0.5 mLs of 2% Lidocaine with epinephrine   NOTE: The area was prepped with chlorhexidine scrubbing and draped off in the usual sterile fashion.  Local anesthetic was injected subcutaneously with anesthesia effects demonstrated prior to proceeding.  The area of maximal fluctuance was opened with a # 11 Blade ([place])  using a Single Straight incision to allow for drainage. The abscess was drained of hazy water-like material and approximately 2 mLs of puss.  The abscess cavity was bluntly explored to separate any loculations. Sterile Band-Aid was placed into the abscess cavity.  A sterile dressing was placed over the area.   COMPLEXITY: Simple    Simple = single, furuncle, paronychia, superficial  Complex = multiple or abscess requiring probing, loculations, packing placement   COMPLICATIONS: Patient tolerated procedure well, without complication           I, Hannah Pryor, am serving as a scribe to document services personally performed by Dr. Juliana Hsu based on my observation and the provider's statements to me. I, Juliana Hsu MD attest that Hannah Pryor is acting in a scribe capacity, has observed my performance of the services and has documented them in accordance with my direction.       Juliana Hsu MD  09/08/24 8450

## 2024-09-08 NOTE — ED TRIAGE NOTES
Pt ambulatory to triage with complaint of cyst on back of neck that is becoming more painful and swollen. Pt states she was seen for an online doctors appointment Friday morning regarding the cyst. States she had a scheduled ultrasound Yesterday and has yet to hear back on results. States she was told to go to ER if pain or swelling worsened. Pt reports pain at rest of 6/10 but states increases to 10/10 with any movement. Hasn't taken anything for the pain  Pt reports Hx of hypertension. States she hasn't been taking her medications for awhile because her blood pressure had improved  Pt has soft bump on back of neck with reddened skin. Appears to have small white head on side of bump. Pt denies drainage from the cyst     Triage Assessment (Adult)       Row Name 09/08/24 1127          Triage Assessment    Airway WDL WDL        Respiratory WDL    Respiratory WDL WDL        Skin Circulation/Temperature WDL    Skin Circulation/Temperature WDL WDL        Cardiac WDL    Cardiac WDL WDL        Peripheral/Neurovascular WDL    Peripheral Neurovascular WDL WDL        Cognitive/Neuro/Behavioral WDL    Cognitive/Neuro/Behavioral WDL WDL

## 2024-09-08 NOTE — DISCHARGE INSTRUCTIONS
Please start taking your blood pressure medication again because your blood pressure is high in the ER today. Please also follow up with your primary care doctor this week so they can make sure your blood pressure is ok after resuming your blood pressure medication lisinopril.

## 2024-09-09 ENCOUNTER — TELEPHONE (OUTPATIENT)
Dept: FAMILY MEDICINE | Facility: CLINIC | Age: 44
End: 2024-09-09
Payer: COMMERCIAL

## 2024-09-09 NOTE — TELEPHONE ENCOUNTER
Patient called and RN relayed provider's message. Patient/family verbalized understanding.     Jessica Bower RN, BSN  [facility]: [place]

## 2024-09-09 NOTE — TELEPHONE ENCOUNTER
Attempted to call patient at home/mobile number, no answer, left message on voicemail; patient was instructed to return call to [facility] at [phone].    Appears we are simply making sure she is aware that the concern on the ultrasound has been addressed.    Chiara ALBA RN  [facility] Triage

## 2024-09-09 NOTE — TELEPHONE ENCOUNTER
Patient was evaluated in emergency department and area found to be infected cyst which was treated during ED visit and patient discharged on antibiotics.  No further evaluation indicated at this time.    Juliana Rodriguez PA-C on 9/9/2024 at 1:12 PM

## 2024-09-10 ENCOUNTER — OFFICE VISIT (OUTPATIENT)
Dept: SURGERY | Facility: CLINIC | Age: 44
End: 2024-09-10
Attending: EMERGENCY MEDICINE
Payer: COMMERCIAL

## 2024-09-10 DIAGNOSIS — L72.9 INFECTED CYST OF SKIN: ICD-10-CM

## 2024-09-10 DIAGNOSIS — L08.9 INFECTED CYST OF SKIN: ICD-10-CM

## 2024-09-10 PROCEDURE — 99202 OFFICE O/P NEW SF 15 MIN: CPT | Mod: 25 | Performed by: SURGERY

## 2024-09-10 PROCEDURE — 10060 I&D ABSCESS SIMPLE/SINGLE: CPT | Performed by: SURGERY

## 2024-09-10 NOTE — LETTER
9/10/2024      Bernie Lara  [address]  [place] [address]      Dear Colleague,    Thank you for referring your patient, Bernie Lara, to the [facility] [place]. Please see a copy of my visit note below.    HPI: Bernie Lara is a 44 year old female referred to see me by Michelle Ortega for an infected sebaceous cyst on the back of her neck.  She notes there has been a small nontender lump here for a number of months, that began enlarging in size over the past several weeks for becoming quite tender and painful over the weekend.  She presented to the emergency department 2 days ago where this was diagnosed as an infected sebaceous cyst.  An incision and drainage was performed and she was discharged home on antibiotics.  She is here today for follow-up.  Notes that the pain is slightly less, but she is still having drainage from the wound.    Allergies:Bactrim [sulfamethoxazole-trimethoprim], Seasonal allergies, and Sulfa antibiotics    Prior Medical History:   Past Medical History:   Diagnosis Date     ABNORMAL PAP SMEAR OF CERVIX NEC AND HPV 2006 required cryotherapy     ASCUS of cervix with negative high risk HPV 2017     Diabetes (H)     gestational.     Hypertension      PONV (postoperative nausea and vomiting)        Prior Surgical History:   Past Surgical History:   Procedure Laterality Date     APPENDECTOMY  10/29/15     ENT SURGERY      surgery for displaced nose     GYN SURGERY           LAPAROSCOPIC GASTRIC ADJUSTABLE BANDING  2012    Procedure: LAPAROSCOPIC GASTRIC ADJUSTABLE BANDING;  LAPAROSCOPIC GASTRIC ADJUSTABLE BANDING ;  Surgeon: Sina Day MD;  Location: [facility] OR     MAMMOPLASTY REDUCTION BILATERAL         CURRENT MEDS:  Prior to Admission medications    Medication Sig Start Date End Date Taking? Authorizing Provider   cephALEXin (KEFLEX) 500 MG capsule Take 1 capsule (500 mg) by mouth 4  times daily for 10 days. 9/8/24 9/18/24 Yes Juliana Hsu MD   ibuprofen (ADVIL/MOTRIN) 800 MG tablet Take 1 tablet (800 mg) by mouth every 8 hours as needed. 9/8/24 9/16/24 Yes Juliana Hsu MD   levonorgestrel (MIRENA) 52 MG (20 mcg/day) IUD 1 each by Intrauterine route once   Yes Michelle Ortega DO   lisinopril (ZESTRIL) 10 MG tablet TAKE 1 TABLET (10 MG) BY MOUTH DAILY *DUE FOR FOLLOW UP APPOINTMENT 11/20/23  Yes Michelle Ortega DO   topiramate (TOPAMAX) 50 MG tablet TAKE 1 TABLET BY MOUTH EVERY DAY 8/13/24  Yes Michelle Ortega DO   tretinoin (RETIN-A) 0.025 % external cream : Apply topically at bedtime - Topical 5/15/24  Yes Michelle Ortega DO   fluconazole (DIFLUCAN) 150 MG tablet Take one tablet now, and one tablet in three days if you develop a yeast infection while taking antibiotics.  Patient not taking: Reported on 9/10/2024 9/8/24 9/11/24  Juliana Hsu MD         Family History   Problem Relation Age of Onset     Hypertension Mother      Depression Mother      Thyroid Disease Father      Diabetes Maternal Grandmother      Hypertension Maternal Grandmother      Breast Cancer Maternal Grandmother         late 60's/70's     Diabetes Maternal Grandfather      Hypertension Maternal Grandfather      Thyroid Disease Paternal Grandmother      Thyroid Disease Sister      Thyroid Disease Sister      C.A.D. No family hx of      Cancer - colorectal No family hx of      Cerebrovascular Disease No family hx of      Asthma No family hx of         reports that she has never smoked. She has never been exposed to tobacco smoke. She has never used smokeless tobacco. She reports current alcohol use. She reports that she does not use drugs.    History   Smoking Status     Never   Smokeless Tobacco     Never       Review of Systems -    The 10 point review of systems is within normal limits except as noted in HPI.    There were no vitals taken for this visit.  0 lbs 0 oz  There is no  height or weight on file to calculate BMI.    EXAM:  GENERAL: Alert, cooperative, appears stated age  SKIN: Erythema, fluctuance and draining purulent fluid from the posterior neck.  Existing skin opening is roughly 4 to 5 mm in size.  Skin was cleansed with alcohol before being injected with 4 cc of local anesthetic.  We then enlarged the existing skin opening to be roughly 1.5 cm, and were able to express additional pus and sharply debrided with a scalpel visible segments of the underlying cyst wall.      LABS:  Lab Results   Component Value Date    HGB 14.4 09/10/2019    WBC 6.1 03/08/2016     03/08/2016    AST 9 04/03/2015    ALT 21 04/03/2015    ALKPHOS 42 04/03/2015    BILITOTAL 0.7 04/03/2015    LIPASE 81 07/27/2010           Assessment/Plan:   1. Infected cyst of skin          Bernie Lara is a 44 year old female with signs and symptoms consistent with an infected sebaceous cyst on the posterior neck.  With adequate drainage and antibiotics this should clear up over the coming days.  Given the partial excision of the cyst, this may or may not recur.  Should it recur, would then pursue excision of the cyst at some point in the future when not actively infected.    25 minutes spent on the date of the encounter doing chart review, patient visit, documentation, and procedure    Italo Beard MD ,MD  [facility] Department of Surgery      Again, thank you for allowing me to participate in the care of your patient.        Sincerely,        Italo Beard MD

## 2024-09-10 NOTE — PROGRESS NOTES
HPI: Bernie Lara is a 44 year old female referred to see me by Michelle Ortega for an infected sebaceous cyst on the back of her neck.  She notes there has been a small nontender lump here for a number of months, that began enlarging in size over the past several weeks for becoming quite tender and painful over the weekend.  She presented to the emergency department 2 days ago where this was diagnosed as an infected sebaceous cyst.  An incision and drainage was performed and she was discharged home on antibiotics.  She is here today for follow-up.  Notes that the pain is slightly less, but she is still having drainage from the wound.    Allergies:Bactrim [sulfamethoxazole-trimethoprim], Seasonal allergies, and Sulfa antibiotics    Prior Medical History:   Past Medical History:   Diagnosis Date    ABNORMAL PAP SMEAR OF CERVIX NEC AND HPV 2006 required cryotherapy    ASCUS of cervix with negative high risk HPV 2017    Diabetes (H)     gestational.    Hypertension     PONV (postoperative nausea and vomiting)        Prior Surgical History:   Past Surgical History:   Procedure Laterality Date    APPENDECTOMY  10/29/15    ENT SURGERY      surgery for displaced nose    GYN SURGERY          LAPAROSCOPIC GASTRIC ADJUSTABLE BANDING  2012    Procedure: LAPAROSCOPIC GASTRIC ADJUSTABLE BANDING;  LAPAROSCOPIC GASTRIC ADJUSTABLE BANDING ;  Surgeon: Sina Day MD;  Location: [facility] OR    MAMMOPLASTY REDUCTION BILATERAL         CURRENT MEDS:  Prior to Admission medications    Medication Sig Start Date End Date Taking? Authorizing Provider   cephALEXin (KEFLEX) 500 MG capsule Take 1 capsule (500 mg) by mouth 4 times daily for 10 days. 24 Yes Juliana Hsu MD   ibuprofen (ADVIL/MOTRIN) 800 MG tablet Take 1 tablet (800 mg) by mouth every 8 hours as needed. 24 Yes Juliana Hsu MD   levonorgestrel (MIRENA) 52 MG (20 mcg/day) IUD 1 each by Intrauterine route  once   Yes Michelle Ortega DO   lisinopril (ZESTRIL) 10 MG tablet TAKE 1 TABLET (10 MG) BY MOUTH DAILY *DUE FOR FOLLOW UP APPOINTMENT 11/20/23  Yes Michelle Ortega DO   topiramate (TOPAMAX) 50 MG tablet TAKE 1 TABLET BY MOUTH EVERY DAY 8/13/24  Yes Michelle Ortega DO   tretinoin (RETIN-A) 0.025 % external cream : Apply topically at bedtime - Topical 5/15/24  Yes Michelle Ortega DO   fluconazole (DIFLUCAN) 150 MG tablet Take one tablet now, and one tablet in three days if you develop a yeast infection while taking antibiotics.  Patient not taking: Reported on 9/10/2024 9/8/24 9/11/24  Juliana Hsu MD         Family History   Problem Relation Age of Onset    Hypertension Mother     Depression Mother     Thyroid Disease Father     Diabetes Maternal Grandmother     Hypertension Maternal Grandmother     Breast Cancer Maternal Grandmother         late 60's/70's    Diabetes Maternal Grandfather     Hypertension Maternal Grandfather     Thyroid Disease Paternal Grandmother     Thyroid Disease Sister     Thyroid Disease Sister     C.A.D. No family hx of     Cancer - colorectal No family hx of     Cerebrovascular Disease No family hx of     Asthma No family hx of         reports that she has never smoked. She has never been exposed to tobacco smoke. She has never used smokeless tobacco. She reports current alcohol use. She reports that she does not use drugs.    History   Smoking Status    Never   Smokeless Tobacco    Never       Review of Systems -    The 10 point review of systems is within normal limits except as noted in HPI.    There were no vitals taken for this visit.  0 lbs 0 oz  There is no height or weight on file to calculate BMI.    EXAM:  GENERAL: Alert, cooperative, appears stated age  SKIN: Erythema, fluctuance and draining purulent fluid from the posterior neck.  Existing skin opening is roughly 4 to 5 mm in size.  Skin was cleansed with alcohol before being injected with  4 cc of local anesthetic.  We then enlarged the existing skin opening to be roughly 1.5 cm, and were able to express additional pus and sharply debrided with a scalpel visible segments of the underlying cyst wall.      LABS:  Lab Results   Component Value Date    HGB 14.4 09/10/2019    WBC 6.1 03/08/2016     03/08/2016    AST 9 04/03/2015    ALT 21 04/03/2015    ALKPHOS 42 04/03/2015    BILITOTAL 0.7 04/03/2015    LIPASE 81 07/27/2010           Assessment/Plan:   1. Infected cyst of skin          Bernie Lara is a 44 year old female with signs and symptoms consistent with an infected sebaceous cyst on the posterior neck.  With adequate drainage and antibiotics this should clear up over the coming days.  Given the partial excision of the cyst, this may or may not recur.  Should it recur, would then pursue excision of the cyst at some point in the future when not actively infected.    25 minutes spent on the date of the encounter doing chart review, patient visit, documentation, and procedure    Italo Beard MD ,MD  [facility] Department of Surgery

## 2024-10-15 ENCOUNTER — PATIENT OUTREACH (OUTPATIENT)
Dept: FAMILY MEDICINE | Facility: CLINIC | Age: 44
End: 2024-10-15
Payer: COMMERCIAL

## 2024-12-30 DIAGNOSIS — I10 BENIGN ESSENTIAL HYPERTENSION: ICD-10-CM

## 2024-12-30 RX ORDER — LISINOPRIL 10 MG/1
10 TABLET ORAL DAILY
Qty: 90 TABLET | Refills: 0 | Status: SHIPPED | OUTPATIENT
Start: 2024-12-30 | End: 2024-12-31

## 2024-12-30 NOTE — TELEPHONE ENCOUNTER
A gap in medication is defined as 90 days from when medication should have run out. When gap is identified, route to corresponding triage team to contact patient and confirm reason for gap. Please instruct triage team to route reason for refill request and encounter to prescribing provider for review.  Mine Rojas RN

## 2024-12-30 NOTE — TELEPHONE ENCOUNTER
Routing message to provider.    Spoke with patient.    She never took prescription that was sent 11/2023 until she was in ER 9/8/2024.    Has been taking since as her BP was elevated in the ER.    Has appointment scheduled 2/3/2025 with PCP.    Willing to send in refill ?    Christine M Klisch, RN

## 2024-12-30 NOTE — TELEPHONE ENCOUNTER
Attempt #1 to call patient.     RN left voicemail and requested return call to [facility] at [phone].     Jessica Bower RN, BSN  [facility]: [place]

## 2024-12-31 RX ORDER — LISINOPRIL 10 MG/1
10 TABLET ORAL DAILY
Qty: 90 TABLET | Refills: 0 | Status: SHIPPED | OUTPATIENT
Start: 2024-12-31

## 2025-01-04 ENCOUNTER — HEALTH MAINTENANCE LETTER (OUTPATIENT)
Age: 45
End: 2025-01-04

## 2025-02-03 ENCOUNTER — OFFICE VISIT (OUTPATIENT)
Dept: FAMILY MEDICINE | Facility: CLINIC | Age: 45
End: 2025-02-03
Payer: COMMERCIAL

## 2025-02-03 VITALS
DIASTOLIC BLOOD PRESSURE: 78 MMHG | TEMPERATURE: 97.9 F | HEIGHT: 66 IN | SYSTOLIC BLOOD PRESSURE: 118 MMHG | WEIGHT: 166 LBS | HEART RATE: 88 BPM | RESPIRATION RATE: 16 BRPM | OXYGEN SATURATION: 98 % | BODY MASS INDEX: 26.68 KG/M2

## 2025-02-03 DIAGNOSIS — E66.811 CLASS 1 OBESITY WITH SERIOUS COMORBIDITY AND BODY MASS INDEX (BMI) OF 30.0 TO 30.9 IN ADULT, UNSPECIFIED OBESITY TYPE: ICD-10-CM

## 2025-02-03 DIAGNOSIS — L70.0 ACNE VULGARIS: ICD-10-CM

## 2025-02-03 DIAGNOSIS — I10 BENIGN ESSENTIAL HYPERTENSION: ICD-10-CM

## 2025-02-03 DIAGNOSIS — N95.1 PERIMENOPAUSAL: ICD-10-CM

## 2025-02-03 DIAGNOSIS — T83.32XA INTRAUTERINE CONTRACEPTIVE DEVICE THREADS LOST, INITIAL ENCOUNTER: ICD-10-CM

## 2025-02-03 DIAGNOSIS — Z98.84 BARIATRIC SURGERY STATUS: Chronic | ICD-10-CM

## 2025-02-03 DIAGNOSIS — Z30.2 ENCOUNTER FOR STERILIZATION: ICD-10-CM

## 2025-02-03 DIAGNOSIS — K58.1 IRRITABLE BOWEL SYNDROME WITH CONSTIPATION: ICD-10-CM

## 2025-02-03 DIAGNOSIS — Z12.31 VISIT FOR SCREENING MAMMOGRAM: ICD-10-CM

## 2025-02-03 DIAGNOSIS — Z12.4 CERVICAL CANCER SCREENING: ICD-10-CM

## 2025-02-03 DIAGNOSIS — Z13.220 SCREENING CHOLESTEROL LEVEL: ICD-10-CM

## 2025-02-03 DIAGNOSIS — Z00.00 ROUTINE GENERAL MEDICAL EXAMINATION AT A HEALTH CARE FACILITY: Primary | ICD-10-CM

## 2025-02-03 DIAGNOSIS — Z13.1 SCREENING FOR DIABETES MELLITUS: ICD-10-CM

## 2025-02-03 LAB
ALBUMIN SERPL BCG-MCNC: 4.5 G/DL (ref 3.5–5.2)
ALP SERPL-CCNC: 43 U/L (ref 40–150)
ALT SERPL W P-5'-P-CCNC: 10 U/L (ref 0–50)
ANION GAP SERPL CALCULATED.3IONS-SCNC: 11 MMOL/L (ref 7–15)
AST SERPL W P-5'-P-CCNC: 15 U/L (ref 0–45)
BILIRUB SERPL-MCNC: 0.4 MG/DL
BUN SERPL-MCNC: 19.4 MG/DL (ref 6–20)
CALCIUM SERPL-MCNC: 9.4 MG/DL (ref 8.8–10.4)
CHLORIDE SERPL-SCNC: 107 MMOL/L (ref 98–107)
CHOLEST SERPL-MCNC: 202 MG/DL
CREAT SERPL-MCNC: 1.27 MG/DL (ref 0.51–0.95)
EGFRCR SERPLBLD CKD-EPI 2021: 54 ML/MIN/1.73M2
FASTING STATUS PATIENT QL REPORTED: YES
FASTING STATUS PATIENT QL REPORTED: YES
GLUCOSE SERPL-MCNC: 84 MG/DL (ref 70–99)
HCO3 SERPL-SCNC: 21 MMOL/L (ref 22–29)
HDLC SERPL-MCNC: 54 MG/DL
HPV HR 12 DNA CVX QL NAA+PROBE: NEGATIVE
HPV16 DNA CVX QL NAA+PROBE: NEGATIVE
HPV18 DNA CVX QL NAA+PROBE: NEGATIVE
HUMAN PAPILLOMA VIRUS FINAL DIAGNOSIS: NORMAL
LDLC SERPL CALC-MCNC: 130 MG/DL
NONHDLC SERPL-MCNC: 148 MG/DL
POTASSIUM SERPL-SCNC: 4.2 MMOL/L (ref 3.4–5.3)
PROT SERPL-MCNC: 7 G/DL (ref 6.4–8.3)
SODIUM SERPL-SCNC: 139 MMOL/L (ref 135–145)
TRIGL SERPL-MCNC: 90 MG/DL
TSH SERPL DL<=0.005 MIU/L-ACNC: 0.98 UIU/ML (ref 0.3–4.2)

## 2025-02-03 PROCEDURE — 80053 COMPREHEN METABOLIC PANEL: CPT | Performed by: STUDENT IN AN ORGANIZED HEALTH CARE EDUCATION/TRAINING PROGRAM

## 2025-02-03 PROCEDURE — 90471 IMMUNIZATION ADMIN: CPT | Performed by: STUDENT IN AN ORGANIZED HEALTH CARE EDUCATION/TRAINING PROGRAM

## 2025-02-03 PROCEDURE — 36415 COLL VENOUS BLD VENIPUNCTURE: CPT | Performed by: STUDENT IN AN ORGANIZED HEALTH CARE EDUCATION/TRAINING PROGRAM

## 2025-02-03 PROCEDURE — 90746 HEPB VACCINE 3 DOSE ADULT IM: CPT | Performed by: STUDENT IN AN ORGANIZED HEALTH CARE EDUCATION/TRAINING PROGRAM

## 2025-02-03 PROCEDURE — 84443 ASSAY THYROID STIM HORMONE: CPT | Performed by: STUDENT IN AN ORGANIZED HEALTH CARE EDUCATION/TRAINING PROGRAM

## 2025-02-03 PROCEDURE — G0145 SCR C/V CYTO,THINLAYER,RESCR: HCPCS | Performed by: STUDENT IN AN ORGANIZED HEALTH CARE EDUCATION/TRAINING PROGRAM

## 2025-02-03 PROCEDURE — 87624 HPV HI-RISK TYP POOLED RSLT: CPT | Performed by: STUDENT IN AN ORGANIZED HEALTH CARE EDUCATION/TRAINING PROGRAM

## 2025-02-03 PROCEDURE — 99214 OFFICE O/P EST MOD 30 MIN: CPT | Mod: 25 | Performed by: STUDENT IN AN ORGANIZED HEALTH CARE EDUCATION/TRAINING PROGRAM

## 2025-02-03 PROCEDURE — 99396 PREV VISIT EST AGE 40-64: CPT | Mod: 25 | Performed by: STUDENT IN AN ORGANIZED HEALTH CARE EDUCATION/TRAINING PROGRAM

## 2025-02-03 PROCEDURE — 80061 LIPID PANEL: CPT | Performed by: STUDENT IN AN ORGANIZED HEALTH CARE EDUCATION/TRAINING PROGRAM

## 2025-02-03 RX ORDER — TOPIRAMATE 50 MG/1
50 TABLET, FILM COATED ORAL DAILY
Qty: 90 TABLET | Refills: 3 | Status: SHIPPED | OUTPATIENT
Start: 2025-02-03

## 2025-02-03 RX ORDER — LISINOPRIL 10 MG/1
10 TABLET ORAL DAILY
Qty: 90 TABLET | Refills: 3 | Status: SHIPPED | OUTPATIENT
Start: 2025-02-03

## 2025-02-03 RX ORDER — TRETINOIN 0.25 MG/G
CREAM TOPICAL
Qty: 45 G | Refills: 1 | Status: SHIPPED | OUTPATIENT
Start: 2025-02-03

## 2025-02-03 SDOH — HEALTH STABILITY: PHYSICAL HEALTH: ON AVERAGE, HOW MANY DAYS PER WEEK DO YOU ENGAGE IN MODERATE TO STRENUOUS EXERCISE (LIKE A BRISK WALK)?: 2 DAYS

## 2025-02-03 SDOH — HEALTH STABILITY: PHYSICAL HEALTH: ON AVERAGE, HOW MANY MINUTES DO YOU ENGAGE IN EXERCISE AT THIS LEVEL?: 20 MIN

## 2025-02-03 ASSESSMENT — SOCIAL DETERMINANTS OF HEALTH (SDOH): HOW OFTEN DO YOU GET TOGETHER WITH FRIENDS OR RELATIVES?: TWICE A WEEK

## 2025-02-03 NOTE — PROGRESS NOTES
Preventive Care Visit  [facility]  Michelle Ortega DO, Family Medicine  Feb 3, 2025      Assessment & Plan     Routine general medical examination at a health care facility  Vitals wnl. Declined vaccines     Visit for screening mammogram  - MA Screening Bilateral w/ Shaun; Future    Cervical cancer screening  - HPV and Gynecologic Cytology Panel - Recommended Age 30 - 65 Years    Benign essential hypertension  Chronic, BP well controlled on current lisinopril. Check BMP today   - lisinopril (ZESTRIL) 10 MG tablet; Take 1 tablet (10 mg) by mouth daily.    Acne vulgaris  Currently well controlled with IUD and tretinoin however does notice acne worsens around year 5 on mirena. We discussed duration of contraception for IUD vs other symptoms and ability to remove/change sooner than 8 years. Discussed alternative/additional treatments for acne if needed   - tretinoin (RETIN-A) 0.025 % external cream; : Apply topically at bedtime - Topical    Irritable bowel syndrome with constipation  Has had chronic issues with constipation, bloating, gas, abdominal pain that sounds consistent with IBS-C. Has not had adequate control with regular miralax use so uses suppositories or enemas. Recommend evaluating low-fodmap diet and we will do a trial of linzess.   - linaclotide (LINZESS) 72 MCG capsule; Take 1 capsule (72 mcg) by mouth every morning (before breakfast).  - TSH with free T4 reflex; Future  - TSH with free T4 reflex    S/P Adjustable Band baraitric surgery   - Comprehensive metabolic panel (BMP + Alb, Alk Phos, ALT, AST, Total. Bili, TP); Future  - Comprehensive metabolic panel (BMP + Alb, Alk Phos, ALT, AST, Total. Bili, TP)    Class 1 obesity with serious comorbidity and body mass index (BMI) of 30.0 to 30.9 in adult, unspecified obesity type  Has been on topamax with improvement in weight and decreased food thought. Has hx of lap band procedure about 10 years ago. Weight has reached a plateau  "on topamax so she recently started tirzepatide from online  compounding pharmacy. We discussed that this can worsen constipation symptoms. Too early to notice much weight loss effects.   - Comprehensive metabolic panel (BMP + Alb, Alk Phos, ALT, AST, Total. Bili, TP); Future  - topiramate (TOPAMAX) 50 MG tablet; Take 1 tablet (50 mg) by mouth daily.  - TSH with free T4 reflex; Future  - Comprehensive metabolic panel (BMP + Alb, Alk Phos, ALT, AST, Total. Bili, TP)  - TSH with free T4 reflex    Encounter for sterilization  Perimenopausal  Is interested in potential tubal ligation so we will refer for a surgical consult. Has IUD in place but IUD strings not visible today  - Ob/Gyn  Referral; Future    Screening cholesterol level  - Lipid panel reflex to direct LDL Fasting; Future  - Lipid panel reflex to direct LDL Fasting      Screening for diabetes mellitus  - Comprehensive metabolic panel (BMP + Alb, Alk Phos, ALT, AST, Total. Bili, TP); Future  - Comprehensive metabolic panel (BMP + Alb, Alk Phos, ALT, AST, Total. Bili, TP)    Intrauterine contraceptive device threads lost, initial encounter  IUD strings not visible during speculum exam today. Recommend obtaining US to confirm positioning.   - US Pelvic Complete with Transvaginal; Future    Patient has been advised of split billing requirements and indicates understanding: Yes        BMI  Estimated body mass index is 26.79 kg/m  as calculated from the following:    Height as of this encounter: 1.676 m (5' 6\").    Weight as of this encounter: 75.3 kg (166 lb).   Weight management plan: Patient referred to endocrine and/or weight management specialty Discussed healthy diet and exercise guidelines    Counseling  Appropriate preventive services were addressed with this patient via screening, questionnaire, or discussion as appropriate for fall prevention, nutrition, physical activity, Tobacco-use cessation, social engagement, weight loss and cognition.  " Checklist reviewing preventive services available has been given to the patient.  Reviewed patient's diet, addressing concerns and/or questions.   She is at risk for lack of exercise and has been provided with information to increase physical activity for the benefit of her well-being.   She is at risk for psychosocial distress and has been provided with information to reduce risk.           Wendy Gibbs is a 44 year old, presenting for the following:  Physical           History of Present Illness       Hypertension: She presents for follow up of hypertension.  She does not check blood pressure  regularly outside of the clinic. Outpatient blood pressures have not been over 140/90. She does not follow a low salt diet.      Weight check-  Started on some drops 3 weeks ago. Getting prescription from [facility].   Tirzepatide 30mg on dropper sublingual?   Constipation worse since then.   Prior to this was on topamax but weight loss had plateau for 8 months. Topamax had helped shut down her brain on food thoughts.   Decreased cravings and thoughts about food but is eating well  Down 2 lb since starting the med.     Discuss IBS- with constipation  Typically BM every 3-4 days. Has done daily miralax and it would mainly change the texture. Does  enemas and suppositories - more effective, typically using regularly. Can only go 2 out of 10 times on her own. Started taking digestive enzymes which helped a lot, decreases gas and bloating. Made her feel better after eating. Can feel like she needs to go but feels a disconnect with brain. Trying to avoid breads but hasn't found other causes. Has had more gas since having lap band. Doing foods to eat based on blood type.     Birthcontrol/Discuss perimenopause -  Discuss long term options  Acne acts up when its closer to her 5 year.   Mirena was replaced in November 2023   Feels perimenopausal - having trouble sleeping because of this.       Was on augment for UTI starting  1/12/25, had hematuria with this. Feels better now. Had two antibiotics this past year for UTI.       Lab Results   Component Value Date    A1C 5.1 09/29/2023    A1C 5.3 01/05/2012                           Health Care Directive  Patient does not have a Health Care Directive: Discussed advance care planning with patient; information given to patient to review.      2/3/2025   General Health   How would you rate your overall physical health? Good   Feel stress (tense, anxious, or unable to sleep) To some extent   (!) STRESS CONCERN      2/3/2025   Nutrition   Three or more servings of calcium each day? Yes   Diet: Breakfast skipped   How many servings of fruit and vegetables per day? (!) 2-3   How many sweetened beverages each day? 0-1         2/3/2025   Exercise   Days per week of moderate/strenous exercise 2 days   Average minutes spent exercising at this level 20 min   (!) EXERCISE CONCERN      2/3/2025   Social Factors   Frequency of gathering with friends or relatives Twice a week   Worry food won't last until get money to buy more No   Food not last or not have enough money for food? No   Do you have housing? (Housing is defined as stable permanent housing and does not include staying ouside in a car, in a tent, in an abandoned building, in an overnight shelter, or couch-surfing.) Yes   Are you worried about losing your housing? No   Lack of transportation? No   Unable to get utilities (heat,electricity)? No         2/3/2025   Dental   Dentist two times every year? Yes         2/3/2025   TB Screening   Were you born outside of the [place]? No         Today's PHQ-2 Score:       2/3/2025     8:15 AM   PHQ-2 ( 1999 [employer])   Q1: Little interest or pleasure in doing things 0   Q2: Feeling down, depressed or hopeless 1   PHQ-2 Score 1    Q1: Little interest or pleasure in doing things Not at all   Q2: Feeling down, depressed or hopeless Several days   PHQ-2 Score 1       Patient-reported           2/3/2025   Substance  Use   Alcohol more than 3/day or more than 7/wk No   Do you use any other substances recreationally? (!) CANNABIS PRODUCTS     Social History     Tobacco Use    Smoking status: Never     Passive exposure: Never    Smokeless tobacco: Never    Tobacco comments:     no second hand smoke   Vaping Use    Vaping status: Never Used   Substance Use Topics    Alcohol use: Yes     Comment: rare about 0-1 drink per month    Drug use: No          Mammogram Screening - Mammogram every 1-2 years updated in Health Maintenance based on mutual decision making        2/3/2025   STI Screening   New sexual partner(s) since last STI/HIV test? No     History of abnormal Pap smear: No - age 30-64 HPV with reflex Pap every 5 years recommended        Latest Ref Rng & Units 11/2/2023    11:40 AM 5/5/2017    10:02 AM 5/5/2017    10:00 AM   PAP / HPV   PAP  Negative for Intraepithelial Lesion or Malignancy (NILM)      PAP (Historical)   ASC-US     HPV 16 DNA Negative Negative   Negative    HPV 18 DNA Negative Negative   Negative    Other HR HPV Negative Positive   Negative      ASCVD Risk   The 10-year ASCVD risk score (Gladis ALICIA, et al., 2019) is: 0.9%    Values used to calculate the score:      Age: 44 years      Sex: Female      Is Non- : No      Diabetic: No      Tobacco smoker: No      Systolic Blood Pressure: 118 mmHg      Is BP treated: Yes      HDL Cholesterol: 47 mg/dL      Total Cholesterol: 192 mg/dL        2/3/2025   Contraception/Family Planning   Questions about contraception or family planning (!) YES         Reviewed and updated as needed this visit by Provider                    Past Medical History:   Diagnosis Date    ABNORMAL PAP SMEAR OF CERVIX NEC AND HPV 02/16/2006 2002 required cryotherapy    ASCUS of cervix with negative high risk HPV 05/05/2017    Diabetes (H)     gestational.    Hypertension     PONV (postoperative nausea and vomiting)      Past Surgical History:   Procedure  Laterality Date    APPENDECTOMY  10/29/15    ENT SURGERY      surgery for displaced nose    GYN SURGERY          LAPAROSCOPIC GASTRIC ADJUSTABLE BANDING  2012    Procedure: LAPAROSCOPIC GASTRIC ADJUSTABLE BANDING;  LAPAROSCOPIC GASTRIC ADJUSTABLE BANDING ;  Surgeon: Sina Day MD;  Location: [facility] OR    MAMMOPLASTY REDUCTION BILATERAL       OB History    Para Term  AB Living   1 1 0 0 0 1   SAB IAB Ectopic Multiple Live Births   0 0 0 0 0      # Outcome Date GA Lbr Sid/2nd Weight Sex Type Anes PTL Lv   1 Para              Lab work is in process  Labs reviewed in EPIC  BP Readings from Last 3 Encounters:   25 118/78   24 (!) 161/103   23 128/86    Wt Readings from Last 3 Encounters:   25 75.3 kg (166 lb)   24 77.6 kg (171 lb 1.6 oz)   23 79.4 kg (175 lb)                  Patient Active Problem List   Diagnosis    Acne    Hyperhidrosis of axilla    Obesity    CARDIOVASCULAR SCREENING; LDL GOAL LESS THAN 130    Melanocytic nevus    S/P Adjustable Band baraitric surgery     IUD (intrauterine device) in place    Abnormal mammogram    Overweight (BMI 25.0-29.9)    Postsurgical nonabsorption    ASCUS of cervix with negative high risk HPV    Benign essential hypertension    Elevated serum creatinine     Past Surgical History:   Procedure Laterality Date    APPENDECTOMY  10/29/15    ENT SURGERY      surgery for displaced nose    GYN SURGERY          LAPAROSCOPIC GASTRIC ADJUSTABLE BANDING  2012    Procedure: LAPAROSCOPIC GASTRIC ADJUSTABLE BANDING;  LAPAROSCOPIC GASTRIC ADJUSTABLE BANDING ;  Surgeon: Sina Day MD;  Location: [facility] OR    MAMMOPLASTY REDUCTION BILATERAL         Social History     Tobacco Use    Smoking status: Never     Passive exposure: Never    Smokeless tobacco: Never    Tobacco comments:     no second hand smoke   Substance Use Topics    Alcohol use: Yes     Comment: rare about 0-1 drink per month     Family History  "  Problem Relation Age of Onset    Hypertension Mother     Depression Mother     Thyroid Disease Father     Diabetes Maternal Grandmother     Hypertension Maternal Grandmother     Breast Cancer Maternal Grandmother         late 60's/70's    Diabetes Maternal Grandfather     Hypertension Maternal Grandfather     Thyroid Disease Paternal Grandmother     Thyroid Disease Sister     Thyroid Disease Sister     C.A.D. No family hx of     Cancer - colorectal No family hx of     Cerebrovascular Disease No family hx of     Asthma No family hx of          Current Outpatient Medications   Medication Sig Dispense Refill    linaclotide (LINZESS) 72 MCG capsule Take 1 capsule (72 mcg) by mouth every morning (before breakfast). 30 capsule 2    lisinopril (ZESTRIL) 10 MG tablet Take 1 tablet (10 mg) by mouth daily. 90 tablet 3    TIRZEPATIDE-WEIGHT MANAGEMENT SC Inject subcutaneously.      topiramate (TOPAMAX) 50 MG tablet Take 1 tablet (50 mg) by mouth daily. 90 tablet 3    tretinoin (RETIN-A) 0.025 % external cream : Apply topically at bedtime - Topical 45 g 1    levonorgestrel (MIRENA) 52 MG (20 mcg/day) IUD 1 each by Intrauterine route once       Allergies   Allergen Reactions    Bactrim [Sulfamethoxazole-Trimethoprim]     Seasonal Allergies     Sulfa Antibiotics          Review of Systems  Constitutional, HEENT, cardiovascular, pulmonary, gi and gu systems are negative, except as otherwise noted.     Objective    Exam  /78 (BP Location: Right arm, Cuff Size: Adult Regular)   Pulse 88   Temp 97.9  F (36.6  C) (Oral)   Resp 16   Ht 1.676 m (5' 6\")   Wt 75.3 kg (166 lb)   SpO2 98%   BMI 26.79 kg/m     Estimated body mass index is 26.79 kg/m  as calculated from the following:    Height as of this encounter: 1.676 m (5' 6\").    Weight as of this encounter: 75.3 kg (166 lb).    Physical Exam  GENERAL: alert and no distress  EYES: Eyes grossly normal to inspection, PERRL and conjunctivae and sclerae normal  HENT: ear " canals and TM's normal, nose and mouth without ulcers or lesions  NECK: no adenopathy, no asymmetry, masses, or scars  RESP: lungs clear to auscultation - no rales, rhonchi or wheezes  BREAST: normal without masses, tenderness or nipple discharge and no palpable axillary masses or adenopathy  CV: regular rate and rhythm, normal S1 S2, no S3 or S4, no murmur, click or rub, no peripheral edema  ABDOMEN: soft, nontender, no hepatosplenomegaly, no masses except for palpable gastric sleeve    (female) w/bimanual: normal female external genitalia, normal urethral meatus, normal vaginal mucosa, and normal cervix/adnexa/uterus without masses or discharge. IUD strings not visible   MS: no gross musculoskeletal defects noted, no edema  SKIN: no suspicious lesions or rashes  NEURO: Normal strength and tone, mentation intact and speech normal  PSYCH: mentation appears normal, affect normal/bright        Signed Electronically by: Michelle Ortega,

## 2025-02-03 NOTE — PATIENT INSTRUCTIONS
Preventive Care Advice   This is general advice given by our system to help you stay healthy. However, your care team may have specific advice just for you. Please talk to your care team about your preventive care needs.  Nutrition  Eat 5 or more servings of fruits and vegetables each day.  Try wheat bread, brown rice and whole grain pasta (instead of white bread, rice, and pasta).  Get enough calcium and vitamin D. Check the label on foods and aim for 100% of the RDA (recommended daily allowance).  Lifestyle  Exercise at least 150 minutes each week  (30 minutes a day, 5 days a week).  Do muscle strengthening activities 2 days a week. These help control your weight and prevent disease.  No smoking.  Wear sunscreen to prevent skin cancer.  Have a dental exam and cleaning every 6 months.  Yearly exams  See your health care team every year to talk about:  Any changes in your health.  Any medicines your care team has prescribed.  Preventive care, family planning, and ways to prevent chronic diseases.  Shots (vaccines)   HPV shots (up to age 26), if you've never had them before.  Hepatitis B shots (up to age 59), if you've never had them before.  COVID-19 shot: Get this shot when it's due.  Flu shot: Get a flu shot every year.  Tetanus shot: Get a tetanus shot every 10 years.  Pneumococcal, hepatitis A, and RSV shots: Ask your care team if you need these based on your risk.  Shingles shot (for age 50 and up)  General health tests  Diabetes screening:  Starting at age 35, Get screened for diabetes at least every 3 years.  If you are younger than age 35, ask your care team if you should be screened for diabetes.  Cholesterol test: At age 39, start having a cholesterol test every 5 years, or more often if advised.  Bone density scan (DEXA): At age 50, ask your care team if you should have this scan for osteoporosis (brittle bones).  Hepatitis C: Get tested at least once in your life.  STIs (sexually transmitted  infections)  Before age 24: Ask your care team if you should be screened for STIs.  After age 24: Get screened for STIs if you're at risk. You are at risk for STIs (including HIV) if:  You are sexually active with more than one person.  You don't use condoms every time.  You or a partner was diagnosed with a sexually transmitted infection.  If you are at risk for HIV, ask about PrEP medicine to prevent HIV.  Get tested for HIV at least once in your life, whether you are at risk for HIV or not.  Cancer screening tests  Cervical cancer screening: If you have a cervix, begin getting regular cervical cancer screening tests starting at age 21.  Breast cancer scan (mammogram): If you've ever had breasts, begin having regular mammograms starting at age 40. This is a scan to check for breast cancer.  Colon cancer screening: It is important to start screening for colon cancer at age 45.  Have a colonoscopy test every 10 years (or more often if you're at risk) Or, ask your provider about stool tests like a FIT test every year or Cologuard test every 3 years.  To learn more about your testing options, visit:   .  For help making a decision, visit:   https://bit.ly/zd26046.  Prostate cancer screening test: If you have a prostate, ask your care team if a prostate cancer screening test (PSA) at age 55 is right for you.  Lung cancer screening: If you are a current or former smoker ages 50 to 80, ask your care team if ongoing lung cancer screenings are right for you.  For informational purposes only. Not to replace the advice of your health care provider. Copyright   2023 [facility]. All rights reserved. Clinically reviewed by the [facility] Transitions Program. [employer] [id number] - REV 01/24.  Learning About Stress  What is stress?     Stress is your body's response to a hard situation. Your body can have a physical, emotional, or mental response. Stress is a fact of life for most people, and it affects  everyone differently. What causes stress for you may not be stressful for someone else.  A lot of things can cause stress. You may feel stress when you go on a job interview, take a test, or run a race. This kind of short-term stress is normal and even useful. It can help you if you need to work hard or react quickly. For example, stress can help you finish an important job on time.  Long-term stress is caused by ongoing stressful situations or events. Examples of long-term stress include long-term health problems, ongoing problems at work, or conflicts in your family. Long-term stress can harm your health.  How does stress affect your health?  When you are stressed, your body responds as though you are in danger. It makes hormones that speed up your heart, make you breathe faster, and give you a burst of energy. This is called the fight-or-flight stress response. If the stress is over quickly, your body goes back to normal and no harm is done.  But if stress happens too often or lasts too long, it can have bad effects. Long-term stress can make you more likely to get sick, and it can make symptoms of some diseases worse. If you tense up when you are stressed, you may develop neck, shoulder, or low back pain. Stress is linked to high blood pressure and heart disease.  Stress also harms your emotional health. It can make you soto, tense, or depressed. Your relationships may suffer, and you may not do well at work or school.  What can you do to manage stress?  You can try these things to help manage stress:   Do something active. Exercise or activity can help reduce stress. Walking is a great way to get started. Even everyday activities such as housecleaning or yard work can help.  Try yoga or estefanía chi. These techniques combine exercise and meditation. You may need some training at first to learn them.  Do something you enjoy. For example, listen to music or go to a movie. Practice your hobby or do volunteer  "work.  Meditate. This can help you relax, because you are not worrying about what happened before or what may happen in the future.  Do guided imagery. Imagine yourself in any setting that helps you feel calm. You can use online videos, books, or a teacher to guide you.  Do breathing exercises. For example:  From a standing position, bend forward from the waist with your knees slightly bent. Let your arms dangle close to the floor.  Breathe in slowly and deeply as you return to a standing position. Roll up slowly and lift your head last.  Hold your breath for just a few seconds in the standing position.  Breathe out slowly and bend forward from the waist.  Let your feelings out. Talk, laugh, cry, and express anger when you need to. Talking with supportive friends or family, a counselor, or a teddy leader about your feelings is a healthy way to relieve stress. Avoid discussing your feelings with people who make you feel worse.  Write. It may help to write about things that are bothering you. This helps you find out how much stress you feel and what is causing it. When you know this, you can find better ways to cope.  What can you do to prevent stress?  You might try some of these things to help prevent stress:  Manage your time. This helps you find time to do the things you want and need to do.  Get enough sleep. Your body recovers from the stresses of the day while you are sleeping.  Get support. Your family, friends, and community can make a difference in how you experience stress.  Limit your news feed. Avoid or limit time on social media or news that may make you feel stressed.  Do something active. Exercise or activity can help reduce stress. Walking is a great way to get started.  Where can you learn more?  Go to https://www.JasonDB.net/patiented  Enter N032 in the search box to learn more about \"Learning About Stress.\"  Current as of: October 24, 2023  Content Version: 14.3    2024 [employer]. "   Care instructions adapted under license by your healthcare professional. If you have questions about a medical condition or this instruction, always ask your healthcare professional. [employer] disclaims any warranty or liability for your use of this information.    Substance Use Disorder: Care Instructions  Overview     You can improve your life and health by stopping your use of alcohol or drugs. When you don't drink or use drugs, you may feel and sleep better. You may get along better with your family, friends, and coworkers. There are medicines and programs that can help with substance use disorder.  How can you care for yourself at home?  Here are some ways to help you stay sober and prevent relapse.  If you have been given medicine to help keep you sober or reduce your cravings, be sure to take it exactly as prescribed.  Talk to your doctor about programs that can help you stop using drugs or drinking alcohol.  Do not keep alcohol or drugs in your home.  Plan ahead. Think about what you'll say if other people ask you to drink or use drugs. Try not to spend time with people who drink or use drugs.  Use the time and money spent on drinking or drugs to do something that's important to you.  Preventing a relapse  Have a plan to deal with relapse. Learn to recognize changes in your thinking that lead you to drink or use drugs. Get help before you start to drink or use drugs again.  Try to stay away from situations, friends, or places that may lead you to drink or use drugs.  If you feel the need to drink alcohol or use drugs again, seek help right away. Call a trusted friend or family member. Some people get support from organizations such as Narcotics Anonymous or [employer] or from treatment facilities.  If you relapse, get help as soon as you can. Some people make a plan with another person that outlines what they want that person to do for them if they relapse. The plan usually includes how to  handle the relapse and who to notify in case of relapse.  Don't give up. Remember that a relapse doesn't mean that you have failed. Use the experience to learn the triggers that lead you to drink or use drugs. Then quit again. Recovery is a lifelong process. Many people have several relapses before they are able to quit for good.  Follow-up care is a key part of your treatment and safety. Be sure to make and go to all appointments, and call your doctor if you are having problems. It's also a good idea to know your test results and keep a list of the medicines you take.  When should you call for help?   Call 911  anytime you think you may need emergency care. For example, call if you or someone else:    Has overdosed or has withdrawal signs. Be sure to tell the emergency workers that you are or someone else is using or trying to quit using drugs. Overdose or withdrawal signs may include:  Losing consciousness.  Seizure.  Seeing or hearing things that aren't there (hallucinations).     Is thinking or talking about suicide or harming others.   Where to get help 24 hours a day, 7 days a week   If you or someone you know talks about suicide, self-harm, a mental health crisis, a substance use crisis, or any other kind of emotional distress, get help right away. You can:    Call the Suicide and Crisis Lifeline at 445.     Call 6-096-545-FJYN (1-847.407.7935).     Text HOME to 300346 to access the Crisis Text Line.   Consider saving these numbers in your phone.  Go to [employer] for more information or to chat online.  Call your doctor now or seek immediate medical care if:    You are having withdrawal symptoms. These may include nausea or vomiting, sweating, shakiness, and anxiety.   Watch closely for changes in your health, and be sure to contact your doctor if:    You have a relapse.     You need more help or support to stop.   Where can you learn more?  Go to https://www.healthwise.net/patiented  Enter H573 in the  "search box to learn more about \"Substance Use Disorder: Care Instructions.\"  Current as of: November 15, 2023  Content Version: 14.3    2024 [employer].   Care instructions adapted under license by your healthcare professional. If you have questions about a medical condition or this instruction, always ask your healthcare professional. [employer] disclaims any warranty or liability for your use of this information.       "

## 2025-02-05 LAB
BKR AP ASSOCIATED HPV REPORT: NORMAL
BKR LAB AP GYN ADEQUACY: NORMAL
BKR LAB AP GYN INTERPRETATION: NORMAL
BKR LAB AP PREVIOUS ABNORMAL: NORMAL
PATH REPORT.COMMENTS IMP SPEC: NORMAL
PATH REPORT.COMMENTS IMP SPEC: NORMAL
PATH REPORT.RELEVANT HX SPEC: NORMAL

## 2025-02-06 ENCOUNTER — PATIENT OUTREACH (OUTPATIENT)
Dept: FAMILY MEDICINE | Facility: CLINIC | Age: 45
End: 2025-02-06
Payer: COMMERCIAL

## 2025-02-06 PROBLEM — R87.610 ASCUS OF CERVIX WITH NEGATIVE HIGH RISK HPV: Status: ACTIVE | Noted: 2017-05-05

## 2025-02-07 ENCOUNTER — ANCILLARY PROCEDURE (OUTPATIENT)
Dept: ULTRASOUND IMAGING | Facility: CLINIC | Age: 45
End: 2025-02-07
Attending: STUDENT IN AN ORGANIZED HEALTH CARE EDUCATION/TRAINING PROGRAM
Payer: COMMERCIAL

## 2025-02-07 DIAGNOSIS — T83.32XA INTRAUTERINE CONTRACEPTIVE DEVICE THREADS LOST, INITIAL ENCOUNTER: ICD-10-CM

## 2025-02-07 PROCEDURE — 76856 US EXAM PELVIC COMPLETE: CPT | Mod: TC | Performed by: RADIOLOGY

## 2025-02-07 PROCEDURE — 76830 TRANSVAGINAL US NON-OB: CPT | Mod: TC | Performed by: RADIOLOGY

## 2025-02-18 ENCOUNTER — ANCILLARY PROCEDURE (OUTPATIENT)
Dept: MAMMOGRAPHY | Facility: CLINIC | Age: 45
End: 2025-02-18
Attending: STUDENT IN AN ORGANIZED HEALTH CARE EDUCATION/TRAINING PROGRAM
Payer: COMMERCIAL

## 2025-02-18 DIAGNOSIS — Z12.31 VISIT FOR SCREENING MAMMOGRAM: ICD-10-CM

## 2025-02-18 PROCEDURE — 77067 SCR MAMMO BI INCL CAD: CPT | Mod: TC | Performed by: RADIOLOGY

## 2025-02-18 PROCEDURE — 77063 BREAST TOMOSYNTHESIS BI: CPT | Mod: TC | Performed by: RADIOLOGY

## 2025-03-07 NOTE — PATIENT INSTRUCTIONS
If you have any questions regarding your visit, Please contact your care team.    To Schedule an Appointment 24/7  Call: [phone]  Women s Health  TELEPHONE NUMBER   Obed Munoz M.D.    [place]-Medical Assistant    Bob Baires-Surgery Scheduler  [place]- Tuesday-[place]                   7:30 a.m.-3:30 p.m.  Wednesday-[place]             8:00 a.m.-4:30 p.m.  Thursday-[place]     8:00 a.m.-4:00 p.m.  Friday-[place]                       7:30 a.m.-1:00 p.m. [facility]  [id number] [place] [address].  [place] [address] [phone] Phone  [phone] Fax    Imaging Scheduling all locations  [phone]      [facility]  [address]  [place], [place] [address] [phone]    [facility]  [address] [place] [address] [phone] ask for Women's Clinic     **Surgeries** Our Surgery Schedulers will contact you to schedule. If you do not receive a call within 3 business days, please call [phone].    Urgent Care locations:  [facility] Monday-Friday                 10 am - 8 pm  Saturday and Sunday        9 am - 5 pm   [phone]   If you need a medication refill, please contact your pharmacy. Please allow 3 business days for your refill to be completed.  As always, Thank you for trusting us with your healthcare needs!  If you have any questions regarding your visit, Please contact your care team.    [employer] Services: [phone]    To Schedule an Appointment 24/7  Call: [phone]    see additional instructions from your care team below

## 2025-03-12 ENCOUNTER — OFFICE VISIT (OUTPATIENT)
Dept: OBGYN | Facility: CLINIC | Age: 45
End: 2025-03-12
Attending: STUDENT IN AN ORGANIZED HEALTH CARE EDUCATION/TRAINING PROGRAM
Payer: COMMERCIAL

## 2025-03-12 VITALS
DIASTOLIC BLOOD PRESSURE: 71 MMHG | SYSTOLIC BLOOD PRESSURE: 101 MMHG | BODY MASS INDEX: 26.79 KG/M2 | WEIGHT: 166 LBS | HEART RATE: 70 BPM | OXYGEN SATURATION: 100 %

## 2025-03-12 DIAGNOSIS — Z30.2 ENCOUNTER FOR STERILIZATION: ICD-10-CM

## 2025-03-12 DIAGNOSIS — Z80.49 FAMILY HISTORY OF MALIGNANT NEOPLASM OF ENDOMETRIUM: Primary | ICD-10-CM

## 2025-03-12 DIAGNOSIS — Z30.09 CONTRACEPTIVE EDUCATION: ICD-10-CM

## 2025-03-12 PROCEDURE — 99203 OFFICE O/P NEW LOW 30 MIN: CPT | Performed by: OBSTETRICS & GYNECOLOGY

## 2025-03-12 NOTE — PROGRESS NOTES
Bernie is a 44 year old female  who presents today for consultation regarding contraceptive options.  She has had the Mirena IUD for contraception for about 16 years.  She has had the current product for about 1.5 years.  She gets acne at about 5 years after placement and she replaces it for that reason.    Together, we reviewed the contraceptive options available.  We reviewed the success rates and the pregnancy rates of the options.  These include but are not limited to the male and female sterilization procedures, barrier methods and spermicides. Hormonal methods were reviewed: Nexplanon, Mirena IUD (as well as the ParaGard IUD, although not hormonal), Injections, Rings, Oral contraceptives. Natural family planning also discussed.  We reviewed the R/B/A for abstinence, OCP, Patch, Nuva Ring, Nexplanon, Depo-Provera, IUD, condoms, and permanent sterilization.  I reviewed with her, the potential side effects of hormonal contraception including but not limited to thromboembolic events, hypertension, breakthrough bleeding, GI upset, and headaches.  Proper usage was also reviewed. We also reviewed need for back-up contraception for the first month of hormonal methods.   We also reviewed non-hormonal contraception and the goals and limitations.  Barrier methods were reviewed, and the need for the use of spermicides with the barrier methods.   We also reviewed the PHEXXI use and it is vaginal and may be placed immediately before intercourse and is good for 1 hour.  This is non hormonal and is composed of lactic acid, citric acid and potassium bitartrate.  Reviewed that only abstinence and condoms provide protection from STD's.   The ACOG pamphlets on Laparoscopy and sterilization procedures were given to the patient and reviewed with her. We reviewed the various tubal ligation procedures and the goals and limitations of each, and that the procedures are to be considered permanent and not reversible.   Pregnancy rates  and ectopic pregnancy rates were discussed. The pregnancy rate of approximately 1:400 was reviewed with her and the rate might increase with time. However, this pregnancy risk is significantly lower when salpingectomy is performed for the sterilization.  In addition, the ectopic pregnancy rates vary depending upon the procedure performed, which varies between 25 - 50% of those individuals who do get pregnant.     Post procedure regret was also reviewed.  We discussed impaired fertility after the use of contraception, but this may occur for other reasons than the use of the contraceptive.    The ACOG pamphlets were given to the patient and reviewed with her.  Patient desires the tubal ligation/salpingectomy for birth control.   She will continue to use the Mirena IUD for menstrual regulation.     Questions seemed to be answered.      Past Medical History:   Diagnosis Date    ABNORMAL PAP SMEAR OF CERVIX NEC AND HPV 2006 required cryotherapy    ASCUS of cervix with negative high risk HPV 2017    Diabetes (H)     gestational.    Hypertension     PONV (postoperative nausea and vomiting)      Past Surgical History:   Procedure Laterality Date    APPENDECTOMY  10/29/15    ENT SURGERY      surgery for displaced nose    GYN SURGERY          LAPAROSCOPIC GASTRIC ADJUSTABLE BANDING  2012    Procedure: LAPAROSCOPIC GASTRIC ADJUSTABLE BANDING;  LAPAROSCOPIC GASTRIC ADJUSTABLE BANDING ;  Surgeon: Sina Day MD;  Location: [facility] OR    MAMMOPLASTY REDUCTION BILATERAL       OB History    Para Term  AB Living   1 1 0 0 0 1   SAB IAB Ectopic Multiple Live Births   0 0 0 0 1      # Outcome Date GA Lbr Sid/2nd Weight Sex Type Anes PTL Lv   1 Para      CS-LTranv           Allergies   Allergen Reactions    Bactrim [Sulfamethoxazole-Trimethoprim]     Seasonal Allergies     Sulfa Antibiotics      Current Outpatient Medications   Medication Sig Dispense Refill    levonorgestrel (MIRENA) 52  MG (20 mcg/day) IUD 1 each by Intrauterine route once      lisinopril (ZESTRIL) 10 MG tablet Take 1 tablet (10 mg) by mouth daily. 90 tablet 3    TIRZEPATIDE-WEIGHT MANAGEMENT SC Inject subcutaneously.      topiramate (TOPAMAX) 50 MG tablet Take 1 tablet (50 mg) by mouth daily. 90 tablet 3    tretinoin (RETIN-A) 0.025 % external cream : Apply topically at bedtime - Topical 45 g 1    linaclotide (LINZESS) 72 MCG capsule Take 1 capsule (72 mcg) by mouth every morning (before breakfast). (Patient not taking: Reported on 3/12/2025) 30 capsule 2     Social History     Socioeconomic History    Marital status:      Spouse name: Not on file    Number of children: 1    Years of education: Not on file    Highest education level: Not on file   Occupational History    Not on file   Tobacco Use    Smoking status: Never     Passive exposure: Never    Smokeless tobacco: Never    Tobacco comments:     no second hand smoke   Vaping Use    Vaping status: Never Used   Substance and Sexual Activity    Alcohol use: Yes     Comment: rare about 0-1 drink per month    Drug use: No    Sexual activity: Yes     Partners: Male     Birth control/protection: I.U.D.   Other Topics Concern    Parent/sibling w/ CABG, MI or angioplasty before 65F 55M? No   Social History Narrative    Not on file     Social Drivers of Health     Financial Resource Strain: Low Risk  (2/3/2025)    Financial Resource Strain     Within the past 12 months, have you or your family members you live with been unable to get utilities (heat, electricity) when it was really needed?: No   Food Insecurity: Low Risk  (2/3/2025)    Food Insecurity     Within the past 12 months, did you worry that your food would run out before you got money to buy more?: No     Within the past 12 months, did the food you bought just not last and you didn t have money to get more?: No   Transportation Needs: Low Risk  (2/3/2025)    Transportation Needs     Within the past 12 months, has lack  of transportation kept you from medical appointments, getting your medicines, non-medical meetings or appointments, work, or from getting things that you need?: No   Physical Activity: Insufficiently Active (2/3/2025)    Exercise Vital Sign     Days of Exercise per Week: 2 days     Minutes of Exercise per Session: 20 min   Stress: Stress Concern Present (2/3/2025)    Citizen of [place] of Occupational Health - Occupational Stress Questionnaire     Feeling of Stress : To some extent   Social Connections: Unknown (2/3/2025)    Social Connection and Isolation Panel [NHANES]     Frequency of Communication with Friends and Family: Not on file     Frequency of Social Gatherings with Friends and Family: Twice a week     Attends Anabaptist Services: Not on file     Active Member of Clubs or Organizations: Not on file     Attends Club or Organization Meetings: Not on file     Marital Status: Not on file   Interpersonal Safety: Low Risk  (2/3/2025)    Interpersonal Safety     Do you feel physically and emotionally safe where you currently live?: Yes     Within the past 12 months, have you been hit, slapped, kicked or otherwise physically hurt by someone?: No     Within the past 12 months, have you been humiliated or emotionally abused in other ways by your partner or ex-partner?: No   Housing Stability: Low Risk  (2/3/2025)    Housing Stability     Do you have housing? : Yes     Are you worried about losing your housing?: No     Family History   Problem Relation Age of Onset    Cancer Mother 60        uterus    Hypertension Mother     Depression Mother     Thyroid Disease Father     Thyroid Disease Sister     Thyroid Disease Sister     Diabetes Maternal Grandmother     Hypertension Maternal Grandmother     Breast Cancer Maternal Grandmother         late 60's/70's    Diabetes Maternal Grandfather     Hypertension Maternal Grandfather     Thyroid Disease Paternal Grandmother     C.A.D. No family hx of     Cancer - colorectal No  family hx of     Cerebrovascular Disease No family hx of     Asthma No family hx of        Review of Systems:  10 point ROS of systems including Constitutional, Eyes, Respiratory, Cardiovascular, Gastroenterology, Genitourinary, Integumentary, Muscularskeletal, Psychiatric were all negative except for pertinent positives noted in my HPI and in the PMH.      EXAM:  /71 (BP Location: Right arm, Cuff Size: Adult Regular)   Pulse 70   Wt 75.3 kg (166 lb)   SpO2 100%   BMI 26.79 kg/m    Body mass index is 26.79 kg/m .  General:  WNWD female, NAD  Alert  Oriented x 3  Gait:  Normal  Skin:  Normal skin turgor  HEENT:  NC/AT, EOMI  Abdomen:  Non-tender, non-distended.  Pelvic exam:  Not performed  Extremities:  No clubbing, no cyanosis and no edema.      ASSESSMENT:  Contraceptive management/Family Planning.  Encounter for sterilization consultation   Family history of uterine endometrial cancer      PLAN:  She desires to proceed with the bilateral salpingectomy for contraception and ovarian cancer risk reduction.  She will continue with the IUD for the endometrial cancer risk reduction and menstrual regulation.    The ACOG pamphlets are given to her and reviewed with her regarding contraception and sterilization procedures.  She would like to have the laparoscopic approach.  She has had a  section and this may have caused adhesions that may necessitate an option procedure.  She has also had an abdominoplasty which may complicate the surgical approach.  The risks include, but are not limited to, death, brain damage, paralysis of any or all limbs, loss of an organ, function of an organ, disfiguring scars, bleeding, infection, damage to the uterus, tubes, ovaries, bowel, bladder/urinary system, cervix and vagina.  In addition, there is a possibility of other procedures that might be deemed necessary at the time of the surgery, depending upon findings and/or complications.  This may also include laparotomy,  and rarely colostomy (which often times can be reversible in the future).  If blood transfusion is needed, the risks with include but are not limited to HIV/AIDS, hepatitis and transfusion reactions, with transfusion reactions being the greatest risk.  The surgical request has been placed.  Tubal papers have been signed.  She has been given a copy to bring with her for the procedure.   Questions seem to be answered.     Total time preparing to see patient with reviewing prior encounter and labs, face to face time, coordinating care and documentation, on the same calendar date:  30 minutes     bOed Munoz MD

## 2025-03-14 ENCOUNTER — PREP FOR PROCEDURE (OUTPATIENT)
Dept: OBGYN | Facility: CLINIC | Age: 45
End: 2025-03-14
Payer: COMMERCIAL

## 2025-03-14 DIAGNOSIS — Z30.2 ENCOUNTER FOR STERILIZATION: Primary | ICD-10-CM

## 2025-03-17 ENCOUNTER — PREP FOR PROCEDURE (OUTPATIENT)
Dept: OBGYN | Facility: CLINIC | Age: 45
End: 2025-03-17
Payer: COMMERCIAL

## 2025-03-17 ENCOUNTER — TELEPHONE (OUTPATIENT)
Dept: OBGYN | Facility: CLINIC | Age: 45
End: 2025-03-17
Payer: COMMERCIAL

## 2025-03-17 DIAGNOSIS — Z30.2 ENCOUNTER FOR STERILIZATION: Primary | ICD-10-CM

## 2025-03-17 NOTE — TELEPHONE ENCOUNTER
Associated Diagnoses    Encounter for sterilization [Z30.2]  - Primary        Source Order Set    Order Set Name Order ID    [id number]     Case Request: Case Info    Panel 1    Providers    Provider Role Service   Obed Munoz MD Primary Gynecology     Procedures    Procedure Laterality Anesthesia Region   SALPINGECTOMY, LAPAROSCOPIC [16017 (CPT )] Bilateral General Abdomen                  Requested date:   Location:  OR   Patient class: Outpatient      Pre-op diagnoses: Encounter for sterilization     Scheduling Instructions    Additional Instructions for the Case Additional OR technician needed for assistance?:   No  Multi Surgeon Case No  H&P:  Pre-op options: PCP  Post-op:  2 weeks  Sterilization consent:  Yes and was signed on 3/12/2025.  Vendor: No  Surgical time needed: 60 Minutes  ERAS patient: Yes  If scheduling for D&E does Laminaria placement: No      Ligassure     SURGERY SCHEDULING AND PRECERTIFICATION    Medical Record Number: [id number]  Bernie Lara  YOB: 1980   Phone: [phone] (home)   Primary Provider: Michelle Ortega    Reason for Admit:  ICD-10 CODE:  Z30.2    Surgeon: Obed Munoz MD  Surgical Procedure: SALPINGECTOMY, LAPAROSCOPIC [84169 (CPT )]    Date of Surgery 05/23/2025 Time of Surgery 12:50  Surgery to be performed at:  [facility] [place]   Status: Outpatient  Type of Anesthesia Anticipated: General    Sterilization consent:  Yes and was signed on 3/12/2025.    Pre-Op: On 05/12/2025 with Dr. Michelle Ortega at [facility]  COVID testing:  Per Provider's discretion Covid testing is not indicated.     Post-Op:   2 weeks on 06/06/2025 with Dr. Munoz at [facility]  Pre-certification routed to Financial Counselors:  Auto routes via Case Request    Surgery packet: Sent via [employer]  Patient instructed NPO 12 hours prior to surgery, arrive according to the time the nurse gives  patient when called prior to surgery, must have a .  Patient understood and agrees to the plan.      Requestor:  Rosangela Lora     Location:  [place] [id number]

## 2025-03-19 PROBLEM — Z30.2 ENCOUNTER FOR STERILIZATION: Status: ACTIVE | Noted: 2025-03-17

## 2025-05-12 ENCOUNTER — OFFICE VISIT (OUTPATIENT)
Dept: FAMILY MEDICINE | Facility: CLINIC | Age: 45
End: 2025-05-12
Payer: COMMERCIAL

## 2025-05-12 VITALS
HEART RATE: 90 BPM | TEMPERATURE: 98.6 F | SYSTOLIC BLOOD PRESSURE: 125 MMHG | WEIGHT: 167.2 LBS | BODY MASS INDEX: 27.86 KG/M2 | OXYGEN SATURATION: 100 % | RESPIRATION RATE: 14 BRPM | DIASTOLIC BLOOD PRESSURE: 84 MMHG | HEIGHT: 65 IN

## 2025-05-12 DIAGNOSIS — Z01.818 PREOP GENERAL PHYSICAL EXAM: Primary | ICD-10-CM

## 2025-05-12 DIAGNOSIS — N18.2 CKD (CHRONIC KIDNEY DISEASE) STAGE 2, GFR 60-89 ML/MIN: ICD-10-CM

## 2025-05-12 DIAGNOSIS — I10 BENIGN ESSENTIAL HYPERTENSION: ICD-10-CM

## 2025-05-12 DIAGNOSIS — Z30.2 ENCOUNTER FOR STERILIZATION: ICD-10-CM

## 2025-05-12 LAB
BASOPHILS # BLD AUTO: 0.1 10E3/UL (ref 0–0.2)
BASOPHILS NFR BLD AUTO: 1 %
EOSINOPHIL # BLD AUTO: 0.1 10E3/UL (ref 0–0.7)
EOSINOPHIL NFR BLD AUTO: 2 %
ERYTHROCYTE [DISTWIDTH] IN BLOOD BY AUTOMATED COUNT: 12.5 % (ref 10–15)
HCT VFR BLD AUTO: 38.4 % (ref 35–47)
HGB BLD-MCNC: 12.8 G/DL (ref 11.7–15.7)
IMM GRANULOCYTES # BLD: 0 10E3/UL
IMM GRANULOCYTES NFR BLD: 0 %
LYMPHOCYTES # BLD AUTO: 2.1 10E3/UL (ref 0.8–5.3)
LYMPHOCYTES NFR BLD AUTO: 30 %
MCH RBC QN AUTO: 30.6 PG (ref 26.5–33)
MCHC RBC AUTO-ENTMCNC: 33.3 G/DL (ref 31.5–36.5)
MCV RBC AUTO: 92 FL (ref 78–100)
MONOCYTES # BLD AUTO: 0.5 10E3/UL (ref 0–1.3)
MONOCYTES NFR BLD AUTO: 7 %
NEUTROPHILS # BLD AUTO: 4.3 10E3/UL (ref 1.6–8.3)
NEUTROPHILS NFR BLD AUTO: 60 %
PLATELET # BLD AUTO: 269 10E3/UL (ref 150–450)
RBC # BLD AUTO: 4.18 10E6/UL (ref 3.8–5.2)
WBC # BLD AUTO: 7.1 10E3/UL (ref 4–11)

## 2025-05-12 PROCEDURE — 99214 OFFICE O/P EST MOD 30 MIN: CPT | Performed by: STUDENT IN AN ORGANIZED HEALTH CARE EDUCATION/TRAINING PROGRAM

## 2025-05-12 PROCEDURE — 36415 COLL VENOUS BLD VENIPUNCTURE: CPT | Performed by: STUDENT IN AN ORGANIZED HEALTH CARE EDUCATION/TRAINING PROGRAM

## 2025-05-12 PROCEDURE — 85025 COMPLETE CBC W/AUTO DIFF WBC: CPT | Performed by: STUDENT IN AN ORGANIZED HEALTH CARE EDUCATION/TRAINING PROGRAM

## 2025-05-12 PROCEDURE — 80048 BASIC METABOLIC PNL TOTAL CA: CPT | Performed by: STUDENT IN AN ORGANIZED HEALTH CARE EDUCATION/TRAINING PROGRAM

## 2025-05-12 ASSESSMENT — PAIN SCALES - GENERAL: PAINLEVEL_OUTOF10: NO PAIN (0)

## 2025-05-12 NOTE — PROGRESS NOTES
Preoperative Evaluation  [address] [address]  Phone: [phone]  Fax: [phone]  Primary Provider: Michelle Ortega DO  Pre-op Performing Provider: Michelle Ortega DO  May 12, 2025             5/12/2025   Surgical Information   What procedure is being done? tubes ties, SALPINGECTOMY, LAPAROSCOPIC    Facility or Hospital where procedure/surgery will be performed: [facility] OR   Who is doing the procedure / surgery? Dr. Obed Munoz   Date of surgery / procedure: May 30, 2025   Time of surgery / procedure: 12:00 PM   Where do you plan to recover after surgery? at home with family     Fax number for surgical facility: Note does not need to be faxed, will be available electronically in Epic.    Assessment & Plan     The proposed surgical procedure is considered INTERMEDIATE risk.    Preop general physical exam  Approval given to proceed with surgery. Labs today.   - CBC with platelets and differential; Future  - Basic metabolic panel  (Ca, Cl, CO2, Creat, Gluc, K, Na, BUN); Future  - CBC with platelets and differential  - Basic metabolic panel  (Ca, Cl, CO2, Creat, Gluc, K, Na, BUN)    Encounter for sterilization  Scheduled for tubal     Benign essential hypertension  Chronic, BP well controlled on lisinopril 10 mg. Hold on day of surgery.     CKD (chronic kidney disease) stage 2, GFR 60-89 ml/min  Recheck renal  function today. Trending between CKD2 and 3a. On ACEI.             - No identified additional risk factors other than previously addressed    Antiplatelet or Anticoagulation Medication Instructions   - We reviewed the medication list and the patient is not on an antiplatelet or anticoagulation medications.    Additional Medication Instructions   - ACE/ARB/ARNI (lisinopril, enalapril, losartan, valsartan, olmesartan, sacubritril/valsartan) : DO NOT TAKE on day of surgery (minimum 11 hours for general anesthesia).   - GLP-1 Injectable  (exenitide, liraglutide, semaglutide, dulaglutide, etc.): DO NOT TAKE 7 days before surgery   - Hold Topamax on the day of surgery     Recommendation  Approval given to proceed with proposed procedure, without further diagnostic evaluation.    Follow-up       Subjective   Bernie is a 44 year old, presenting for the following:  Pre-Op Exam          5/12/2025    12:34 PM   Additional Questions   Roomed by Woo MOREL MA   Accompanied by Self     HPI: tubal ligation       On compounded zepbound. Started on injectable 4 weeks ago. Before was on sublingual. Still on topamax.     Was doing some strength training.         5/12/2025   Pre-Op Questionnaire   Have you ever had a heart attack or stroke? No   Have you ever had surgery on your heart or blood vessels, such as a stent placement, a coronary artery bypass, or surgery on an artery in your head, neck, heart, or legs? No   Do you have chest pain with activity? No   Do you have a history of heart failure? No   Do you currently have a cold, bronchitis or symptoms of other infection? No   Do you have a cough, shortness of breath, or wheezing? No   Do you or anyone in your family have previous history of blood clots? No   Do you or does anyone in your family have a serious bleeding problem such as prolonged bleeding following surgeries or cuts? No   Have you ever had problems with anemia or been told to take iron pills? No   Have you had any abnormal blood loss such as black, tarry or bloody stools, or abnormal vaginal bleeding? No   Have you ever had a blood transfusion? No   Are you willing to have a blood transfusion if it is medically needed before, during, or after your surgery? Yes   Have you or any of your relatives ever had problems with anesthesia? No   Do you have sleep apnea, excessive snoring or daytime drowsiness? No   Do you have any artifical heart valves or other implanted medical devices like a pacemaker, defibrillator, or continuous glucose monitor? No   Do  you have artificial joints? No   Are you allergic to latex? (!) YES- rash      Advance Care Planning    Discussed advance care planning with patient; however, patient declined at this time.    Preoperative Review of    reviewed - no record of controlled substances prescribed.      Status of Chronic Conditions:  See problem list for active medical problems.  Problems all longstanding and stable, except as noted/documented.  See ROS for pertinent symptoms related to these conditions.    Patient Active Problem List    Diagnosis Date Noted    Encounter for sterilization 03/17/2025     Priority: Medium    Elevated serum creatinine 07/17/2017     Priority: Medium    Benign essential hypertension 05/12/2017     Priority: Medium    ASCUS of cervix with negative high risk HPV 05/05/2017     Priority: Medium     2002 abnormal pap, treated with cryotherapy   2006 NIL pap  2010 NIL pap  2014 NIL pap  5/5/17 ASCUS pap/neg HR HPV. Plan: cotest in 3 years.  11/2/23 NIL pap, + HR HPV (not 16 or 18). Plan: cotest in 1 yr.   2/3/25 NIL Pap, Neg HR HPV. Plan: cotest in 1 yr.           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 2015, was recommended to have 6-month f/u but patient did not follow up. Needs repeat mammo asap      IUD (intrauterine device) in place 11/20/2014     Priority: Medium     Replaced 1/14    IUD Lot # [id number]  NDC # 59646-918-91 USE ONLY FOR MIRENA      Melanocytic nevus 10/11/2013     Priority: Medium     left buttock. 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    Hypertension     PONV (postoperative nausea and vomiting)      Past Surgical History:   Procedure Laterality Date    APPENDECTOMY  10/29/15    ENT SURGERY      surgery for displaced nose    GYN SURGERY          LAPAROSCOPIC GASTRIC ADJUSTABLE BANDING  2012    Procedure: LAPAROSCOPIC GASTRIC ADJUSTABLE BANDING;  LAPAROSCOPIC GASTRIC ADJUSTABLE BANDING ;  Surgeon: Sina Day MD;  Location: [facility] OR    MAMMOPLASTY REDUCTION BILATERAL       Current Outpatient Medications   Medication Sig Dispense Refill    levonorgestrel (MIRENA) 52 MG (20 mcg/day) IUD 1 each by Intrauterine route once      lisinopril (ZESTRIL) 10 MG tablet Take 1 tablet (10 mg) by mouth daily. 90 tablet 3    TIRZEPATIDE-WEIGHT MANAGEMENT SC Inject subcutaneously.      topiramate (TOPAMAX) 50 MG tablet Take 1 tablet (50 mg) by mouth daily. 90 tablet 3       Allergies   Allergen Reactions    Bactrim [Sulfamethoxazole-Trimethoprim]     Seasonal Allergies     Sulfa Antibiotics     Latex Rash        Social History     Tobacco Use    Smoking status: Never     Passive exposure: Never    Smokeless tobacco: Never    Tobacco comments:     no second hand smoke   Substance Use Topics    Alcohol use: Yes     Comment: rare about 0-1 drink per month     Family History   Problem Relation Age of Onset    Cancer Mother 60        uterus    Hypertension Mother     Depression Mother     Thyroid Disease Father     Thyroid Disease Sister     Thyroid Disease Sister     Diabetes Maternal Grandmother     Hypertension Maternal Grandmother     Breast Cancer Maternal Grandmother         late 60's/70's    Diabetes Maternal Grandfather     Hypertension Maternal Grandfather     Thyroid Disease Paternal Grandmother     C.A.D. No family hx of     Cancer - colorectal No family  "hx of     Cerebrovascular Disease No family hx of     Asthma No family hx of      History   Drug Use No             Review of Systems  Constitutional, HEENT, cardiovascular, pulmonary, gi and gu systems are negative, except as otherwise noted.    Objective    /84 (BP Location: Left arm, Patient Position: Chair, Cuff Size: Adult Regular)   Pulse 90   Temp 98.6  F (37  C) (Oral)   Resp 14   Ht 1.658 m (5' 5.28\")   Wt 75.8 kg (167 lb 3.2 oz)   LMP  (LMP Unknown)   SpO2 100%   Breastfeeding No   BMI 27.59 kg/m     Estimated body mass index is 27.59 kg/m  as calculated from the following:    Height as of this encounter: 1.658 m (5' 5.28\").    Weight as of this encounter: 75.8 kg (167 lb 3.2 oz).  Physical Exam  GENERAL: alert and no distress  EYES: Eyes grossly normal to inspection, PERRL and conjunctivae and sclerae normal  HENT: ear canals and TM's normal, nose and mouth without ulcers or lesions  NECK: no adenopathy, no asymmetry, masses, or scars  RESP: lungs clear to auscultation - no rales, rhonchi or wheezes  CV: regular rate and rhythm, normal S1 S2, no S3 or S4, no murmur, click or rub, no peripheral edema  ABDOMEN: soft, nontender, no hepatosplenomegaly, no masses and bowel sounds normal  MS: no gross musculoskeletal defects noted, no edema  SKIN: no suspicious lesions or rashes  NEURO: Normal strength and tone, mentation intact and speech normal  PSYCH: mentation appears normal, affect normal/bright    Recent Labs   Lab Test 02/03/25  0936      POTASSIUM 4.2   CR 1.27*        Diagnostics  Labs pending at this time.  Results will be reviewed when available.  Recent Results (from the past 24 hours)   CBC with platelets and differential    Collection Time: 05/12/25  1:00 PM   Result Value Ref Range    WBC Count 7.1 4.0 - 11.0 10e3/uL    RBC Count 4.18 3.80 - 5.20 10e6/uL    Hemoglobin 12.8 11.7 - 15.7 g/dL    Hematocrit 38.4 35.0 - 47.0 %    MCV 92 78 - 100 fL    MCH 30.6 26.5 - 33.0 pg    " MCHC 33.3 31.5 - 36.5 g/dL    RDW 12.5 10.0 - 15.0 %    Platelet Count 269 150 - 450 10e3/uL    % Neutrophils 60 %    % Lymphocytes 30 %    % Monocytes 7 %    % Eosinophils 2 %    % Basophils 1 %    % Immature Granulocytes 0 %    Absolute Neutrophils 4.3 1.6 - 8.3 10e3/uL    Absolute Lymphocytes 2.1 0.8 - 5.3 10e3/uL    Absolute Monocytes 0.5 0.0 - 1.3 10e3/uL    Absolute Eosinophils 0.1 0.0 - 0.7 10e3/uL    Absolute Basophils 0.1 0.0 - 0.2 10e3/uL    Absolute Immature Granulocytes 0.0 <=0.4 10e3/uL      No EKG required, no history of coronary heart disease, significant arrhythmia, peripheral arterial disease or other structural heart disease.    Revised Cardiac Risk Index (RCRI)  The patient has the following serious cardiovascular risks for perioperative complications:   - No serious cardiac risks = 0 points     RCRI Interpretation: 0 points: Class I (very low risk - 0.4% complication rate)         Signed Electronically by: Michelle Ortega DO  A copy of this evaluation report is provided to the requesting physician.

## 2025-05-13 LAB
ANION GAP SERPL CALCULATED.3IONS-SCNC: 10 MMOL/L (ref 7–15)
BUN SERPL-MCNC: 16.6 MG/DL (ref 6–20)
CALCIUM SERPL-MCNC: 9 MG/DL (ref 8.8–10.4)
CHLORIDE SERPL-SCNC: 105 MMOL/L (ref 98–107)
CREAT SERPL-MCNC: 1.17 MG/DL (ref 0.51–0.95)
EGFRCR SERPLBLD CKD-EPI 2021: 59 ML/MIN/1.73M2
GLUCOSE SERPL-MCNC: 97 MG/DL (ref 70–99)
HCO3 SERPL-SCNC: 23 MMOL/L (ref 22–29)
POTASSIUM SERPL-SCNC: 4.2 MMOL/L (ref 3.4–5.3)
SODIUM SERPL-SCNC: 138 MMOL/L (ref 135–145)

## 2025-05-14 ENCOUNTER — RESULTS FOLLOW-UP (OUTPATIENT)
Dept: FAMILY MEDICINE | Facility: CLINIC | Age: 45
End: 2025-05-14

## 2025-05-30 ENCOUNTER — HOSPITAL ENCOUNTER (OUTPATIENT)
Facility: AMBULATORY SURGERY CENTER | Age: 45
Discharge: HOME | End: 2025-05-30
Attending: OBSTETRICS & GYNECOLOGY | Admitting: OBSTETRICS & GYNECOLOGY

## 2025-05-30 VITALS
OXYGEN SATURATION: 100 % | RESPIRATION RATE: 16 BRPM | HEART RATE: 66 BPM | WEIGHT: 163 LBS | TEMPERATURE: 97.2 F | SYSTOLIC BLOOD PRESSURE: 112 MMHG | DIASTOLIC BLOOD PRESSURE: 70 MMHG | BODY MASS INDEX: 26.9 KG/M2

## 2025-05-30 DIAGNOSIS — Z30.2 ENCOUNTER FOR STERILIZATION: ICD-10-CM

## 2025-05-30 DIAGNOSIS — G89.18 POST-OP PAIN: Primary | ICD-10-CM

## 2025-05-30 LAB — HCG UR QL: NEGATIVE

## 2025-05-30 PROCEDURE — 58661 LAPAROSCOPY REMOVE ADNEXA: CPT | Mod: 50 | Performed by: OBSTETRICS & GYNECOLOGY

## 2025-05-30 PROCEDURE — 58662 LAPAROSCOPY EXCISE LESIONS: CPT | Performed by: OBSTETRICS & GYNECOLOGY

## 2025-05-30 PROCEDURE — 81025 URINE PREGNANCY TEST: CPT | Performed by: ANESTHESIOLOGY

## 2025-05-30 RX ORDER — SODIUM CHLORIDE, SODIUM LACTATE, POTASSIUM CHLORIDE, CALCIUM CHLORIDE 600; 310; 30; 20 MG/100ML; MG/100ML; MG/100ML; MG/100ML
INJECTION, SOLUTION INTRAVENOUS CONTINUOUS
Status: DISCONTINUED | OUTPATIENT
Start: 2025-05-30 | End: 2025-05-31 | Stop reason: HOSPADM

## 2025-05-30 RX ORDER — NALOXONE HYDROCHLORIDE 0.4 MG/ML
0.1 INJECTION, SOLUTION INTRAMUSCULAR; INTRAVENOUS; SUBCUTANEOUS
Status: DISCONTINUED | OUTPATIENT
Start: 2025-05-30 | End: 2025-05-31 | Stop reason: HOSPADM

## 2025-05-30 RX ORDER — IBUPROFEN 400 MG/1
800 TABLET, FILM COATED ORAL ONCE
Status: CANCELLED | OUTPATIENT
Start: 2025-05-30 | End: 2025-05-30

## 2025-05-30 RX ORDER — ACETAMINOPHEN 325 MG/1
650 TABLET ORAL EVERY 6 HOURS PRN
Qty: 50 TABLET | Refills: 0 | Status: SHIPPED | OUTPATIENT
Start: 2025-05-30

## 2025-05-30 RX ORDER — ONDANSETRON 4 MG/1
4 TABLET, ORALLY DISINTEGRATING ORAL EVERY 30 MIN PRN
Status: DISCONTINUED | OUTPATIENT
Start: 2025-05-30 | End: 2025-05-31 | Stop reason: HOSPADM

## 2025-05-30 RX ORDER — IBUPROFEN 800 MG/1
800 TABLET, FILM COATED ORAL EVERY 6 HOURS PRN
Qty: 30 TABLET | Refills: 0 | Status: SHIPPED | OUTPATIENT
Start: 2025-05-30

## 2025-05-30 RX ORDER — ACETAMINOPHEN 325 MG/1
975 TABLET ORAL ONCE
Status: COMPLETED | OUTPATIENT
Start: 2025-05-30 | End: 2025-05-30

## 2025-05-30 RX ORDER — LIDOCAINE 40 MG/G
CREAM TOPICAL
Status: DISCONTINUED | OUTPATIENT
Start: 2025-05-30 | End: 2025-05-31 | Stop reason: HOSPADM

## 2025-05-30 RX ORDER — OXYCODONE AND ACETAMINOPHEN 5; 325 MG/1; MG/1
.5-1 TABLET ORAL EVERY 6 HOURS PRN
Qty: 5 TABLET | Refills: 0 | Status: SHIPPED | OUTPATIENT
Start: 2025-05-30

## 2025-05-30 RX ORDER — FENTANYL CITRATE 50 UG/ML
50 INJECTION, SOLUTION INTRAMUSCULAR; INTRAVENOUS EVERY 5 MIN PRN
Status: DISCONTINUED | OUTPATIENT
Start: 2025-05-30 | End: 2025-05-31 | Stop reason: HOSPADM

## 2025-05-30 RX ORDER — DEXAMETHASONE SODIUM PHOSPHATE 4 MG/ML
4 INJECTION, SOLUTION INTRA-ARTICULAR; INTRALESIONAL; INTRAMUSCULAR; INTRAVENOUS; SOFT TISSUE
Status: DISCONTINUED | OUTPATIENT
Start: 2025-05-30 | End: 2025-05-31 | Stop reason: HOSPADM

## 2025-05-30 RX ORDER — OXYCODONE HYDROCHLORIDE 5 MG/1
5 TABLET ORAL
Refills: 0 | Status: CANCELLED | OUTPATIENT
Start: 2025-05-30

## 2025-05-30 RX ORDER — ACETAMINOPHEN 325 MG/1
975 TABLET ORAL ONCE
Status: CANCELLED | OUTPATIENT
Start: 2025-05-30 | End: 2025-05-30

## 2025-05-30 RX ORDER — FENTANYL CITRATE 50 UG/ML
25 INJECTION, SOLUTION INTRAMUSCULAR; INTRAVENOUS EVERY 5 MIN PRN
Status: DISCONTINUED | OUTPATIENT
Start: 2025-05-30 | End: 2025-05-31 | Stop reason: HOSPADM

## 2025-05-30 RX ORDER — ONDANSETRON 2 MG/ML
4 INJECTION INTRAMUSCULAR; INTRAVENOUS EVERY 30 MIN PRN
Status: DISCONTINUED | OUTPATIENT
Start: 2025-05-30 | End: 2025-05-31 | Stop reason: HOSPADM

## 2025-05-30 RX ADMIN — ACETAMINOPHEN 975 MG: 325 TABLET ORAL at 10:12

## 2025-05-30 NOTE — OP NOTE
OPERATIVE REPORT:    Date of Procedure:  5/30/2025    Preoperative Diagnosis:  Encounter for sterilization [Z30.2]    Postoperative Diagnosis:  Encounter for sterilization   Endometriosis     Procedures:  Laparoscopic bilateral salpingectomy  Cautery of endometrial implants     Prosthetic Devices:    None     Surgeon:  Surgeon(s):  Obed Munoz MD  Circulator: Rose Marie Dickerson RN  Scrub Person: Luz Hathaway    Anesthesia:  General     Drains:    None    Specimens:    Left and right fallopian tubes    Complications:   None apparent    Findings/Conclusions:    Powder burn lesions seen on the right uterosacral ligaments  Adhesions of omentum and bowel to the anterior abdominal wall, lower left quadrant.    Estimated Blood Loss:  2 cc    Condition on discharge from OR:  Satisfactory    Procedure in Detail:  Proper consents were obtained.  The patient voiced her understanding of the planned procedure, the risks, benefits, goals and limitations.  She also voiced her understanding that the tubal ligation (tubal removal) is to be considered permanent sterilization and not reversible.  Failure rates as well as the ectopic pregnancy rates were reviewed with her and she voiced her understanding.    After consenting to the procedure, the patient voided in Pre op and then was taken to the OR.  She was then placed into the supine position and placed under general anesthesia.  She was then placed into the dorsal lithotomy position, and prepped and draped in the usual sterile fashion.  The sponge stick was placed into the vagina.   Gloves were then changed.  Attention was then turned to the abdominal approach.  The umbilicus was everted with the Allis clamps and the infraumbilical incision was made.  The Veress needle  was then placed and appropriate placement was verified by use of saline, as well as checking the intra-abdominal pressure upon initial insufflation.  Four liters of CO2 were placed into the  abdomen.  The Veress needle was then removed and the 5 mm trocar and sleeve was placed.  The obturator was removed from the sleeve and the laparoscope was then inserted and confirmed the intra-abdominal location and no apparent entry complications or injuries noted.  The suprapubic incision was made.  The 5 mm trocar and sleeve were placed through the sleeve under direct visualization. The trocar was removed.  Exploration of the pelvis was performed starting with the anterior aspect of the uterus and the exam continued in a clock wise fashion.  Filmy adhesions of the left mesosalpinx to the endometriosis implants noted on the right uterosacral ligament.  No other endometriosis lesions seen.  The hepatic contour was also seen and no lesions were seen.  The incision was made in the RLQ to place the 5 mm trocar and sleeve under direct visualization.  The grasper was placed through the suprapubic port and the LigaSure bipolar cautery was placed through the RLQ port.  The right fallopian tube was seen and grasped with the LigaSure.  The cautery and cutting started at the distal aspect of the tube and the mesosalpinx was grasped, cauterized and cut, in serial fashion.  The fallopian tube was cross clamped at the proximal end and the proximal part of the tube was cauterized and cut.  The tube was removed and brought out through the suprapubic site. The same procedure was performed on the left fallopian tube.  Hemostasis was confirmed.  The LigaSure was then used to initially cauterize the uterosacral ligament endometriosis, but due to the angle of the tissue and the LigaSure, the monopolar ball tip cautery was used to cauterize the endometriosis implants further. I did not see any other implants.  The Monopolar cautery was also used to lightly cauterize the left mesosalpinx margin that had some light oozing.  The instruments were removed under direct visualization, followed by the suprapubic sleeve and the RLQ port.  The  scope was then removed, the abdomen was deflated and the scope was reinserted and the scope and sleeve were removed together under direct visualization.    Skin closure was achieved with Dermabond applied to the incision sites.  The vaginal instruments were then removed.  The patient was taken out of the dorsal lithotomy position and awakened and then taken to the recovery room in stable condition.  No apparent complications, counts were correct.    Obed Munoz MD

## 2025-05-30 NOTE — H&P
I have seen the patient today, and I have reviewed the H&P on record.  At this time, there are no updates indicated.  She desires to continue with the planned procedure, and she voices understanding of the associated risks and benefits and goals and limitations.    Obed Munoz MD

## 2025-05-30 NOTE — DISCHARGE INSTRUCTIONS
Tylenol 975 mg was given at 10:15 PM.  You may take more after 4:15 PM.  You should not take more then 4,000 mg of tylenol/acetaminophen in a 24 hour period.

## 2025-06-16 LAB
PATH REPORT.COMMENTS IMP SPEC: NORMAL
PATH REPORT.COMMENTS IMP SPEC: NORMAL
PATH REPORT.FINAL DX SPEC: NORMAL
PATH REPORT.GROSS SPEC: NORMAL
PATH REPORT.MICROSCOPIC SPEC OTHER STN: NORMAL
PATH REPORT.RELEVANT HX SPEC: NORMAL
PHOTO IMAGE: NORMAL

## 2025-06-17 ENCOUNTER — RESULTS FOLLOW-UP (OUTPATIENT)
Dept: OBGYN | Facility: CLINIC | Age: 45
End: 2025-06-17

## (undated) DEVICE — ESU LIGASURE LAPAROSCOPIC BLUNT TIP SEALER 5MMX37CM LF1637

## (undated) DEVICE — GLOVE BIOGEL PI ULTRATOUCH G SZ 7.5 42175

## (undated) DEVICE — ENDO TROCAR FIRST ENTRY KII FIOS Z-THRD 05X100MM CTF03

## (undated) DEVICE — PREP CHLORAPREP 26ML TINTED ORANGE  260815

## (undated) DEVICE — ANTIFOG SOLUTION W/FOAM PAD CF-1001

## (undated) DEVICE — TUBING INSUFFLATION W/FILTER CPC TO LUER 620-030-301

## (undated) DEVICE — DRAPE LAVH/LAPAROSCOPY W/POUCH 29474

## (undated) DEVICE — PREP SKIN SCRUB TRAY 4461A

## (undated) DEVICE — GLOVE BIOGEL PI MICRO SZ 8.0 48580

## (undated) DEVICE — SU DERMABOND DHV12

## (undated) DEVICE — NDL INSUFFLATION 13GA 120MM C2201

## (undated) DEVICE — PACK MINOR SBA15MIFSE

## (undated) DEVICE — SOL WATER IRRIG 1000ML BOTTLE 07139-09

## (undated) DEVICE — ENDO TROCAR SLEEVE KII Z-THREADED 05X100MM CTS02

## (undated) RX ORDER — DEXAMETHASONE SODIUM PHOSPHATE 4 MG/ML
INJECTION, SOLUTION INTRA-ARTICULAR; INTRALESIONAL; INTRAMUSCULAR; INTRAVENOUS; SOFT TISSUE
Status: DISPENSED
Start: 2025-05-30

## (undated) RX ORDER — FENTANYL CITRATE-0.9 % NACL/PF 10 MCG/ML
PLASTIC BAG, INJECTION (ML) INTRAVENOUS
Status: DISPENSED
Start: 2025-05-30

## (undated) RX ORDER — KETOROLAC TROMETHAMINE 30 MG/ML
INJECTION, SOLUTION INTRAMUSCULAR; INTRAVENOUS
Status: DISPENSED
Start: 2025-05-30

## (undated) RX ORDER — ONDANSETRON 2 MG/ML
INJECTION INTRAMUSCULAR; INTRAVENOUS
Status: DISPENSED
Start: 2025-05-30

## (undated) RX ORDER — ACETAMINOPHEN 325 MG/1
TABLET ORAL
Status: DISPENSED
Start: 2025-05-30